# Patient Record
Sex: MALE | Race: BLACK OR AFRICAN AMERICAN | Employment: OTHER | RURAL
[De-identification: names, ages, dates, MRNs, and addresses within clinical notes are randomized per-mention and may not be internally consistent; named-entity substitution may affect disease eponyms.]

---

## 2017-02-19 DIAGNOSIS — E11.9 CONTROLLED TYPE 2 DIABETES MELLITUS WITHOUT COMPLICATION, WITHOUT LONG-TERM CURRENT USE OF INSULIN (HCC): ICD-10-CM

## 2017-02-20 RX ORDER — SITAGLIPTIN AND METFORMIN HYDROCHLORIDE 500; 50 MG/1; MG/1
TABLET, FILM COATED ORAL
Qty: 60 TAB | Refills: 2 | Status: SHIPPED | OUTPATIENT
Start: 2017-02-20 | End: 2017-04-20 | Stop reason: SDUPTHER

## 2017-03-16 DIAGNOSIS — E11.9 CONTROLLED TYPE 2 DIABETES MELLITUS WITHOUT COMPLICATION, WITHOUT LONG-TERM CURRENT USE OF INSULIN (HCC): ICD-10-CM

## 2017-03-16 RX ORDER — GLIMEPIRIDE 4 MG/1
TABLET ORAL
Qty: 90 TAB | Refills: 1 | Status: SHIPPED | OUTPATIENT
Start: 2017-03-16 | End: 2017-03-24 | Stop reason: SDUPTHER

## 2017-03-24 DIAGNOSIS — E11.9 CONTROLLED TYPE 2 DIABETES MELLITUS WITHOUT COMPLICATION, WITHOUT LONG-TERM CURRENT USE OF INSULIN (HCC): ICD-10-CM

## 2017-03-27 RX ORDER — GLIMEPIRIDE 4 MG/1
TABLET ORAL
Qty: 30 TAB | Refills: 1 | Status: SHIPPED | OUTPATIENT
Start: 2017-03-27 | End: 2017-04-20 | Stop reason: SDUPTHER

## 2017-04-20 ENCOUNTER — OFFICE VISIT (OUTPATIENT)
Dept: FAMILY MEDICINE CLINIC | Age: 67
End: 2017-04-20

## 2017-04-20 VITALS
RESPIRATION RATE: 18 BRPM | WEIGHT: 223 LBS | DIASTOLIC BLOOD PRESSURE: 72 MMHG | SYSTOLIC BLOOD PRESSURE: 124 MMHG | BODY MASS INDEX: 29.55 KG/M2 | HEIGHT: 73 IN | HEART RATE: 93 BPM | TEMPERATURE: 98 F | OXYGEN SATURATION: 97 %

## 2017-04-20 DIAGNOSIS — I10 ESSENTIAL HYPERTENSION: ICD-10-CM

## 2017-04-20 DIAGNOSIS — Z12.11 SCREENING FOR COLON CANCER: ICD-10-CM

## 2017-04-20 DIAGNOSIS — E78.2 MIXED HYPERLIPIDEMIA: ICD-10-CM

## 2017-04-20 DIAGNOSIS — E11.9 TYPE 2 DIABETES MELLITUS WITHOUT COMPLICATION, WITHOUT LONG-TERM CURRENT USE OF INSULIN (HCC): Primary | ICD-10-CM

## 2017-04-20 DIAGNOSIS — E55.9 VITAMIN D DEFICIENCY: ICD-10-CM

## 2017-04-20 DIAGNOSIS — I10 ESSENTIAL HYPERTENSION WITH GOAL BLOOD PRESSURE LESS THAN 140/90: ICD-10-CM

## 2017-04-20 DIAGNOSIS — E11.9 CONTROLLED TYPE 2 DIABETES MELLITUS WITHOUT COMPLICATION, WITHOUT LONG-TERM CURRENT USE OF INSULIN (HCC): ICD-10-CM

## 2017-04-20 DIAGNOSIS — E29.1 HYPOGONADISM IN MALE: ICD-10-CM

## 2017-04-20 LAB — HBA1C MFR BLD HPLC: 6 %

## 2017-04-20 RX ORDER — EZETIMIBE AND SIMVASTATIN 10; 40 MG/1; MG/1
1 TABLET ORAL
Qty: 30 TAB | Refills: 3 | Status: SHIPPED | OUTPATIENT
Start: 2017-04-20 | End: 2017-12-06 | Stop reason: SDUPTHER

## 2017-04-20 RX ORDER — GLIMEPIRIDE 4 MG/1
4 TABLET ORAL
Qty: 30 TAB | Refills: 5 | Status: SHIPPED | OUTPATIENT
Start: 2017-04-20 | End: 2017-12-06 | Stop reason: SDUPTHER

## 2017-04-20 RX ORDER — TADALAFIL 5 MG/1
5 TABLET ORAL DAILY
Qty: 8 TAB | Refills: 5 | Status: SHIPPED | OUTPATIENT
Start: 2017-04-20 | End: 2017-05-20

## 2017-04-20 RX ORDER — TADALAFIL 5 MG/1
5 TABLET ORAL
COMMUNITY
End: 2017-04-20 | Stop reason: SDUPTHER

## 2017-04-20 RX ORDER — LISINOPRIL 20 MG/1
20 TABLET ORAL DAILY
Qty: 30 TAB | Refills: 5 | Status: SHIPPED | OUTPATIENT
Start: 2017-04-20 | End: 2017-12-06 | Stop reason: SDUPTHER

## 2017-04-20 NOTE — PATIENT INSTRUCTIONS
Diabetes Foot Health: Care Instructions  Your Care Instructions    When you have diabetes, your feet need extra care and attention. Diabetes can damage the nerve endings and blood vessels in your feet, making you less likely to notice when your feet are injured. Diabetes also limits your body's ability to fight infection and get blood to areas that need it. If you get a minor foot injury, it could become an ulcer or a serious infection. With good foot care, you can prevent most of these problems. Caring for your feet can be quick and easy. Most of the care can be done when you are bathing or getting ready for bed. Follow-up care is a key part of your treatment and safety. Be sure to make and go to all appointments, and call your doctor if you are having problems. Its also a good idea to know your test results and keep a list of the medicines you take. How can you care for yourself at home? · Keep your blood sugar close to normal by watching what and how much you eat, monitoring blood sugar, taking medicines if prescribed, and getting regular exercise. · Do not smoke. Smoking affects blood flow and can make foot problems worse. If you need help quitting, talk to your doctor about stop-smoking programs and medicines. These can increase your chances of quitting for good. · Eat a diet that is low in fats. High fat intake can cause fat to build up in your blood vessels and decrease blood flow. · Inspect your feet daily for blisters, cuts, cracks, or sores. If you cannot see well, use a mirror or have someone help you. · Take care of your feet:  [facility] your feet every day. Use warm (not hot) water. Check the water temperature with your wrists or other part of your body, not your feet. ¨ Dry your feet well. Pat them dry. Do not rub the skin on your feet too hard. Dry well between your toes. If the skin on your feet stays moist, bacteria or a fungus can grow, which can lead to infection.   ¨ Keep your skin soft. Use moisturizing skin cream to keep the skin on your feet soft and prevent calluses and cracks. But do not put the cream between your toes, and stop using any cream that causes a rash. ¨ Clean underneath your toenails carefully. Do not use a sharp object to clean underneath your toenails. Use the blunt end of a nail file or other rounded tool. ¨ Trim and file your toenails straight across to prevent ingrown toenails. Use a nail clipper, not scissors. Use an emery board to smooth the edges. · Change socks daily. Socks without seams are best, because seams often rub the feet. You can find socks for people with diabetes from specialty catalogs. · Look inside your shoes every day for things like gravel or torn linings, which could cause blisters or sores. · Buy shoes that fit well:  ¨ Look for shoes that have plenty of space around the toes. This helps prevent bunions and blisters. ¨ Try on shoes while wearing the kind of socks you will usually wear with the shoes. ¨ Avoid plastic shoes. They may rub your feet and cause blisters. Good shoes should be made of materials that are flexible and breathable, such as leather or cloth. ¨ Break in new shoes slowly by wearing them for no more than an hour a day for several days. Take extra time to check your feet for red areas, blisters, or other problems after you wear new shoes. · Do not go barefoot. Do not wear sandals, and do not wear shoes with very thin soles. Thin soles are easy to puncture. They also do not protect your feet from hot pavement or cold weather. · Have your doctor check your feet during each visit. If you have a foot problem, see your doctor. Do not try to treat an early foot problem at home. Home remedies or treatments that you can buy without a prescription (such as corn removers) can be harmful. · Always get early treatment for foot problems. A minor irritation can lead to a major problem if not properly cared for early.   When should you call for help? Call your doctor now or seek immediate medical care if:  · You have a foot sore, an ulcer or break in the skin that is not healing after 4 days, bleeding corns or calluses, or an ingrown toenail. · You have blue or black areas, which can mean bruising or blood flow problems. · You have peeling skin or tiny blisters between your toes or cracking or oozing of the skin. · You have a fever for more than 24 hours and a foot sore. · You have new numbness or tingling in your feet that does not go away after you move your feet or change positions. · You have unexplained or unusual swelling of the foot or ankle. Watch closely for changes in your health, and be sure to contact your doctor if:  · You cannot do proper foot care. Where can you learn more? Go to http://darrick-dyllan.info/. Enter A739 in the search box to learn more about \"Diabetes Foot Health: Care Instructions. \"  Current as of: May 23, 2016  Content Version: 11.2  © 6501-8189 [employer]. Care instructions adapted under license by [employer] (which disclaims liability or warranty for this information). If you have questions about a medical condition or this instruction, always ask your healthcare professional. [address] any warranty or liability for your use of this information. Learning About Colonoscopy  What is a colonoscopy? A colonoscopy is a test (also called a procedure) that lets a doctor look inside your large intestine. The doctor uses a thin, lighted tube called a colonoscope. The doctor uses it to look for small growths called polyps, colon or rectal cancer (colorectal cancer), or other problems like bleeding. During the procedure, the doctor can take samples of tissue. The samples can then be checked for cancer or other conditions. The doctor can also take out polyps. How is colonoscopy done?   This procedure is done in a doctor's office or a clinic or hospital. Barbara Ruby will get medicine to help you relax and not feel pain. Some people find that they do not remember having the test because of the medicine. The doctor gently moves the colonoscope, or scope, through the colon. The scope is also a small video camera. It lets the doctor see the colon and take pictures. A colonoscopy usually takes 30 to 45 minutes. It may take longer if the doctor has to remove polyps. How do you prepare for the procedure? You need to clean out your colon before the procedure so the doctor can see all of your colon. You may start the cleaning process a day or two before the test. This depends on which \"colon prep\" your doctor recommends. To clean your colon, you stop eating solid foods and drink only clear liquids. You can have water, tea, coffee, clear juices, clear broths, flavored ice pops, and gelatin (such as Jell-O). Do not drink anything red or purple, such as grape juice or fruit punch. And do not eat red or purple foods, such as grape ice pops or cherry gelatin. The day or night before the procedure, you drink a large amount of a special liquid. This causes loose, frequent stools. You will go to the bathroom a lot. It is very important to drink all of the colon prep liquid. If you have problems drinking the liquid, call your doctor. For many people, the prep is worse than the test. It may be uncomfortable, and you may feel hungry on the clear liquid diet. Some people do not go to work or do their usual activities on the day of the prep. Arrange to have someone take you home after the test.  What can you expect after a colonoscopy? The nurses will watch you for 1 to 2 hours until the medicines wear off. Then you can go home. You will need a ride. Your doctor will tell you when you can eat and do your usual activities. Your doctor will talk to you about when you will need your next colonoscopy.  The results of your test and your risk for colorectal cancer will help your doctor decide how often you need to be checked. Follow-up care is a key part of your treatment and safety. Be sure to make and go to all appointments, and call your doctor if you are having problems. It's also a good idea to know your test results and keep a list of the medicines you take. Where can you learn more? Go to http://darrick-dyllan.info/. Enter F796 in the search box to learn more about \"Learning About Colonoscopy. \"  Current as of: July 26, 2016  Content Version: 11.2  © 8412-2209 [employer]. Care instructions adapted under license by [employer] (which disclaims liability or warranty for this information). If you have questions about a medical condition or this instruction, always ask your healthcare professional. [address] any warranty or liability for your use of this information.

## 2017-04-20 NOTE — PROGRESS NOTES
CC:  Chief Complaint   Patient presents with   [facility] Medication Refill       HISTORY OF PRESENT ILLNESS  Basil Calixto is a 61 y.o. male. HPI Comments: Who presents today for follow up of type II DM, HTN, HLD and also medication refills with labs. Mr. Jenna Espinosa continues to do very well. He is adherent to his medical plan and eating a low fat, low carbohydrate diet. Since his last visit, he has not been hospitalized or seen in the ED. LUIS E is being brought up-to-date today. He is followed by urology annually and has PSA and FLY done with them. He does have a h/o prostate cancer. Labs     Medication Refill         PMH:  Past Medical History:   Diagnosis Date    Colon polyps     Contact dermatitis and other eczema, due to unspecified cause     DM type 2 (diabetes mellitus, type 2) ([facility] [address] 6/10/2009    Essential hypertension, benign     Hypertension     [facility] 9[facility]25[facility]2012    Mixed hyperlipidemia 6/10/2009    Mixed hyperlipidemia 6/10/2009    Prostate cancer ([facility] 11/2009    resection prostate       MEDICATIONS:  Current Outpatient Prescriptions   Medication Sig Dispense Refill    diph,pertuss,acel,,tetanus vac,PF, (ADACEL) 2 Lf-(2.5-5-3-5 mcg)-5Lf/0.5 mL syrg vaccine 0.5 mL by IntraMUSCular route once for 1 dose. 0.5 mL 0    tadalafil (CIALIS) 5 mg tablet Take 1 Tab by mouth daily for 30 days. 8 Tab 5    glimepiride (AMARYL) 4 mg tablet Take 1 Tab by mouth every morning for 30 days. 30 Tab 5    SITagliptin-metFORMIN (JANUMET)  mg per tablet Take 1 Tab by mouth two (2) times daily (with meals). 60 Tab 5    lisinopril (PRINIVIL, ZESTRIL) 20 mg tablet Take 1 Tab by mouth daily. 30 Tab 5    ezetimibe-simvastatin (VYTORIN 10/40) 10-40 mg per tablet Take 1 Tab by mouth nightly. 30 Tab 3    glucose blood VI test strips (ONETOUCH ULTRA TEST) strip Check BS daily 100 Strip 3    aspirin delayed-release 81 mg tablet Take 81 mg by mouth daily.       omega-3 fatty acids cap Take 1,000 mg by mouth daily.  hydrocortisone (HYTONE) 2.5 % topical cream Apply  to affected area two (2) times a day. use thin layer 1 Tube 5         ROS:  Review of Systems   All other systems reviewed and are negative. OBJECTIVE:  Visit Vitals    /72 (BP 1 Location: Right arm, BP Patient Position: Sitting)  Comment: auto cuff    Pulse 93    Temp 98 °F (36.7 °C) (Temporal)    Resp 18    Ht 6' 1\" (1.854 m)    Wt 223 lb (101.2 kg)    SpO2 97%    BMI 29.42 kg/m2   Physical Exam   Constitutional: He appears well-developed. HENT:   Head: Normocephalic and atraumatic. Eyes: Conjunctivae and EOM are normal. Pupils are equal, round, and reactive to light. Neck: Normal range of motion. Cardiovascular: Normal rate and regular rhythm. Pulmonary/Chest: Effort normal and breath sounds normal.   Neurological: He is alert. Skin: Skin is warm. Psychiatric: He has a normal mood and affect. His behavior is normal.   Nursing note and vitals reviewed. LABS:  Results for orders placed or performed in visit on 04/20/17   AMB POC HEMOGLOBIN A1C   Result Value Ref Range    Hemoglobin A1c (POC) 6.0 %       ASSESSMENT and PLAN    ICD-10-CM ICD-9-CM    1. Type 2 diabetes mellitus without complication, without long-term current use of insulin (HCC) E11.9 250.00 AMB POC HEMOGLOBIN B9Y      METABOLIC PANEL, COMPREHENSIVE      CBC WITH AUTOMATED DIFF      HEMOGLOBIN A1C WITH EAG      glucose blood VI test strips (ONETOUCH ULTRA TEST) strip      REFERRAL TO OPHTHALMOLOGY      MICROALBUMIN, UR, RAND W/ MICROALBUMIN/CREA RATIO   2. Mixed hyperlipidemia E78.2 272.2 LIPID PANEL      CRP, HIGH SENSITIVITY      METABOLIC PANEL, COMPREHENSIVE      ezetimibe-simvastatin (VYTORIN 10/40) 10-40 mg per tablet   3. Essential hypertension I10 401.9 VITAMIN D, 25 HYDROXY   4. Vitamin D deficiency E55.9 268.9    5.  Controlled type 2 diabetes mellitus without complication, without long-term current use of insulin (HCC) E11.9 250.00 glimepiride (AMARYL) 4 mg tablet      SITagliptin-metFORMIN (JANUMET)  mg per tablet   6. Essential hypertension with goal blood pressure less than 140/90 I10 401.9 lisinopril (PRINIVIL, ZESTRIL) 20 mg tablet   7. Hypogonadism in male E29.1 257.2 tadalafil (CIALIS) 5 mg tablet   8. Screening for colon cancer Z12.11 V76.51 REFERRAL TO GASTROENTEROLOGY     66M who presents for follow up of Type II DM, HTN, HLD. VS are stable and he is doing well. We reviewed his HM and brought up to date. Appropriate referrals given and medications refilled. Will advise when the labs return. Pt verbalizes understanding of plan of care and denies further questions or concerns at this time. Follow-up Disposition:  Return if symptoms worsen or fail to improve. Return in 6-months and prn.

## 2017-04-20 NOTE — LETTER
5/2/2017 1:29 PM 
 
Mr. Claude Benito [id number] [id number] Dear Claude Benito: 
 
Please find your most recent results below. Resulted Orders AMB POC HEMOGLOBIN A1C Result Value Ref Range Hemoglobin A1c (POC) 6.0 % LIPID PANEL Result Value Ref Range Cholesterol, total 189 100 - 199 mg/dL Triglyceride 65 0 - 149 mg/dL HDL Cholesterol 61 >39 mg/dL VLDL, calculated 13 5 - 40 mg/dL LDL, calculated 115 (H) 0 - 99 mg/dL Narrative Performed at:  [address]  [id number] : Sherwin Meckel MD, Phone:  [phone] CRP, HIGH SENSITIVITY Result Value Ref Range C-Reactive Protein, Cardiac 0.72 0.00 - 3.00 mg/L Comment:  
            Relative Risk for Future Cardiovascular Event Low                 <1.00 Average       1.00 - 3.00 High                >3.00 Narrative Performed at:  [address]  [id number] : Sherwin Meckel MD, Phone:  [phone] VITAMIN D, 25 HYDROXY Result Value Ref Range VITAMIN D, 25-HYDROXY 40.6 30.0 - 100.0 ng/mL Comment:  
   Vitamin D deficiency has been defined by the [address] [place] [address] practice guideline as a 
level of serum 25-OH vitamin D less than 20 ng/mL (1,2). The Endocrine Society went on to further define vitamin D 
insufficiency as a level between 21 and 29 ng/mL (2). 1. IOM ([place] of Medicine). 2010. Dietary reference 
   intakes for calcium and D. [address] [place] [address]: The 
   [employer]. 2. Valarie MF, Nirali NC, Richardson SWENSON, et al. 
   Evaluation, treatment, and prevention of vitamin D 
   deficiency: an Endocrine Society clinical practice 
   guideline. JCEM. 2011 Jul; 96(7):1911-30. Narrative Performed at:  [place] [address] [address]  [id number] : Millie Hughes MD, Phone:  [phone] METABOLIC PANEL, COMPREHENSIVE Result Value Ref Range Glucose 109 (H) 65 - 99 mg/dL BUN 11 8 - 27 mg/dL Creatinine 0.90 0.76 - 1.27 mg/dL GFR est non-AA 90 >59 mL/min/1.73 GFR est  >59 mL/min/1.73  
 BUN/Creatinine ratio 12 10 - 24 Sodium 141 134 - 144 mmol/L Potassium 4.8 3.5 - 5.2 mmol/L Chloride 102 96 - 106 mmol/L  
 CO2 25 18 - 29 mmol/L Calcium 9.4 8.6 - 10.2 mg/dL Protein, total 7.1 6.0 - 8.5 g/dL Albumin 4.5 3.6 - 4.8 g/dL GLOBULIN, TOTAL 2.6 1.5 - 4.5 g/dL A-G Ratio 1.7 1.2 - 2.2 Bilirubin, total 0.4 0.0 - 1.2 mg/dL Alk. phosphatase 43 39 - 117 IU/L  
 AST (SGOT) 13 0 - 40 IU/L  
 ALT (SGPT) 13 0 - 44 IU/L Narrative Performed at:  [address]  [id number] : Millie Hughes MD, Phone:  [phone] CBC WITH AUTOMATED DIFF Result Value Ref Range WBC 6.4 3.4 - 10.8 x10E3/uL  
 RBC 3.95 (L) 4.14 - 5.80 x10E6/uL HGB 12.5 (L) 12.6 - 17.7 g/dL HCT 37.8 37.5 - 51.0 % MCV 96 79 - 97 fL  
 MCH 31.6 26.6 - 33.0 pg  
 MCHC 33.1 31.5 - 35.7 g/dL  
 RDW 13.8 12.3 - 15.4 % PLATELET 631 459 - 705 x10E3/uL NEUTROPHILS 60 % Lymphocytes 32 % MONOCYTES 7 % EOSINOPHILS 1 % BASOPHILS 0 %  
 ABS. NEUTROPHILS 3.8 1.4 - 7.0 x10E3/uL Abs Lymphocytes 2.0 0.7 - 3.1 x10E3/uL  
 ABS. MONOCYTES 0.5 0.1 - 0.9 x10E3/uL  
 ABS. EOSINOPHILS 0.1 0.0 - 0.4 x10E3/uL  
 ABS. BASOPHILS 0.0 0.0 - 0.2 x10E3/uL IMMATURE GRANULOCYTES 0 %  
 ABS. IMM. GRANS. 0.0 0.0 - 0.1 x10E3/uL Narrative Performed at:  [address]  [id number] : Millie Hughes MD, Phone:  [phone] HEMOGLOBIN A1C WITH EAG Result Value Ref Range Hemoglobin A1c 6.3 (H) 4.8 - 5.6 % Comment:  
            Pre-diabetes: 5.7 - 6.4 Diabetes: >6.4 Glycemic control for adults with diabetes: <7.0 Estimated average glucose 134 mg/dL Narrative Performed at:  [address]  [id number] : Idris Cleaning MD, Phone:  [phone] MICROALBUMIN, UR, RAND W/ MICROALBUMIN/CREA RATIO Result Value Ref Range Creatinine, urine 83.2 Not Estab. mg/dL Microalbumin, urine 7.4 Not Estab. ug/mL Microalb/Creat ratio (ug/mg creat.) 8.9 0.0 - 30.0 mg/g creat Narrative Performed at:  [address]  [id number] : Idris Cleaning MD, Phone:  [phone] CVD REPORT Result Value Ref Range INTERPRETATION Note Comment:  
   Supplement report is available. Narrative Performed at:  [facility] [address] [place] [address]  [id number] : Yolande Martinez PhD, Phone:  [phone] DIABETES PATIENT EDUCATION Result Value Ref Range PDF Image Not applicable Narrative Performed at:  [facility] [address] [place] [address]  [id number] : Yolande Martinez PhD, Phone:  [phone] RECOMMENDATIONS: 
Labs stable at this time. Continue to take medications as prescribed. Avoid high cholesterol high fat meals. Continue to eat a diet that does not include high carbohydrates. HBA1C went from 6.0 to 6.3, which is still good. But just want to make sure that keep this number <7.0 if possible. Keep up the good work! Please call me if you have any questions: [phone] Sincerely, Mimi Cyr MD

## 2017-04-20 NOTE — PROGRESS NOTES
Identified pt with two pt identifiers(name and ). Chief Complaint   Patient presents with    Labs    Medication Refill        Health Maintenance Due   Topic    DTaP/Tdap/Td series (1 - Tdap)    EYE EXAM RETINAL OR DILATED Q1     COLONOSCOPY     HEMOGLOBIN A1C Q6M        Wt Readings from Last 3 Encounters:   17 223 lb (101.2 kg)   10/04/16 224 lb (101.6 kg)   16 229 lb (103.9 kg)     Temp Readings from Last 3 Encounters:   17 98 °F (36.7 °C) (Temporal)   10/04/16 97.6 °F (36.4 °C) (Oral)   16 98.3 °F (36.8 °C) (Oral)     BP Readings from Last 3 Encounters:   17 124/72   10/04/16 120/72   16 157/83     Pulse Readings from Last 3 Encounters:   17 93   10/04/16 60   16 60         Learning Assessment:  :     Learning Assessment 2016   PRIMARY LEARNER Patient Patient   HIGHEST LEVEL OF EDUCATION - PRIMARY LEARNER  GRADUATED HIGH SCHOOL OR GED -   BARRIERS PRIMARY LEARNER NONE -   CO-LEARNER CAREGIVER No -   PRIMARY LANGUAGE ENGLISH ENGLISH   LEARNER PREFERENCE PRIMARY OTHER (COMMENT) DEMONSTRATION   ANSWERED BY self patient   RELATIONSHIP SELF SELF       Depression Screening:  :     PHQ 2 / 9, over the last two weeks 2017   Little interest or pleasure in doing things Not at all   Feeling down, depressed or hopeless Not at all   Total Score PHQ 2 0       Fall Risk Assessment:  :     Fall Risk Assessment, last 12 mths 2017   Able to walk? Yes   Fall in past 12 months? No       Abuse Screening:  :     Abuse Screening Questionnaire 2017   Do you ever feel afraid of your partner? N N N   Are you in a relationship with someone who physically or mentally threatens you? N N N   Is it safe for you to go home?  Y Y Y       Coordination of Care Questionnaire:  :     1) Have you been to an emergency room, urgent care clinic since your last visit? no   Hospitalized since your last visit? no             2) Have you seen or consulted any other health care providers outside of [address] [place] [address] since your last visit? no  (Include any pap smears or colon screenings in this section.)    3) Do you have an Advance Directive on file? no  Are you interested in receiving information about Advance Directives? no    Reviewed record in preparation for visit and have obtained necessary documentation. Medication reconciliation up to date and corrected with patient at this time.

## 2017-04-20 NOTE — MR AVS SNAPSHOT
Visit Information Date & Time Provider Department Dept. Phone Encounter #  
 4/20/2017  8:30 AM Rachid Salazar Mihai [id number] Upcoming Health Maintenance Date Due DTaP/Tdap/Td series (1 - Tdap) 8/31/1973 EYE EXAM RETINAL OR DILATED Q1 6/12/2014 COLONOSCOPY 9/12/2014 Pneumococcal 19-64 Highest Risk (2 of 3 - PCV13) 5/24/2017 FOOT EXAM Q1 5/24/2017 MICROALBUMIN Q1 5/24/2017 LIPID PANEL Q1 10/4/2017 HEMOGLOBIN A1C Q6M 10/20/2017 Allergies as of 4/20/2017  Review Complete On: 4/20/2017 By: Mame Joseph LPN No Known Allergies Current Immunizations  Reviewed on 4/20/2017 No immunizations on file. Reviewed by Aminata Villagomez MD on 4/20/2017 at  9:03 AM  
You Were Diagnosed With   
  
 Codes Comments Type 2 diabetes mellitus without complication, without long-term current use of insulin ([facility])    -  Primary ICD-10-CM: E11.9 ICD-9-CM: 250.00 Mixed hyperlipidemia     ICD-10-CM: E78.2 ICD-9-CM: 272.2 Essential hypertension     ICD-10-CM: I10 
ICD-9-CM: 401.9 Vitamin D deficiency     ICD-10-CM: E55.9 ICD-9-CM: 268.9 Controlled type 2 diabetes mellitus without complication, without long-term current use of insulin ([facility] [address]     ICD-10-CM: E11.9 ICD-9-CM: 250.00 Essential hypertension with goal blood pressure less than 140/90     ICD-10-CM: I10 
ICD-9-CM: 401.9 Hypogonadism in male     ICD-10-CM: E29.1 ICD-9-CM: 257.2 Screening for colon cancer     ICD-10-CM: Z12.11 ICD-9-CM: V76.51 Vitals BP Pulse Temp Resp Height(growth percentile) Weight(growth percentile) 124/72 (BP 1 Location: Right arm, BP Patient Position: Sitting) 93 98 °F (36.7 °C) (Temporal) 18 6' 1\" (1.854 m) 223 lb (101.2 kg) SpO2 BMI Smoking Status 97% 29.42 kg/m2 Never Smoker Vitals History BMI and BSA Data  Body Mass Index Body Surface Area  
 29.42 kg/m 2 2.28 m 2  
  
  
 Preferred Pharmacy Pharmacy Name Phone [facility] [address] [address], [address] [phone] Your Updated Medication List  
  
   
This list is accurate as of: 17  9:14 AM.  Always use your most recent med list.  
  
  
  
  
 aspirin delayed-release 81 mg tablet Take 81 mg by mouth daily. diph,pertuss(acel),tetanus vac(PF) 2 Lf-(2.5-5-3-5 mcg)-5Lf/0.5 mL Syrg vaccine Commonly known as:  ADACEL  
0.5 mL by IntraMUSCular route once for 1 dose. ezetimibe-simvastatin 10-40 mg per tablet Commonly known as:  Vytorin 10/40 Take 1 Tab by mouth nightly. glimepiride 4 mg tablet Commonly known as:  AMARYL Take 1 Tab by mouth every morning for 30 days. glucose blood VI test strips strip Commonly known as:  ONETOUCH ULTRA TEST Check BS daily  
  
 hydrocortisone 2.5 % topical cream  
Commonly known as:  HYTONE Apply  to affected area two (2) times a day. use thin layer  
  
 lisinopril 20 mg tablet Commonly known as:  Rexanne  Take 1 Tab by mouth daily. omega-3 fatty acids Cap Take 1,000 mg by mouth daily. SITagliptin-metFORMIN  mg per tablet Commonly known as:  [place] Spray Take 1 Tab by mouth two (2) times daily (with meals). tadalafil 5 mg tablet Commonly known as:  CIALIS Take 1 Tab by mouth daily for 30 days. Prescriptions Printed Refills diph,pertuss,acel,,tetanus vac,PF, (ADACEL) 2 Lf-(2.5-5-3-5 mcg)-5Lf/0.5 mL syrg vaccine 0 Si.5 mL by IntraMUSCular route once for 1 dose. Class: Print Route: IntraMUSCular Prescriptions Sent to Pharmacy Refills  
 tadalafil (CIALIS) 5 mg tablet 5 Sig: Take 1 Tab by mouth daily for 30 days. Class: Normal  
 Pharmacy: [facility] [address] [address], [address] #: [phone]  Route: Oral  
 glimepiride (AMARYL) 4 mg tablet 5  
 Sig: Take 1 Tab by mouth every morning for 30 days. Class: Normal  
 Pharmacy: [place] [address] Ph #: [phone] Route: Oral  
 SITagliptin-metFORMIN (JANUMET)  mg per tablet 5 Sig: Take 1 Tab by mouth two (2) times daily (with meals). Class: Normal  
 Pharmacy: [place] [address] Ph #: [phone] Route: Oral  
 lisinopril (PRINIVIL, ZESTRIL) 20 mg tablet 5 Sig: Take 1 Tab by mouth daily. Class: Normal  
 Pharmacy: [place] [address] Ph #: [phone] Route: Oral  
 ezetimibe-simvastatin (VYTORIN 10/40) 10-40 mg per tablet 3 Sig: Take 1 Tab by mouth nightly. Class: Normal  
 Pharmacy: [place] [address] Ph #: [phone] Route: Oral  
 glucose blood VI test strips (ONETOUCH ULTRA TEST) strip 3 Sig: Check BS daily Class: Normal  
 Pharmacy: [place] [address] Ph #: [phone] We Performed the Following AMB POC HEMOGLOBIN A1C [03923 CPT(R)] CBC WITH AUTOMATED DIFF [88787 CPT(R)] CRP, HIGH SENSITIVITY [09755 CPT(R)] HEMOGLOBIN A1C WITH EAG [52685 CPT(R)] LIPID PANEL [93272 CPT(R)] METABOLIC PANEL, COMPREHENSIVE [64471 CPT(R)] MICROALBUMIN, UR, RAND W/ MICROALBUMIN/CREA RATIO [id number] CPT(R)] REFERRAL TO GASTROENTEROLOGY [ATL27 Custom] Comments:  
 Please evaluate patient for screening for colon cancer. REFERRAL TO OPHTHALMOLOGY [REF57 Custom] Comments:  
 Please evaluate patient for annual diabetic eye exam.  
 VITAMIN D, 25 HYDROXY [74397 CPT(R)] Referral Information Referral ID Referred By Referred To  
  
 [id number] Zo JUNIOR Not Available Visits Status Start Date End Date 1 New Request 4/20/17 4/20/18 If your referral has a status of pending review or denied, additional information will be sent to support the outcome of this decision. Referral ID Referred By Referred To  
 [id number] Luisa Rogers [employer] [address] [place], [address] Visits Status Start Date End Date 1 New Request 4/20/17 4/20/18 If your referral has a status of pending review or denied, additional information will be sent to support the outcome of this decision. Patient Instructions Diabetes Foot Health: Care Instructions Your Care Instructions When you have diabetes, your feet need extra care and attention. Diabetes can damage the nerve endings and blood vessels in your feet, making you less likely to notice when your feet are injured. Diabetes also limits your body's ability to fight infection and get blood to areas that need it. If you get a minor foot injury, it could become an ulcer or a serious infection. With good foot care, you can prevent most of these problems. Caring for your feet can be quick and easy. Most of the care can be done when you are bathing or getting ready for bed. Follow-up care is a key part of your treatment and safety. Be sure to make and go to all appointments, and call your doctor if you are having problems. Its also a good idea to know your test results and keep a list of the medicines you take. How can you care for yourself at home? · Keep your blood sugar close to normal by watching what and how much you eat, monitoring blood sugar, taking medicines if prescribed, and getting regular exercise. · Do not smoke. Smoking affects blood flow and can make foot problems worse. If you need help quitting, talk to your doctor about stop-smoking programs and medicines. These can increase your chances of quitting for good. · Eat a diet that is low in fats. High fat intake can cause fat to build up in your blood vessels and decrease blood flow. · Inspect your feet daily for blisters, cuts, cracks, or sores. If you cannot see well, use a mirror or have someone help you. · Take care of your feet: 
[facility] AUTHORITY your feet every day. Use warm (not hot) water. Check the water temperature with your wrists or other part of your body, not your feet. ¨ Dry your feet well. Pat them dry. Do not rub the skin on your feet too hard. Dry well between your toes. If the skin on your feet stays moist, bacteria or a fungus can grow, which can lead to infection. ¨ Keep your skin soft. Use moisturizing skin cream to keep the skin on your feet soft and prevent calluses and cracks. But do not put the cream between your toes, and stop using any cream that causes a rash. ¨ Clean underneath your toenails carefully. Do not use a sharp object to clean underneath your toenails. Use the blunt end of a nail file or other rounded tool. ¨ Trim and file your toenails straight across to prevent ingrown toenails. Use a nail clipper, not scissors. Use an emery board to smooth the edges. · Change socks daily. Socks without seams are best, because seams often rub the feet. You can find socks for people with diabetes from specialty catalogs. · Look inside your shoes every day for things like gravel or torn linings, which could cause blisters or sores. · Buy shoes that fit well: 
¨ Look for shoes that have plenty of space around the toes. This helps prevent bunions and blisters. ¨ Try on shoes while wearing the kind of socks you will usually wear with the shoes. ¨ Avoid plastic shoes. They may rub your feet and cause blisters. Good shoes should be made of materials that are flexible and breathable, such as leather or cloth. ¨ Break in new shoes slowly by wearing them for no more than an hour a day for several days.  Take extra time to check your feet for red areas, blisters, or other problems after you wear new shoes. · Do not go barefoot. Do not wear sandals, and do not wear shoes with very thin soles. Thin soles are easy to puncture. They also do not protect your feet from hot pavement or cold weather. · Have your doctor check your feet during each visit. If you have a foot problem, see your doctor. Do not try to treat an early foot problem at home. Home remedies or treatments that you can buy without a prescription (such as corn removers) can be harmful. · Always get early treatment for foot problems. A minor irritation can lead to a major problem if not properly cared for early. When should you call for help? Call your doctor now or seek immediate medical care if: 
· You have a foot sore, an ulcer or break in the skin that is not healing after 4 days, bleeding corns or calluses, or an ingrown toenail. · You have blue or black areas, which can mean bruising or blood flow problems. · You have peeling skin or tiny blisters between your toes or cracking or oozing of the skin. · You have a fever for more than 24 hours and a foot sore. · You have new numbness or tingling in your feet that does not go away after you move your feet or change positions. · You have unexplained or unusual swelling of the foot or ankle. Watch closely for changes in your health, and be sure to contact your doctor if: 
· You cannot do proper foot care. Where can you learn more? Go to http://darrick-dyllan.info/. Enter A739 in the search box to learn more about \"Diabetes Foot Health: Care Instructions. \" Current as of: May 23, 2016 Content Version: 11.2 © 1094-3810 [employer]. Care instructions adapted under license by [employer] (which disclaims liability or warranty for this information).  If you have questions about a medical condition or this instruction, always ask your healthcare professional. Beatriz Keating, [facility] disclaims any warranty or liability for your use of this information. Learning About Colonoscopy What is a colonoscopy? A colonoscopy is a test (also called a procedure) that lets a doctor look inside your large intestine. The doctor uses a thin, lighted tube called a colonoscope. The doctor uses it to look for small growths called polyps, colon or rectal cancer (colorectal cancer), or other problems like bleeding. During the procedure, the doctor can take samples of tissue. The samples can then be checked for cancer or other conditions. The doctor can also take out polyps. How is colonoscopy done? This procedure is done in a doctor's office or a clinic or hospital. You will get medicine to help you relax and not feel pain. Some people find that they do not remember having the test because of the medicine. The doctor gently moves the colonoscope, or scope, through the colon. The scope is also a small video camera. It lets the doctor see the colon and take pictures. A colonoscopy usually takes 30 to 45 minutes. It may take longer if the doctor has to remove polyps. How do you prepare for the procedure? You need to clean out your colon before the procedure so the doctor can see all of your colon. You may start the cleaning process a day or two before the test. This depends on which \"colon prep\" your doctor recommends. To clean your colon, you stop eating solid foods and drink only clear liquids. You can have water, tea, coffee, clear juices, clear broths, flavored ice pops, and gelatin (such as Jell-O). Do not drink anything red or purple, such as grape juice or fruit punch. And do not eat red or purple foods, such as grape ice pops or cherry gelatin. The day or night before the procedure, you drink a large amount of a special liquid. This causes loose, frequent stools. You will go to the bathroom a lot.  It is very important to drink all of the colon prep liquid. If you have problems drinking the liquid, call your doctor. For many people, the prep is worse than the test. It may be uncomfortable, and you may feel hungry on the clear liquid diet. Some people do not go to work or do their usual activities on the day of the prep. Arrange to have someone take you home after the test. 
What can you expect after a colonoscopy? The nurses will watch you for 1 to 2 hours until the medicines wear off. Then you can go home. You will need a ride. Your doctor will tell you when you can eat and do your usual activities. Your doctor will talk to you about when you will need your next colonoscopy. The results of your test and your risk for colorectal cancer will help your doctor decide how often you need to be checked. Follow-up care is a key part of your treatment and safety. Be sure to make and go to all appointments, and call your doctor if you are having problems. It's also a good idea to know your test results and keep a list of the medicines you take. Where can you learn more? Go to http://darrick-dyllan.info/. Enter C903 in the search box to learn more about \"Learning About Colonoscopy. \" Current as of: July 26, 2016 Content Version: 11.2 © 0261-7949 [employer]. Care instructions adapted under license by [employer] (which disclaims liability or warranty for this information). If you have questions about a medical condition or this instruction, always ask your healthcare professional. [place] [address] any warranty or liability for your use of this information. Introducing \[facility] [place] [facility]\"" & HEALTH SERVICES! [place] [employer] introduces [employer] patient portal. Now you can access parts of your medical record, email your doctor's office, and request medication refills online. 1. In your internet browser, go to [email or web address] DocVue/Transonic Combustiont 2. Click on the First Time User? Click Here link in the Sign In box.  You will see the New Member Sign Up page. 3. Enter your Fantasy Shopper Access Code exactly as it appears below. You will not need to use this code after youve completed the sign-up process. If you do not sign up before the expiration date, you must request a new code. · Fantasy [employer]pper Access Code: [email or web address] Expires: 7/19/2017  9:14 AM 
 
4. Enter the last four digits of your Social Security Number (xxxx) and Date of Birth (mm/dd/yyyy) as indicated and click Submit. You will be taken to the next sign-up page. 5. Create a Fantasy Shopper ID. This will be your Fantasy Shopper login ID and cannot be changed, so think of one that is secure and easy to remember. 6. Create a [employer] Shopper password. You can change your password at any time. 7. Enter your Password Reset Question and Answer. This can be used at a later time if you forget your password. 8. Enter your e-mail address. You will receive e-mail notification when new information is available in [address]. 9. Click Sign Up. You can now view and download portions of your medical record. 10. Click the Download Summary menu link to download a portable copy of your medical information. If you have questions, please visit the Frequently Asked Questions section of the Fantasy Shopper website. Remember, [employer]pper is NOT to be used for urgent needs. For medical emergencies, dial 911. Now available from your iPhone and Android! Please provide this summary of care documentation to your next provider. Your primary care clinician is listed as [address]. If you have any questions after today's visit, please call [phone].

## 2017-04-21 LAB
25(OH)D3+25(OH)D2 SERPL-MCNC: 40.6 NG/ML (ref 30–100)
ALBUMIN SERPL-MCNC: 4.5 G/DL (ref 3.6–4.8)
ALBUMIN/CREAT UR: 8.9 MG/G CREAT (ref 0–30)
ALBUMIN/GLOB SERPL: 1.7 {RATIO} (ref 1.2–2.2)
ALP SERPL-CCNC: 43 IU/L (ref 39–117)
ALT SERPL-CCNC: 13 IU/L (ref 0–44)
AST SERPL-CCNC: 13 IU/L (ref 0–40)
BASOPHILS # BLD AUTO: 0 X10E3/UL (ref 0–0.2)
BASOPHILS NFR BLD AUTO: 0 %
BILIRUB SERPL-MCNC: 0.4 MG/DL (ref 0–1.2)
BUN SERPL-MCNC: 11 MG/DL (ref 8–27)
BUN/CREAT SERPL: 12 (ref 10–24)
CALCIUM SERPL-MCNC: 9.4 MG/DL (ref 8.6–10.2)
CHLORIDE SERPL-SCNC: 102 MMOL/L (ref 96–106)
CHOLEST SERPL-MCNC: 189 MG/DL (ref 100–199)
CO2 SERPL-SCNC: 25 MMOL/L (ref 18–29)
CREAT SERPL-MCNC: 0.9 MG/DL (ref 0.76–1.27)
CREAT UR-MCNC: 83.2 MG/DL
CRP SERPL HS-MCNC: 0.72 MG/L (ref 0–3)
EOSINOPHIL # BLD AUTO: 0.1 X10E3/UL (ref 0–0.4)
EOSINOPHIL NFR BLD AUTO: 1 %
ERYTHROCYTE [DISTWIDTH] IN BLOOD BY AUTOMATED COUNT: 13.8 % (ref 12.3–15.4)
EST. AVERAGE GLUCOSE BLD GHB EST-MCNC: 134 MG/DL
GLOBULIN SER CALC-MCNC: 2.6 G/DL (ref 1.5–4.5)
GLUCOSE SERPL-MCNC: 109 MG/DL (ref 65–99)
HBA1C MFR BLD: 6.3 % (ref 4.8–5.6)
HCT VFR BLD AUTO: 37.8 % (ref 37.5–51)
HDLC SERPL-MCNC: 61 MG/DL
HGB BLD-MCNC: 12.5 G/DL (ref 12.6–17.7)
IMM GRANULOCYTES # BLD: 0 X10E3/UL (ref 0–0.1)
IMM GRANULOCYTES NFR BLD: 0 %
INTERPRETATION, 910389: NORMAL
LDLC SERPL CALC-MCNC: 115 MG/DL (ref 0–99)
LYMPHOCYTES # BLD AUTO: 2 X10E3/UL (ref 0.7–3.1)
LYMPHOCYTES NFR BLD AUTO: 32 %
Lab: NORMAL
MCH RBC QN AUTO: 31.6 PG (ref 26.6–33)
MCHC RBC AUTO-ENTMCNC: 33.1 G/DL (ref 31.5–35.7)
MCV RBC AUTO: 96 FL (ref 79–97)
MICROALBUMIN UR-MCNC: 7.4 UG/ML
MONOCYTES # BLD AUTO: 0.5 X10E3/UL (ref 0.1–0.9)
MONOCYTES NFR BLD AUTO: 7 %
NEUTROPHILS # BLD AUTO: 3.8 X10E3/UL (ref 1.4–7)
NEUTROPHILS NFR BLD AUTO: 60 %
PLATELET # BLD AUTO: 312 X10E3/UL (ref 150–379)
POTASSIUM SERPL-SCNC: 4.8 MMOL/L (ref 3.5–5.2)
PROT SERPL-MCNC: 7.1 G/DL (ref 6–8.5)
RBC # BLD AUTO: 3.95 X10E6/UL (ref 4.14–5.8)
SODIUM SERPL-SCNC: 141 MMOL/L (ref 134–144)
TRIGL SERPL-MCNC: 65 MG/DL (ref 0–149)
VLDLC SERPL CALC-MCNC: 13 MG/DL (ref 5–40)
WBC # BLD AUTO: 6.4 X10E3/UL (ref 3.4–10.8)

## 2017-04-27 ENCOUNTER — TELEPHONE (OUTPATIENT)
Dept: FAMILY MEDICINE CLINIC | Age: 67
End: 2017-04-27

## 2017-04-27 NOTE — PROGRESS NOTES
Labs stable at this time. Continue to take medications as prescribed. Avoid high cholesterol high fat meals. Continue to eat a diet that does not include high carbohydrates. HBA1C went from 6.0 to 6.3, which is still good. But just want to make sure that keep this number <7.0 if possible. Keep up the good work!

## 2017-05-24 RX ORDER — HYDROCORTISONE 25 MG/G
CREAM TOPICAL
Qty: 1 TUBE | Refills: 4 | Status: SHIPPED | OUTPATIENT
Start: 2017-05-24 | End: 2018-03-14 | Stop reason: SDUPTHER

## 2017-12-06 ENCOUNTER — OFFICE VISIT (OUTPATIENT)
Dept: FAMILY MEDICINE CLINIC | Age: 67
End: 2017-12-06

## 2017-12-06 VITALS
TEMPERATURE: 98.2 F | HEIGHT: 73 IN | RESPIRATION RATE: 16 BRPM | DIASTOLIC BLOOD PRESSURE: 88 MMHG | HEART RATE: 52 BPM | BODY MASS INDEX: 28.49 KG/M2 | OXYGEN SATURATION: 96 % | WEIGHT: 215 LBS | SYSTOLIC BLOOD PRESSURE: 165 MMHG

## 2017-12-06 DIAGNOSIS — I10 ESSENTIAL HYPERTENSION WITH GOAL BLOOD PRESSURE LESS THAN 140/90: ICD-10-CM

## 2017-12-06 DIAGNOSIS — C61 MALIGNANT NEOPLASM OF PROSTATE (HCC): ICD-10-CM

## 2017-12-06 DIAGNOSIS — E78.2 MIXED HYPERLIPIDEMIA: ICD-10-CM

## 2017-12-06 DIAGNOSIS — E11.9 CONTROLLED TYPE 2 DIABETES MELLITUS WITHOUT COMPLICATION, WITHOUT LONG-TERM CURRENT USE OF INSULIN (HCC): Primary | ICD-10-CM

## 2017-12-06 DIAGNOSIS — Z12.11 SCREENING FOR COLON CANCER: ICD-10-CM

## 2017-12-06 RX ORDER — EZETIMIBE AND SIMVASTATIN 10; 40 MG/1; MG/1
1 TABLET ORAL
Qty: 90 TAB | Refills: 1 | Status: SHIPPED | OUTPATIENT
Start: 2017-12-06 | End: 2021-10-09

## 2017-12-06 RX ORDER — TADALAFIL 5 MG/1
5 TABLET, FILM COATED ORAL
Refills: 5 | COMMUNITY
Start: 2017-10-30 | End: 2017-12-06 | Stop reason: SDUPTHER

## 2017-12-06 RX ORDER — LISINOPRIL 20 MG/1
20 TABLET ORAL DAILY
Qty: 90 TAB | Refills: 1 | Status: SHIPPED | OUTPATIENT
Start: 2017-12-06 | End: 2018-06-01 | Stop reason: SDUPTHER

## 2017-12-06 RX ORDER — GLIMEPIRIDE 4 MG/1
4 TABLET ORAL
Qty: 90 TAB | Refills: 1 | Status: SHIPPED | OUTPATIENT
Start: 2017-12-06 | End: 2018-03-14 | Stop reason: SDUPTHER

## 2017-12-06 RX ORDER — TADALAFIL 5 MG/1
5 TABLET, FILM COATED ORAL
Qty: 24 TAB | Refills: 1 | Status: SHIPPED | OUTPATIENT
Start: 2017-12-06

## 2017-12-06 RX ORDER — GLIMEPIRIDE 4 MG/1
4 TABLET ORAL
Refills: 1 | COMMUNITY
Start: 2017-10-30 | End: 2017-12-06 | Stop reason: SDUPTHER

## 2017-12-06 NOTE — ASSESSMENT & PLAN NOTE
Stable, based on history, physical exam and review of pertinent labs, studies and medications; meds reconciled; continue current treatment plan. This is managed by Urology as well. Patient has an upcoming appointment later this month or early next year. He will let me know for sure. Key Oncology Meds     Patient is on no Oncologic meds. Key Pain Meds     The patient is on no pain meds. Lab Results   Component Value Date/Time    WBC 6.4 04/20/2017 09:26 AM    ABS.  NEUTROPHILS 3.8 04/20/2017 09:26 AM    HGB 12.5 04/20/2017 09:26 AM    HCT 37.8 04/20/2017 09:26 AM    PLATELET 929 54/74/8808 09:26 AM    Creatinine 0.90 04/20/2017 09:26 AM    BUN 11 04/20/2017 09:26 AM    ALT (SGPT) 13 04/20/2017 09:26 AM    AST (SGOT) 13 04/20/2017 09:26 AM    Albumin 4.5 04/20/2017 09:26 AM

## 2017-12-06 NOTE — MR AVS SNAPSHOT
Visit Information Date & Time Provider Department Dept. Phone Encounter #  
 12/6/2017 10:30 AM Galina WilliamsonRachid [phone] [id number] Follow-up Instructions Return in about 6 months (around 6/6/2018), or if symptoms worsen or fail to improve. Upcoming Health Maintenance Date Due DTaP/Tdap/Td series (1 - Tdap) 8/31/1973 EYE EXAM RETINAL OR DILATED Q1 6/12/2014 COLONOSCOPY 9/12/2014 GLAUCOMA SCREENING Q2Y 8/31/2017 Pneumococcal 65+ High/Highest Risk (1 of 2 - PCV13) 8/31/2017 MICROALBUMIN Q1 4/20/2018 LIPID PANEL Q1 4/20/2018 HEMOGLOBIN A1C Q6M 6/6/2018 FOOT EXAM Q1 12/6/2018 MEDICARE YEARLY EXAM 12/7/2018 Allergies as of 12/6/2017  Review Complete On: 12/6/2017 By: Galina Williamson MD  
 No Known Allergies Current Immunizations  Reviewed on 4/20/2017 No immunizations on file. Not reviewed this visit You Were Diagnosed With   
  
 Codes Comments Controlled type 2 diabetes mellitus without complication, without long-term current use of insulin ([facility]    -  Primary ICD-10-CM: E11.9 ICD-9-CM: 250.00 Essential hypertension with goal blood pressure less than 140/90     ICD-10-CM: I10 
ICD-9-CM: 401.9 Mixed hyperlipidemia     ICD-10-CM: E78.2 ICD-9-CM: 272.2 Screening for colon cancer     ICD-10-CM: Z12.11 ICD-9-CM: V76.51 Malignant neoplasm of prostate [facility])     ICD-10-CM: J83 ICD-9-CM: 378 Vitals BP Pulse Temp Resp Height(growth percentile) Weight(growth percentile) 165/88 (BP 1 Location: Right arm, BP Patient Position: Sitting) (!) 52 98.2 °F (36.8 °C) (Oral) 16 6' 1\" (1.854 m) 215 lb (97.5 kg) SpO2 BMI Smoking Status 96% 28.37 kg/m2 Never Smoker Vitals History BMI and BSA Data Body Mass Index Body Surface Area  
 28.37 kg/m 2 2.24 m 2 Preferred Pharmacy Pharmacy Name Phone [facility]/PHARMACY #[id number]- [place], [address]) [phone] Your Updated Medication List  
  
   
This list is accurate as of: 17 11:45 AM.  Always use your most recent med list.  
  
  
  
  
 aspirin delayed-release 81 mg tablet Take 81 mg by mouth daily. CIALIS 5 mg tablet Generic drug:  tadalafil Take 1 Tab by mouth daily as needed. diph,pertuss(acel),tetanus vac(PF) 2 Lf-(2.5-5-3-5 mcg)-5Lf/0.5 mL Syrg vaccine Commonly known as:  ADACEL  
0.5 mL by IntraMUSCular route once for 1 dose. ezetimibe-simvastatin 10-40 mg per tablet Commonly known as:  Vytorin 10/40 Take 1 Tab by mouth nightly. glimepiride 4 mg tablet Commonly known as:  AMARYL Take 1 Tab by mouth once over twenty-four (24) hours. glucose blood VI test strips strip Commonly known as:  ONETOUCH ULTRA TEST Check BS daily  
  
 hydrocortisone 2.5 % topical cream  
Commonly known as:  HYTONE  
APPLY A THIN LAYER TO AFFECTED AREA TWO TIMES A DAY  
  
 lisinopril 20 mg tablet Commonly known as:  [employer] [facility] Take 1 Tab by mouth daily. omega-3 fatty acids Cap Take 1,000 mg by mouth daily. pneumococcal 23-valent 25 mcg/0.5 mL injection Commonly known as:  PNEUMOVAX 23  
0.5 mL by IntraMUSCular route PRIOR TO DISCHARGE for 1 dose. SITagliptin-metFORMIN  mg per tablet Commonly known as:  Hulan Freddy Take 1 Tab by mouth two (2) times daily (with meals). varicella zoster vaccine live 19,400 unit/0.65 mL Susr injection Commonly known as:  ZOSTAVAX  
1 Vial by SubCUTAneous route once for 1 dose. Prescriptions Printed Refills diph,pertuss,acel,,tetanus vac,PF, (ADACEL) 2 Lf-(2.5-5-3-5 mcg)-5Lf/0.5 mL syrg vaccine 0 Si.5 mL by IntraMUSCular route once for 1 dose. Class: Print  Route: IntraMUSCular  
 varicella zoster vaccine live (ZOSTAVAX) 19,400 unit/0.65 mL susr injection 0 Si Vial by SubCUTAneous route once for 1 dose. Class: Print Route: SubCUTAneous  
 pneumococcal 23-valent (PNEUMOVAX 23) 25 mcg/0.5 mL injection 0 Si.5 mL by IntraMUSCular route PRIOR TO DISCHARGE for 1 dose. Class: Print Route: IntraMUSCular Prescriptions Sent to Pharmacy Refills  
 glimepiride (AMARYL) 4 mg tablet 1 Sig: Take 1 Tab by mouth once over twenty-four (24) hours. Class: Normal  
 Pharmacy: [facility]/pharmacy #[id number] [address], [address] ([place] [address]) Ph #: [phone] Route: Oral  
 CIALIS 5 mg tablet 1 Sig: Take 1 Tab by mouth daily as needed. Class: Normal  
 Pharmacy: [facility]/pharmacy #[id number]- [address], [address] ([place] [address]) Ph #: [phone] Route: Oral  
 SITagliptin-metFORMIN (JANUMET)  mg per tablet 1 Sig: Take 1 Tab by mouth two (2) times daily (with meals). Class: Normal  
 Pharmacy: [facility]/pharmacy #[id number] [address], [address] ([place] [address]) Ph #: [phone] Route: Oral  
 lisinopril (PRINIVIL, ZESTRIL) 20 mg tablet 1 Sig: Take 1 Tab by mouth daily. Class: Normal  
 Pharmacy: [facility]/pharmacy #[id number]- [address], [address] ([place] [address]) Ph #: [phone] Route: Oral  
 ezetimibe-simvastatin (VYTORIN 10/40) 10-40 mg per tablet 1 Sig: Take 1 Tab by mouth nightly. Class: Normal  
 Pharmacy: [facility]/pharmacy #[id number]- [address], [address] ([place] [address]) Ph #: [phone] Route: Oral  
  
We Performed the Following CBC WITH AUTOMATED DIFF [78161 CPT(R)] CRP, HIGH SENSITIVITY [82221 CPT(R)] HEMOGLOBIN A1C WITH EAG [37832 CPT(R)] LIPID PANEL [46307 CPT(R)] METABOLIC PANEL, COMPREHENSIVE [47245 CPT(R)] OCCULT BLOOD, IMMUNOASSAY (FIT) [id number] CPT(R)] REFERRAL TO GASTROENTEROLOGY [JZT52 Custom] REFERRAL TO GASTROENTEROLOGY [XAN95 Custom] Comments:  
 Needs screening colonoscopy. He has never had one before. Follow-up Instructions Return in about 6 months (around 6/6/2018), or if symptoms worsen or fail to improve. Referral Information Referral ID Referred By Referred To  
  
 [id number] Trav JUNIOR Not Available Visits Status Start Date End Date 1 New Request 12/6/17 12/6/18 If your referral has a status of pending review or denied, additional information will be sent to support the outcome of this decision. Referral ID Referred By Referred To  
 [id number] Nina   
   [address] [facility] [address]  [place], [address] [facility] Visits Status Start Date End Date 1 New Request 12/6/17 12/6/18 If your referral has a status of pending review or denied, additional information will be sent to support the outcome of this decision. Introducing \[facility] [place] [facility]\"" & HEALTH SERVICES! [facility] introduces [employer] patient portal. Now you can access parts of your medical record, email your doctor's office, and request medication refills online. 1. In your internet browser, go to [email or web address] 2. Click on the First Time User? Click Here link in the Sign In box. You will see the New Member Sign Up page. 3. Enter your [employer] Access Code exactly as it appears below. You will not need to use this code after youve completed the sign-up process. If you do not sign up before the expiration date, you must request a new code. · [employer] Access Code: [email or web address] Expires: 3/6/2018 11:45 AM 
 
4. Enter the last four digits of your Social Security Number (xxxx) and Date of Birth (mm/dd/yyyy) as indicated and click Submit. You will be taken to the next sign-up page. 5. Create a [employer] ID.  This will be your [employer] login ID and cannot be changed, so think of one that is secure and easy to remember. 6. Create a [employer] password. You can change your password at any time. 7. Enter your Password Reset Question and Answer. This can be used at a later time if you forget your password. 8. Enter your e-mail address. You will receive e-mail notification when new information is available in [address] 9. Click Sign Up. You can now view and download portions of your medical record. 10. Click the Download Summary menu link to download a portable copy of your medical information. If you have questions, please visit the Frequently Asked Questions section of the [employer] website. Remember, [employer] is NOT to be used for urgent needs. For medical emergencies, dial 911. Now available from your iPhone and Android! Please provide this summary of care documentation to your next provider. Your primary care clinician is listed as [address]. If you have any questions after today's visit, please call [phone].

## 2017-12-06 NOTE — PROGRESS NOTES
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 male. HPI Comments: 72M who presents for 3-4 month follow up of T2DM, HTN, HLD and HM. He has been doing well and no major concerns. Needs refills of his medications. Also, will need fasting labs. ROS:  Review of Systems   All other systems reviewed and are negative. OBJECTIVE:  /88 (BP 1 Location: Right arm, BP Patient Position: Sitting)  Pulse (!) 52  Temp 98.2 °F (36.8 °C) (Oral)   Resp 16  Ht 6' 1\" (1.854 m)  Wt 215 lb (97.5 kg)  SpO2 96%  BMI 28.37 kg/m2  Physical Exam   Constitutional: He appears well-developed. HENT:   Head: Normocephalic and atraumatic. Eyes: Conjunctivae and EOM are normal. Pupils are equal, round, and reactive to light. Neck: Normal range of motion. Cardiovascular: Normal rate and regular rhythm. Pulmonary/Chest: Effort normal and breath sounds normal.   Neurological: He is alert. Skin: Skin is warm. Psychiatric: He has a normal mood and affect. His behavior is normal.   Nursing note and vitals reviewed. ASSESSMENT and PLAN    ICD-10-CM ICD-9-CM    1. Controlled type 2 diabetes mellitus without complication, without long-term current use of insulin (HCC) E11.9 250.00 SITagliptin-metFORMIN (JANUMET)  mg per tablet      HEMOGLOBIN A1C WITH EAG      METABOLIC PANEL, COMPREHENSIVE   2.  Essential hypertension with goal blood pressure less than 140/90 I10 401.9 lisinopril (PRINIVIL, ZESTRIL) 20 mg tablet      METABOLIC PANEL, COMPREHENSIVE   3. Mixed hyperlipidemia E78.2 272.2 ezetimibe-simvastatin (VYTORIN 10/40) 10-40 mg per tablet      LIPID PANEL      CRP, HIGH SENSITIVITY      CBC WITH AUTOMATED DIFF   4. Screening for colon cancer Z12.11 V76.51 REFERRAL TO GASTROENTEROLOGY      OCCULT BLOOD, IMMUNOASSAY (FIT)      REFERRAL TO GASTROENTEROLOGY       I have discussed the diagnosis with the patient and the intended treatment plan as seen in the above orders. The patient has received an after-visit summary and questions were answered concerning future plans. Asked to return should symptoms worsen or not improve with treatment. Any pending labs and studies will be relayed to patient when they become available. Pt verbalizes understanding of plan of care and denies further questions or concerns at this time. Follow-up Disposition:  Return in about 6 months (around 6/6/2018), or if symptoms worsen or fail to improve.

## 2017-12-06 NOTE — PROGRESS NOTES
Identified pt with two pt identifiers(name and ). Chief Complaint   Patient presents with    Complete Physical     per HM pt is due for eye exam, foot exam    Labs     per HM pt is due for HgbA1c - NPO since midnight    Immunization/Injection     per  review pt is due for TDAP, Pneumonia vaccine and influenza vaccine    Medication Refill     needs 90 day refills per insurance - reports some readings may be elevated today as he has been out of his medications x 3 days        Health Maintenance Due   Topic    DTaP/Tdap/Td series (1 - Tdap)    EYE EXAM RETINAL OR DILATED Q1     COLONOSCOPY     FOOT EXAM Q1     Influenza Age 5 to Adult     GLAUCOMA SCREENING Q2Y     Pneumococcal 65+ High/Highest Risk (1 of 2 - PCV13)    MEDICARE YEARLY EXAM     HEMOGLOBIN A1C Q6M        Wt Readings from Last 3 Encounters:   17 215 lb (97.5 kg)   17 223 lb (101.2 kg)   10/04/16 224 lb (101.6 kg)     Temp Readings from Last 3 Encounters:   17 98.2 °F (36.8 °C) (Oral)   17 98 °F (36.7 °C) (Temporal)   10/04/16 97.6 °F (36.4 °C) (Oral)     BP Readings from Last 3 Encounters:   17 165/88   17 124/72   10/04/16 120/72     Pulse Readings from Last 3 Encounters:   17 (!) 52   17 93   10/04/16 60         Learning Assessment:  :     Learning Assessment 2016   PRIMARY LEARNER Patient Patient   HIGHEST LEVEL OF EDUCATION - PRIMARY LEARNER  GRADUATED HIGH SCHOOL OR GED -   BARRIERS PRIMARY LEARNER NONE -   CO-LEARNER CAREGIVER No -   PRIMARY LANGUAGE ENGLISH ENGLISH   LEARNER PREFERENCE PRIMARY OTHER (COMMENT) DEMONSTRATION   ANSWERED BY self patient   RELATIONSHIP SELF SELF       Depression Screening:  :     PHQ over the last two weeks 2017   Little interest or pleasure in doing things Not at all   Feeling down, depressed or hopeless Not at all   Total Score PHQ 2 0       Fall Risk Assessment:  :     Fall Risk Assessment, last 12 mths 2017   Able to walk? Yes   Fall in past 12 months? No       Abuse Screening:  :     Abuse Screening Questionnaire 4/20/2017 5/24/2016 4/21/2014   Do you ever feel afraid of your partner? N N N   Are you in a relationship with someone who physically or mentally threatens you? N N N   Is it safe for you to go home? Pam Powers       Coordination of Care Questionnaire:  :     1) Have you been to an emergency room, urgent care clinic since your last visit? no   Hospitalized since your last visit? no             2) Have you seen or consulted any other health care providers outside of [address] [place] [address] since your last visit? 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 no  Are you interested in receiving information about Advance Directives? no    Patient is accompanied by self I have received verbal consent from Cindy Morgan to discuss any/all medical information while they are present in the room. Reviewed record in preparation for visit and have obtained necessary documentation. Medication reconciliation up to date and corrected with patient at this time.

## 2017-12-07 LAB
ALBUMIN SERPL-MCNC: 5 G/DL (ref 3.6–4.8)
ALBUMIN/GLOB SERPL: 1.7 {RATIO} (ref 1.2–2.2)
ALP SERPL-CCNC: 51 IU/L (ref 39–117)
ALT SERPL-CCNC: 24 IU/L (ref 0–44)
AST SERPL-CCNC: 21 IU/L (ref 0–40)
BASOPHILS # BLD AUTO: 0 X10E3/UL (ref 0–0.2)
BASOPHILS NFR BLD AUTO: 0 %
BILIRUB SERPL-MCNC: 0.4 MG/DL (ref 0–1.2)
BUN SERPL-MCNC: 14 MG/DL (ref 8–27)
BUN/CREAT SERPL: 13 (ref 10–24)
CALCIUM SERPL-MCNC: 10.1 MG/DL (ref 8.6–10.2)
CHLORIDE SERPL-SCNC: 101 MMOL/L (ref 96–106)
CHOLEST SERPL-MCNC: 210 MG/DL (ref 100–199)
CO2 SERPL-SCNC: 28 MMOL/L (ref 18–29)
CREAT SERPL-MCNC: 1.07 MG/DL (ref 0.76–1.27)
CRP SERPL HS-MCNC: 0.59 MG/L (ref 0–3)
EOSINOPHIL # BLD AUTO: 0.1 X10E3/UL (ref 0–0.4)
EOSINOPHIL NFR BLD AUTO: 1 %
ERYTHROCYTE [DISTWIDTH] IN BLOOD BY AUTOMATED COUNT: 13.9 % (ref 12.3–15.4)
EST. AVERAGE GLUCOSE BLD GHB EST-MCNC: 128 MG/DL
GFR SERPLBLD CREATININE-BSD FMLA CKD-EPI: 72 ML/MIN/1.73
GFR SERPLBLD CREATININE-BSD FMLA CKD-EPI: 84 ML/MIN/1.73
GLOBULIN SER CALC-MCNC: 3 G/DL (ref 1.5–4.5)
GLUCOSE SERPL-MCNC: 83 MG/DL (ref 65–99)
HBA1C MFR BLD: 6.1 % (ref 4.8–5.6)
HCT VFR BLD AUTO: 41.9 % (ref 37.5–51)
HDLC SERPL-MCNC: 66 MG/DL
HGB BLD-MCNC: 13.6 G/DL (ref 13–17.7)
IMM GRANULOCYTES # BLD: 0 X10E3/UL (ref 0–0.1)
IMM GRANULOCYTES NFR BLD: 0 %
INTERPRETATION, 910389: NORMAL
LDLC SERPL CALC-MCNC: 126 MG/DL (ref 0–99)
LYMPHOCYTES # BLD AUTO: 2.3 X10E3/UL (ref 0.7–3.1)
LYMPHOCYTES NFR BLD AUTO: 34 %
Lab: NORMAL
MCH RBC QN AUTO: 31.8 PG (ref 26.6–33)
MCHC RBC AUTO-ENTMCNC: 32.5 G/DL (ref 31.5–35.7)
MCV RBC AUTO: 98 FL (ref 79–97)
MONOCYTES # BLD AUTO: 0.6 X10E3/UL (ref 0.1–0.9)
MONOCYTES NFR BLD AUTO: 9 %
NEUTROPHILS # BLD AUTO: 3.8 X10E3/UL (ref 1.4–7)
NEUTROPHILS NFR BLD AUTO: 56 %
PLATELET # BLD AUTO: 321 X10E3/UL (ref 150–379)
POTASSIUM SERPL-SCNC: 5 MMOL/L (ref 3.5–5.2)
PROT SERPL-MCNC: 8 G/DL (ref 6–8.5)
RBC # BLD AUTO: 4.28 X10E6/UL (ref 4.14–5.8)
SODIUM SERPL-SCNC: 142 MMOL/L (ref 134–144)
TRIGL SERPL-MCNC: 90 MG/DL (ref 0–149)
VLDLC SERPL CALC-MCNC: 18 MG/DL (ref 5–40)
WBC # BLD AUTO: 6.8 X10E3/UL (ref 3.4–10.8)

## 2017-12-11 NOTE — PROGRESS NOTES
Labs are stable and looked pretty good. Diabetes well controlled with HBA1C = 6.1  Lipids just slightly higher this time. Continue with diet control and medications. Return in 6-months for follow up and labs.

## 2018-03-15 RX ORDER — GLIMEPIRIDE 4 MG/1
TABLET ORAL
Qty: 90 TAB | Refills: 1 | Status: SHIPPED | OUTPATIENT
Start: 2018-03-15 | End: 2020-06-17 | Stop reason: SDUPTHER

## 2018-03-15 RX ORDER — HYDROCORTISONE 25 MG/G
CREAM TOPICAL
Qty: 28.35 G | Refills: 2 | Status: SHIPPED | OUTPATIENT
Start: 2018-03-15 | End: 2018-10-18 | Stop reason: SDUPTHER

## 2018-06-01 DIAGNOSIS — I10 ESSENTIAL HYPERTENSION WITH GOAL BLOOD PRESSURE LESS THAN 140/90: ICD-10-CM

## 2018-06-01 RX ORDER — LISINOPRIL 20 MG/1
TABLET ORAL
Qty: 90 TAB | Refills: 1 | Status: SHIPPED | OUTPATIENT
Start: 2018-06-01 | End: 2018-12-14 | Stop reason: SDUPTHER

## 2018-06-27 ENCOUNTER — HOSPITAL ENCOUNTER (OUTPATIENT)
Dept: LAB | Age: 68
Discharge: HOME | End: 2018-06-27
Payer: MEDICARE

## 2018-06-27 ENCOUNTER — OFFICE VISIT (OUTPATIENT)
Dept: FAMILY MEDICINE CLINIC | Age: 68
End: 2018-06-27

## 2018-06-27 VITALS
SYSTOLIC BLOOD PRESSURE: 120 MMHG | BODY MASS INDEX: 29 KG/M2 | HEIGHT: 73 IN | TEMPERATURE: 98.3 F | HEART RATE: 49 BPM | DIASTOLIC BLOOD PRESSURE: 68 MMHG | WEIGHT: 218.8 LBS | RESPIRATION RATE: 20 BRPM | OXYGEN SATURATION: 97 %

## 2018-06-27 DIAGNOSIS — E55.9 VITAMIN D DEFICIENCY: ICD-10-CM

## 2018-06-27 DIAGNOSIS — E78.2 MIXED HYPERLIPIDEMIA: ICD-10-CM

## 2018-06-27 DIAGNOSIS — C61 MALIGNANT NEOPLASM OF PROSTATE (HCC): ICD-10-CM

## 2018-06-27 DIAGNOSIS — E11.9 TYPE 2 DIABETES MELLITUS WITHOUT COMPLICATION, WITHOUT LONG-TERM CURRENT USE OF INSULIN (HCC): Primary | ICD-10-CM

## 2018-06-27 DIAGNOSIS — Z12.11 SCREENING FOR COLON CANCER: ICD-10-CM

## 2018-06-27 DIAGNOSIS — Z13.5 SCREENING FOR GLAUCOMA: ICD-10-CM

## 2018-06-27 PROCEDURE — 84153 ASSAY OF PSA TOTAL: CPT

## 2018-06-27 PROCEDURE — 86141 C-REACTIVE PROTEIN HS: CPT

## 2018-06-27 PROCEDURE — 80061 LIPID PANEL: CPT

## 2018-06-27 PROCEDURE — 36415 COLL VENOUS BLD VENIPUNCTURE: CPT

## 2018-06-27 PROCEDURE — 85025 COMPLETE CBC W/AUTO DIFF WBC: CPT

## 2018-06-27 PROCEDURE — 82043 UR ALBUMIN QUANTITATIVE: CPT

## 2018-06-27 PROCEDURE — 80053 COMPREHEN METABOLIC PANEL: CPT

## 2018-06-27 PROCEDURE — 83036 HEMOGLOBIN GLYCOSYLATED A1C: CPT

## 2018-06-27 NOTE — PROGRESS NOTES
CC:  Chief Complaint   Patient presents with    Follow Up Chronic Condition     T2DM, Htn, HLD and labs      Subjective:     Ary Ward is a 74 y.o. male seen for 6-month follow up. He was last seen in December 2017. Today, he is fasting and also needs to have HM updated. He has no specific complaints or issues. He has a h/o neoplasm of the prostate. Has not seen his urologist in follow up in several years. Last PSA was done in 2016 and needs repeat today. He also has hypertension and hyperlipidemia. Diabetic Review of Systems - medication compliance: compliant all of the time, diabetic diet compliance: compliant all of the time, home glucose monitoring: is not performed. Other symptoms and concerns: None. Patient Active Problem List    Diagnosis Date Noted    Aortic stenosis 05/04/2015    Bradycardia 04/23/2014    Malignant neoplasm of prostate ([facility] 09/25/2012    Other atopic dermatitis and related conditions 01/26/2011    Hypogonadism male 09/13/2010    DM type 2 (diabetes mellitus, type 2) ([facility] 06/10/2009    Mixed hyperlipidemia 06/10/2009    HTN (hypertension) 06/10/2009    Vitamin D deficiency 06/10/2009    Cardiac murmur 06/10/2009     Current Outpatient Prescriptions   Medication Sig Dispense Refill    diph,pertuss,acel,,tetanus vac,PF, (ADACEL) 2 Lf-(2.5-5-3-5 mcg)-5Lf/0.5 mL syrg vaccine 0.5 mL by IntraMUSCular route once for 1 dose. 0.5 mL 0    pneumococcal 13 jimmie conj dip (PREVNAR-13) 0.5 mL syrg injection 0.5 mL by IntraMUSCular route PRIOR TO DISCHARGE for 1 dose. 0.5 mL 0    lisinopril (PRINIVIL, ZESTRIL) 20 mg tablet TAKE 1 TABLET BY MOUTH DAILY 90 Tab 1    glimepiride (AMARYL) 4 mg tablet TAKE 1 TABLET BY MOUTH EVERY MORNING 90 Tab 1    hydrocortisone (HYTONE) 2.5 % topical cream APPLY A THIN LAYER TO THE AFFECTED AREA TWICE A DAY 28.35 g 2    CIALIS 5 mg tablet Take 1 Tab by mouth daily as needed.  24 Tab 1    SITagliptin-metFORMIN (JANUMET)  mg per tablet Take 1 Tab by mouth two (2) times daily (with meals). 180 Tab 1    ezetimibe-simvastatin (VYTORIN 10/40) 10-40 mg per tablet Take 1 Tab by mouth nightly. 90 Tab 1    glucose blood VI test strips (ONETOUCH ULTRA TEST) strip Check BS daily 100 Strip 3    aspirin delayed-release 81 mg tablet Take 81 mg by mouth daily.  omega-3 fatty acids cap Take 1,000 mg by mouth daily.  pneumococcal 23-valent (PNEUMOVAX 23) 25 mcg/0.5 mL injection 0.5 mL by IntraMUSCular route PRIOR TO DISCHARGE for 1 dose. 0.5 mL 0     No Known Allergies  Past Medical History:   Diagnosis Date    Colon polyps     Contact dermatitis and other eczema, due to unspecified cause     DM type 2 (diabetes mellitus, type 2) ([facility] 6/10/2009    Essential hypertension, benign     Hypertension     Malignant neoplasm of prostate ([facility] 9/25/2012    Mixed hyperlipidemia 6/10/2009    Mixed hyperlipidemia 6/10/2009    Prostate cancer ([facility] 11/2009    resection prostate     Past Surgical History:   Procedure Laterality Date    HX PROSTATECTOMY       Family History   Problem Relation Age of Onset    Diabetes Mother     Hypertension Mother     Diabetes Father      Social History   Substance Use Topics    Smoking status: Never Smoker    Smokeless tobacco: Never Used    Alcohol use No         Review of Systems  Pertinent items are noted in HPI. Objective:     Visit Vitals    /68 (BP 1 Location: Right arm, BP Patient Position: Sitting)  Comment: Manual    Pulse (!) 49    Temp 98.3 °F (36.8 °C) (Oral)    Resp 20    Ht 6' 1\" (1.854 m)    Wt 218 lb 12.8 oz (99.2 kg)    SpO2 97%    BMI 28.87 kg/m2     Appearance: alert, well appearing, and in no distress and overweight.   Exam: heart sounds normal rate, regular rhythm, normal S1, S2, no murmurs, rubs, clicks or gallops, S1 and S2 normal, chest clear, no hepatosplenomegaly, no carotid bruits  Lab review: orders written for new lab studies as appropriate; see orders. Assessment/Plan:       ICD-10-CM ICD-9-CM    1. Type 2 diabetes mellitus without complication, without long-term current use of insulin (HCC) E11.9 250.00 HEMOGLOBIN A1C WITH EAG      MICROALBUMIN, UR, RAND W/ MICROALB/CREAT RATIO      REFERRAL TO OPHTHALMOLOGY   2. Mixed hyperlipidemia E78.2 272.2 LIPID PANEL      CRP, HIGH SENSITIVITY      METABOLIC PANEL, COMPREHENSIVE      CBC WITH AUTOMATED DIFF   3. Vitamin D deficiency E55.9 268.9    4. [facility] C61 185 PSA, DIAGNOSTIC (PROSTATE SPECIFIC AG)   5. Screening for colon cancer Z12.11 V76.51 REFERRAL TO GASTROENTEROLOGY      REFERRAL TO OPHTHALMOLOGY   6. Screening for glaucoma Z13.5 V80.1      67M who is here for routine 6-month follow up with fasting labs and HM update. He needs referral to GI and ophthalmology as above. He has no specific concerns or complaints today. I have discussed the diagnosis with the patient and the intended treatment plan as seen in the above orders. The patient has received an after-visit summary and questions were answered concerning future plans. Asked to return should symptoms worsen or not improve with treatment. Any pending labs and studies will be relayed to patient when they become available. Pt verbalizes understanding of plan of care and denies further questions or concerns at this time. Follow-up Disposition:  Return in about 6 months (around 12/27/2018), or if symptoms worsen or fail to improve.

## 2018-06-27 NOTE — PATIENT INSTRUCTIONS
Diabetes Foot Health: Care Instructions  Your Care Instructions    When you have diabetes, your feet need extra care and attention. Diabetes can damage the nerve endings and blood vessels in your feet, making you less likely to notice when your feet are injured. Diabetes also limits your body's ability to fight infection and get blood to areas that need it. If you get a minor foot injury, it could become an ulcer or a serious infection. With good foot care, you can prevent most of these problems. Caring for your feet can be quick and easy. Most of the care can be done when you are bathing or getting ready for bed. Follow-up care is a key part of your treatment and safety. Be sure to make and go to all appointments, and call your doctor if you are having problems. It's also a good idea to know your test results and keep a list of the medicines you take. How can you care for yourself at home? · Keep your blood sugar close to normal by watching what and how much you eat, monitoring blood sugar, taking medicines if prescribed, and getting regular exercise. · Do not smoke. Smoking affects blood flow and can make foot problems worse. If you need help quitting, talk to your doctor about stop-smoking programs and medicines. These can increase your chances of quitting for good. · Eat a diet that is low in fats. High fat intake can cause fat to build up in your blood vessels and decrease blood flow. · Inspect your feet daily for blisters, cuts, cracks, or sores. If you cannot see well, use a mirror or have someone help you. · Take care of your feet:  [facility] AUTHORITY your feet every day. Use warm (not hot) water. Check the water temperature with your wrists or other part of your body, not your feet. ¨ Dry your feet well. Pat them dry. Do not rub the skin on your feet too hard. Dry well between your toes. If the skin on your feet stays moist, bacteria or a fungus can grow, which can lead to infection.   ¨ Keep your skin soft. Use moisturizing skin cream to keep the skin on your feet soft and prevent calluses and cracks. But do not put the cream between your toes, and stop using any cream that causes a rash. ¨ Clean underneath your toenails carefully. Do not use a sharp object to clean underneath your toenails. Use the blunt end of a nail file or other rounded tool. ¨ Trim and file your toenails straight across to prevent ingrown toenails. Use a nail clipper, not scissors. Use an emery board to smooth the edges. · Change socks daily. Socks without seams are best, because seams often rub the feet. You can find socks for people with diabetes from specialty catalogs. · Look inside your shoes every day for things like gravel or torn linings, which could cause blisters or sores. · Buy shoes that fit well:  ¨ Look for shoes that have plenty of space around the toes. This helps prevent bunions and blisters. ¨ Try on shoes while wearing the kind of socks you will usually wear with the shoes. ¨ Avoid plastic shoes. They may rub your feet and cause blisters. Good shoes should be made of materials that are flexible and breathable, such as leather or cloth. ¨ Break in new shoes slowly by wearing them for no more than an hour a day for several days. Take extra time to check your feet for red areas, blisters, or other problems after you wear new shoes. · Do not go barefoot. Do not wear sandals, and do not wear shoes with very thin soles. Thin soles are easy to puncture. They also do not protect your feet from hot pavement or cold weather. · Have your doctor check your feet during each visit. If you have a foot problem, see your doctor. Do not try to treat an early foot problem at home. Home remedies or treatments that you can buy without a prescription (such as corn removers) can be harmful. · Always get early treatment for foot problems. A minor irritation can lead to a major problem if not properly cared for early.   When should you call for help? Call your doctor now or seek immediate medical care if:  ? · You have a foot sore, an ulcer or break in the skin that is not healing after 4 days, bleeding corns or calluses, or an ingrown toenail. ? · You have blue or black areas, which can mean bruising or blood flow problems. ? · You have peeling skin or tiny blisters between your toes or cracking or oozing of the skin. ? · You have a fever for more than 24 hours and a foot sore. ? · You have new numbness or tingling in your feet that does not go away after you move your feet or change positions. ? · You have unexplained or unusual swelling of the foot or ankle. ? Watch closely for changes in your health, and be sure to contact your doctor if:  ? · You cannot do proper foot care. Where can you learn more? Go to http://drarick-dyllan.info/. Enter A739 in the search box to learn more about \"Diabetes Foot Health: Care Instructions. \"  Current as of: March 13, 2017  Content Version: 11.4  © 3226-4963 [employer]. Care instructions adapted under license by [employer] (which disclaims liability or warranty for this information). If you have questions about a medical condition or this instruction, always ask your healthcare professional. [address] any warranty or liability for your use of this information.

## 2018-06-27 NOTE — MR AVS SNAPSHOT
Heidi Skelton 
 
 
 [address] [facility] 
[phone] Patient: Sophie Coelho MRN:  Vibha Ablebean Visit Information Date & Time Provider Department Dept. Phone Encounter #  
 6/27/2018 10:00 AM Rachid Parson [id number] Follow-up Instructions Return in about 6 months (around 12/27/2018), or if symptoms worsen or fail to improve. Your Appointments 7/12/2018 10:00 AM  
Medicare Physical with Shant Hobbs MD  
[address] [place] [address] [facility]: Medicare wellness visit [address] [facility]  
  
   
 [address] Upcoming Health Maintenance Date Due DTaP/Tdap/Td series (1 - Tdap) 8/31/1973 EYE EXAM RETINAL OR DILATED Q1 6/12/2014 COLONOSCOPY 9/12/2014 GLAUCOMA SCREENING Q2Y 8/31/2017 Pneumococcal 65+ High/Highest Risk (1 of 2 - PCV13) 8/31/2017 Influenza Age 5 to Adult 8/1/2018 FOOT EXAM Q1 12/6/2018 LIPID PANEL Q1 12/6/2018 HEMOGLOBIN A1C Q6M 12/27/2018 MICROALBUMIN Q1 6/27/2019 Allergies as of 6/27/2018  Review Complete On: 6/27/2018 By: Shant Hobbs MD  
 No Known Allergies Current Immunizations  Reviewed on 6/27/2018 No immunizations on file. Reviewed by Sahnt Hobbs MD on 6/27/2018 at 10:12 AM  
 Reviewed by Shant Hobbs MD on 6/27/2018 at 10:12 AM  
You Were Diagnosed With   
  
 Codes Comments Type 2 diabetes mellitus without complication, without long-term current use of insulin (HCC)    -  Primary ICD-10-CM: E11.9 ICD-9-CM: 250.00 Mixed hyperlipidemia     ICD-10-CM: E78.2 ICD-9-CM: 272.2 Vitamin D deficiency     ICD-10-CM: E55.9 ICD-9-CM: 268.9 Malignant neoplasm of prostate [facility])     ICD-10-CM: X81 ICD-9-CM: 209  Screening for colon cancer     ICD-10-CM: Z12.11 
 ICD-9-CM: V76.51 Screening for glaucoma     ICD-10-CM: Z13.5 ICD-9-CM: V80.1 Vitals BP Pulse Temp Resp Height(growth percentile) Weight(growth percentile) 120/68 (BP 1 Location: Right arm, BP Patient Position: Sitting) (!) 49 98.3 °F (36.8 °C) (Oral) 20 6' 1\" (1.854 m) 218 lb 12.8 oz (99.2 kg) SpO2 BMI Smoking Status 97% 28.87 kg/m2 Never Smoker BMI and BSA Data Body Mass Index Body Surface Area  
 28.87 kg/m 2 2.26 m 2 Preferred Pharmacy Pharmacy Name Phone [id number] [place], [address] ([address]) [phone] Your Updated Medication List  
  
   
This list is accurate as of 6/27/18 10:23 AM.  Always use your most recent med list.  
  
  
  
  
 aspirin delayed-release 81 mg tablet Take 81 mg by mouth daily. CIALIS 5 mg tablet Generic drug:  tadalafil Take 1 Tab by mouth daily as needed. diph,pertuss(acel),tetanus vac(PF) 2 Lf-(2.5-5-3-5 mcg)-5Lf/0.5 mL Syrg vaccine Commonly known as:  ADACEL  
0.5 mL by IntraMUSCular route once for 1 dose. ezetimibe-simvastatin 10-40 mg per tablet Commonly known as:  Vytorin 10/40 Take 1 Tab by mouth nightly. glimepiride 4 mg tablet Commonly known as:  AMARYL  
TAKE 1 TABLET BY MOUTH EVERY MORNING  
  
 glucose blood VI test strips strip Commonly known as:  ONETOUCH ULTRA TEST Check BS daily  
  
 hydrocortisone 2.5 % topical cream  
Commonly known as:  HYTONE  
APPLY A THIN LAYER TO THE AFFECTED AREA TWICE A DAY  
  
 lisinopril 20 mg tablet Commonly known as:  PRINIVIL, ZESTRIL  
TAKE 1 TABLET BY MOUTH DAILY  
  
 omega-3 fatty acids Cap Take 1,000 mg by mouth daily. pneumococcal 13 jimmie conj dip 0.5 mL Syrg injection Commonly known as:  PREVNAR-13  
0.5 mL by IntraMUSCular route PRIOR TO DISCHARGE for 1 dose. pneumococcal 23-valent 25 mcg/0.5 mL injection Commonly known as:  PNEUMOVAX 23 0.5 mL by IntraMUSCular route PRIOR TO DISCHARGE for 1 dose. SITagliptin-metFORMIN  mg per tablet Commonly known as:  Casselberry Cleaning Take 1 Tab by mouth two (2) times daily (with meals). Prescriptions Printed Refills diph,pertuss,acel,,tetanus vac,PF, (ADACEL) 2 Lf-(2.5-5-3-5 mcg)-5Lf/0.5 mL syrg vaccine 0 Si.5 mL by IntraMUSCular route once for 1 dose. Class: Print Route: IntraMUSCular  
 pneumococcal 13 jimmie conj dip (PREVNAR-13) 0.5 mL syrg injection 0 Si.5 mL by IntraMUSCular route PRIOR TO DISCHARGE for 1 dose. Class: Print Route: IntraMUSCular We Performed the Following CBC WITH AUTOMATED DIFF [07296 CPT(R)] CRP, HIGH SENSITIVITY [99826 CPT(R)] HEMOGLOBIN A1C WITH EAG [78930 CPT(R)] LIPID PANEL [75439 CPT(R)] METABOLIC PANEL, COMPREHENSIVE [29351 CPT(R)] MICROALBUMIN, UR, RAND W/ MICROALB/CREAT RATIO [id number] CPT(R)] PSA, DIAGNOSTIC (PROSTATE SPECIFIC AG) [id number] CPT(R)] REFERRAL TO GASTROENTEROLOGY [LOD02 Custom] Comments:  
 Screening for colon cancer. H/o prostate cancer and s/p proctectomy. REFERRAL TO OPHTHALMOLOGY [REF57 Custom] Comments:  
 Please evaluate patient for diabetic eye evaluation. Follow-up Instructions Return in about 6 months (around 2018), or if symptoms worsen or fail to improve. Referral Information Referral ID Referred By Referred To  
  
 [id number] Nina LEEmarbella [address] [place] [address] 21  [place], [address] [facility] Visits Status Start Date End Date 1 New Request 18 If your referral has a status of pending review or denied, additional information will be sent to support the outcome of this decision. Referral ID Referred By Referred To  
 [id number] Skinny Sampson [facility] [address] [place], [address] Visits Status Start Date End Date 1 New Request 6/27/18 6/27/19 If your referral has a status of pending review or denied, additional information will be sent to support the outcome of this decision. Patient Instructions Diabetes Foot Health: Care Instructions Your Care Instructions When you have diabetes, your feet need extra care and attention. Diabetes can damage the nerve endings and blood vessels in your feet, making you less likely to notice when your feet are injured. Diabetes also limits your body's ability to fight infection and get blood to areas that need it. If you get a minor foot injury, it could become an ulcer or a serious infection. With good foot care, you can prevent most of these problems. Caring for your feet can be quick and easy. Most of the care can be done when you are bathing or getting ready for bed. Follow-up care is a key part of your treatment and safety. Be sure to make and go to all appointments, and call your doctor if you are having problems. It's also a good idea to know your test results and keep a list of the medicines you take. How can you care for yourself at home? · Keep your blood sugar close to normal by watching what and how much you eat, monitoring blood sugar, taking medicines if prescribed, and getting regular exercise. · Do not smoke. Smoking affects blood flow and can make foot problems worse. If you need help quitting, talk to your doctor about stop-smoking programs and medicines. These can increase your chances of quitting for good. · Eat a diet that is low in fats. High fat intake can cause fat to build up in your blood vessels and decrease blood flow. · Inspect your feet daily for blisters, cuts, cracks, or sores. If you cannot see well, use a mirror or have someone help you. · Take care of your feet: 
[facility] AUTHORITY your feet every day. Use warm (not hot) water. Check the water temperature with your wrists or other part of your body, not your feet. ¨ Dry your feet well. Pat them dry. Do not rub the skin on your feet too hard. Dry well between your toes. If the skin on your feet stays moist, bacteria or a fungus can grow, which can lead to infection. ¨ Keep your skin soft. Use moisturizing skin cream to keep the skin on your feet soft and prevent calluses and cracks. But do not put the cream between your toes, and stop using any cream that causes a rash. ¨ Clean underneath your toenails carefully. Do not use a sharp object to clean underneath your toenails. Use the blunt end of a nail file or other rounded tool. ¨ Trim and file your toenails straight across to prevent ingrown toenails. Use a nail clipper, not scissors. Use an emery board to smooth the edges. · Change socks daily. Socks without seams are best, because seams often rub the feet. You can find socks for people with diabetes from specialty catalogs. · Look inside your shoes every day for things like gravel or torn linings, which could cause blisters or sores. · Buy shoes that fit well: 
¨ Look for shoes that have plenty of space around the toes. This helps prevent bunions and blisters. ¨ Try on shoes while wearing the kind of socks you will usually wear with the shoes. ¨ Avoid plastic shoes. They may rub your feet and cause blisters. Good shoes should be made of materials that are flexible and breathable, such as leather or cloth. ¨ Break in new shoes slowly by wearing them for no more than an hour a day for several days. Take extra time to check your feet for red areas, blisters, or other problems after you wear new shoes. · Do not go barefoot. Do not wear sandals, and do not wear shoes with very thin soles. Thin soles are easy to puncture. They also do not protect your feet from hot pavement or cold weather. · Have your doctor check your feet during each visit. If you have a foot problem, see your doctor.  Do not try to treat an early foot problem at home. Home remedies or treatments that you can buy without a prescription (such as corn removers) can be harmful. · Always get early treatment for foot problems. A minor irritation can lead to a major problem if not properly cared for early. When should you call for help? Call your doctor now or seek immediate medical care if: 
? · You have a foot sore, an ulcer or break in the skin that is not healing after 4 days, bleeding corns or calluses, or an ingrown toenail. ? · You have blue or black areas, which can mean bruising or blood flow problems. ? · You have peeling skin or tiny blisters between your toes or cracking or oozing of the skin. ? · You have a fever for more than 24 hours and a foot sore. ? · You have new numbness or tingling in your feet that does not go away after you move your feet or change positions. ? · You have unexplained or unusual swelling of the foot or ankle. ? Watch closely for changes in your health, and be sure to contact your doctor if: 
? · You cannot do proper foot care. Where can you learn more? Go to http://darrick-dyllan.info/. Enter A739 in the search box to learn more about \"Diabetes Foot Health: Care Instructions. \" Current as of: March 13, 2017 Content Version: 11.4 © 7342-9008 [employer]. Care instructions adapted under license by [employer] (which disclaims liability or warranty for this information). If you have questions about a medical condition or this instruction, always ask your healthcare professional. [place] [address] any warranty or liability for your use of this information. Introducing [facility]! Dear Zion Goss: Thank you for requesting a Supersonic account. Our records indicate that you already have an active Supersonic account. You can access your account anytime at [email or web address] Did you know that you can access your hospital and ER discharge instructions at any time in Sunnova? You can also review all of your test results from your hospital stay or ER visit. Additional Information If you have questions, please visit the Frequently Asked Questions section of the Sunnova website at [email or web address] Remember, Sunnova is NOT to be used for urgent needs. For medical emergencies, dial 911. Now available from your iPhone and Android! Please provide this summary of care documentation to your next provider. Your primary care clinician is listed as [address]. If you have any questions after today's visit, please call [phone].

## 2018-06-27 NOTE — PROGRESS NOTES
Identified pt with two pt identifiers(name and ). Chief Complaint   Patient presents with    Follow Up Chronic Condition     T2DM, Htn, HLD and labs         Health Maintenance Due   Topic    DTaP/Tdap/Td series (1 - Tdap)    EYE EXAM RETINAL OR DILATED Q1     COLONOSCOPY     GLAUCOMA SCREENING Q2Y     Pneumococcal 65+ High/Highest Risk (1 of 2 - PCV13)    MICROALBUMIN Q1     HEMOGLOBIN A1C Q6M        Wt Readings from Last 3 Encounters:   17 215 lb (97.5 kg)   17 223 lb (101.2 kg)   10/04/16 224 lb (101.6 kg)     Temp Readings from Last 3 Encounters:   17 98.2 °F (36.8 °C) (Oral)   17 98 °F (36.7 °C) (Temporal)   10/04/16 97.6 °F (36.4 °C) (Oral)     BP Readings from Last 3 Encounters:   17 165/88   17 124/72   10/04/16 120/72     Pulse Readings from Last 3 Encounters:   17 (!) 52   17 93   10/04/16 60         Learning Assessment:  :     Learning Assessment 2016   PRIMARY LEARNER Patient Patient   HIGHEST LEVEL OF EDUCATION - PRIMARY LEARNER  GRADUATED HIGH SCHOOL OR GED -   BARRIERS PRIMARY LEARNER NONE -   CO-LEARNER CAREGIVER No -   PRIMARY LANGUAGE ENGLISH ENGLISH   LEARNER PREFERENCE PRIMARY OTHER (COMMENT) DEMONSTRATION   ANSWERED BY self patient   RELATIONSHIP SELF SELF       Depression Screening:  :     PHQ over the last two weeks 2018   Little interest or pleasure in doing things Not at all   Feeling down, depressed or hopeless Not at all   Total Score PHQ 2 0       Fall Risk Assessment:  :     Fall Risk Assessment, last 12 mths 2018   Able to walk? Yes   Fall in past 12 months? No       Abuse Screening:  :     Abuse Screening Questionnaire 2017   Do you ever feel afraid of your partner? N N N   Are you in a relationship with someone who physically or mentally threatens you? N N N   Is it safe for you to go home?  Y Y Y       Coordination of Care Questionnaire:  :     1) Have you been to an emergency room, urgent care clinic since your last visit? no   Hospitalized since your last visit? no             2) Have you seen or consulted any other health care providers outside of [address] [place] [address] since your last visit? no  (Include any pap smears or colon screenings in this section.)    3) Do you have an Advance Directive on file? no  Are you interested in receiving information about Advance Directives? no    Reviewed record in preparation for visit and have obtained necessary documentation. Medication reconciliation up to date and corrected with patient at this time.

## 2018-06-28 LAB
ALBUMIN SERPL-MCNC: 4.7 G/DL (ref 3.6–4.8)
ALBUMIN/CREAT UR: 23.4 MG/G CREAT (ref 0–30)
ALBUMIN/GLOB SERPL: 1.9 {RATIO} (ref 1.2–2.2)
ALP SERPL-CCNC: 59 IU/L (ref 39–117)
ALT SERPL-CCNC: 19 IU/L (ref 0–44)
AST SERPL-CCNC: 17 IU/L (ref 0–40)
BASOPHILS # BLD AUTO: 0 X10E3/UL (ref 0–0.2)
BASOPHILS NFR BLD AUTO: 0 %
BILIRUB SERPL-MCNC: 0.5 MG/DL (ref 0–1.2)
BUN SERPL-MCNC: 13 MG/DL (ref 8–27)
BUN/CREAT SERPL: 15 (ref 10–24)
CALCIUM SERPL-MCNC: 9.9 MG/DL (ref 8.6–10.2)
CHLORIDE SERPL-SCNC: 103 MMOL/L (ref 96–106)
CHOLEST SERPL-MCNC: 163 MG/DL (ref 100–199)
CO2 SERPL-SCNC: 25 MMOL/L (ref 20–29)
CREAT SERPL-MCNC: 0.89 MG/DL (ref 0.76–1.27)
CREAT UR-MCNC: 202.8 MG/DL
CRP SERPL HS-MCNC: 0.57 MG/L (ref 0–3)
EOSINOPHIL # BLD AUTO: 0.1 X10E3/UL (ref 0–0.4)
EOSINOPHIL NFR BLD AUTO: 1 %
ERYTHROCYTE [DISTWIDTH] IN BLOOD BY AUTOMATED COUNT: 13.4 % (ref 12.3–15.4)
EST. AVERAGE GLUCOSE BLD GHB EST-MCNC: 260 MG/DL
GLOBULIN SER CALC-MCNC: 2.5 G/DL (ref 1.5–4.5)
GLUCOSE SERPL-MCNC: 214 MG/DL (ref 65–99)
HBA1C MFR BLD: 10.7 % (ref 4.8–5.6)
HCT VFR BLD AUTO: 40.5 % (ref 37.5–51)
HDLC SERPL-MCNC: 53 MG/DL
HGB BLD-MCNC: 12.9 G/DL (ref 13–17.7)
IMM GRANULOCYTES # BLD: 0 X10E3/UL (ref 0–0.1)
IMM GRANULOCYTES NFR BLD: 0 %
INTERPRETATION, 910389: NORMAL
LDLC SERPL CALC-MCNC: 98 MG/DL (ref 0–99)
LYMPHOCYTES # BLD AUTO: 2.4 X10E3/UL (ref 0.7–3.1)
LYMPHOCYTES NFR BLD AUTO: 37 %
Lab: NORMAL
MCH RBC QN AUTO: 31.5 PG (ref 26.6–33)
MCHC RBC AUTO-ENTMCNC: 31.9 G/DL (ref 31.5–35.7)
MCV RBC AUTO: 99 FL (ref 79–97)
MICROALBUMIN UR-MCNC: 47.4 UG/ML
MONOCYTES # BLD AUTO: 0.4 X10E3/UL (ref 0.1–0.9)
MONOCYTES NFR BLD AUTO: 7 %
NEUTROPHILS # BLD AUTO: 3.5 X10E3/UL (ref 1.4–7)
NEUTROPHILS NFR BLD AUTO: 55 %
PLATELET # BLD AUTO: 293 X10E3/UL (ref 150–379)
POTASSIUM SERPL-SCNC: 5.3 MMOL/L (ref 3.5–5.2)
PROT SERPL-MCNC: 7.2 G/DL (ref 6–8.5)
PSA SERPL-MCNC: <0.1 NG/ML (ref 0–4)
RBC # BLD AUTO: 4.1 X10E6/UL (ref 4.14–5.8)
SODIUM SERPL-SCNC: 141 MMOL/L (ref 134–144)
TRIGL SERPL-MCNC: 61 MG/DL (ref 0–149)
VLDLC SERPL CALC-MCNC: 12 MG/DL (ref 5–40)
WBC # BLD AUTO: 6.4 X10E3/UL (ref 3.4–10.8)

## 2018-07-12 ENCOUNTER — OFFICE VISIT (OUTPATIENT)
Dept: FAMILY MEDICINE CLINIC | Age: 68
End: 2018-07-12

## 2018-07-12 VITALS
WEIGHT: 222.6 LBS | OXYGEN SATURATION: 98 % | HEART RATE: 57 BPM | HEIGHT: 73 IN | RESPIRATION RATE: 20 BRPM | SYSTOLIC BLOOD PRESSURE: 138 MMHG | BODY MASS INDEX: 29.5 KG/M2 | TEMPERATURE: 98.3 F | DIASTOLIC BLOOD PRESSURE: 77 MMHG

## 2018-07-12 DIAGNOSIS — Z00.00 MEDICARE ANNUAL WELLNESS VISIT, SUBSEQUENT: Primary | ICD-10-CM

## 2018-07-12 DIAGNOSIS — E78.2 MIXED HYPERLIPIDEMIA: ICD-10-CM

## 2018-07-12 DIAGNOSIS — E11.9 TYPE 2 DIABETES MELLITUS WITHOUT COMPLICATION, WITHOUT LONG-TERM CURRENT USE OF INSULIN (HCC): ICD-10-CM

## 2018-07-12 DIAGNOSIS — E55.9 VITAMIN D DEFICIENCY: ICD-10-CM

## 2018-07-12 NOTE — PATIENT INSTRUCTIONS

## 2018-07-12 NOTE — PROGRESS NOTES
Identified pt with two pt identifiers(name and ). No chief complaint on file. Health Maintenance Due   Topic    DTaP/Tdap/Td series (1 - Tdap)    EYE EXAM RETINAL OR DILATED Q1     COLONOSCOPY     GLAUCOMA SCREENING Q2Y     Pneumococcal 65+ High/Highest Risk (1 of 2 - PCV13)       Wt Readings from Last 3 Encounters:   18 218 lb 12.8 oz (99.2 kg)   17 215 lb (97.5 kg)   17 223 lb (101.2 kg)     Temp Readings from Last 3 Encounters:   18 98.3 °F (36.8 °C) (Oral)   17 98.2 °F (36.8 °C) (Oral)   17 98 °F (36.7 °C) (Temporal)     BP Readings from Last 3 Encounters:   18 120/68   17 165/88   17 124/72     Pulse Readings from Last 3 Encounters:   18 (!) 49   17 (!) 52   17 93         Learning Assessment:  :     Learning Assessment 2016   PRIMARY LEARNER Patient Patient   HIGHEST LEVEL OF EDUCATION - PRIMARY LEARNER  GRADUATED HIGH SCHOOL OR GED -   BARRIERS PRIMARY LEARNER NONE -   Bianca Arnold CAREGIVER No -   PRIMARY LANGUAGE ENGLISH ENGLISH   LEARNER PREFERENCE PRIMARY OTHER (COMMENT) DEMONSTRATION   ANSWERED BY self patient   RELATIONSHIP SELF SELF       Depression Screening:  :     PHQ over the last two weeks 2018   Little interest or pleasure in doing things Not at all   Feeling down, depressed or hopeless Not at all   Total Score PHQ 2 0       Fall Risk Assessment:  :     Fall Risk Assessment, last 12 mths 2018   Able to walk? Yes   Fall in past 12 months? No       Abuse Screening:  :     Abuse Screening Questionnaire 2017   Do you ever feel afraid of your partner? N N N   Are you in a relationship with someone who physically or mentally threatens you? N N N   Is it safe for you to go home?  Y Y Y       Coordination of Care Questionnaire:  :     1) Have you been to an emergency room, urgent care clinic since your last visit? no   Hospitalized since your last visit? no             2) Have you seen or consulted any other health care providers outside of [address] [place] [address] since your last visit? no  (Include any pap smears or colon screenings in this section.)    3) Do you have an Advance Directive on file? no  Are you interested in receiving information about Advance Directives? no.    Reviewed record in preparation for visit and have obtained necessary documentation. Medication reconciliation up to date and corrected with patient at this time.

## 2018-07-12 NOTE — MR AVS SNAPSHOT
[address] [facility] 
 
 
 [address] [facility] 
[phone] Patient: Lars Cowden MRN:  Rosemary Samuel Visit Information Date & Time Provider Department Dept. Phone Encounter #  
 7/12/2018 10:00 AM Rachid Hugo Mihai [phone] [id number] Follow-up Instructions Return if symptoms worsen or fail to improve. Upcoming Health Maintenance Date Due DTaP/Tdap/Td series (1 - Tdap) 8/31/1973 EYE EXAM RETINAL OR DILATED Q1 6/12/2014 COLONOSCOPY 9/12/2014 GLAUCOMA SCREENING Q2Y 8/31/2017 Pneumococcal 65+ High/Highest Risk (1 of 2 - PCV13) 8/31/2017 Influenza Age 5 to Adult 8/1/2018 FOOT EXAM Q1 12/6/2018 HEMOGLOBIN A1C Q6M 12/27/2018 MICROALBUMIN Q1 6/27/2019 LIPID PANEL Q1 6/27/2019 Allergies as of 7/12/2018  Review Complete On: 7/12/2018 By: Ron Rasmussen LPN No Known Allergies Current Immunizations  Reviewed on 7/12/2018 No immunizations on file. Reviewed by Carolina Farmer MD on 7/12/2018 at 10:48 AM  
You Were Diagnosed With   
  
 Codes Comments Medicare annual wellness visit, subsequent    -  Primary ICD-10-CM: Z00.00 ICD-9-CM: V70.0 Type 2 diabetes mellitus without complication, without long-term current use of insulin (HCC)     ICD-10-CM: E11.9 ICD-9-CM: 250.00 Mixed hyperlipidemia     ICD-10-CM: E78.2 ICD-9-CM: 272.2 Vitamin D deficiency     ICD-10-CM: E55.9 ICD-9-CM: 268.9 Vitals BP Pulse Temp Resp Height(growth percentile) Weight(growth percentile) 138/77 (BP 1 Location: Left arm, BP Patient Position: Sitting) (!) 57 98.3 °F (36.8 °C) (Oral) 20 6' 1\" (1.854 m) 222 lb 9.6 oz (101 kg) SpO2 BMI Smoking Status 98% 29.37 kg/m2 Never Smoker BMI and BSA Data Body Mass Index Body Surface Area  
 29.37 kg/m 2 2.28 m 2 Preferred Pharmacy Pharmacy Name Phone [id number] [address] ([address]) [phone] Your Updated Medication List  
  
   
This list is accurate as of 7/12/18 11:07 AM.  Always use your most recent med list.  
  
  
  
  
 aspirin delayed-release 81 mg tablet Take 81 mg by mouth daily. CIALIS 5 mg tablet Generic drug:  tadalafil Take 1 Tab by mouth daily as needed. diph,pertuss(acel),tetanus vac(PF) 2 Lf-(2.5-5-3-5 mcg)-5Lf/0.5 mL Syrg vaccine Commonly known as:  ADACEL  
0.5 mL by IntraMUSCular route once for 1 dose. ezetimibe-simvastatin 10-40 mg per tablet Commonly known as:  Vytorin 10/40 Take 1 Tab by mouth nightly. glimepiride 4 mg tablet Commonly known as:  AMARYL  
TAKE 1 TABLET BY MOUTH EVERY MORNING  
  
 glucose blood VI test strips strip Commonly known as:  ONETOUCH ULTRA TEST Check BS daily  
  
 hydrocortisone 2.5 % topical cream  
Commonly known as:  HYTONE  
APPLY A THIN LAYER TO THE AFFECTED AREA TWICE A DAY  
  
 lisinopril 20 mg tablet Commonly known as:  PRINIVIL, ZESTRIL  
TAKE 1 TABLET BY MOUTH DAILY  
  
 omega-3 fatty acids Cap Take 1,000 mg by mouth daily. * pneumococcal 13 jimmie conj dip 0.5 mL Syrg injection Commonly known as:  PREVNAR-13  
0.5 mL by IntraMUSCular route PRIOR TO DISCHARGE for 1 dose. * pneumococcal 13 jimmie conj dip 0.5 mL Syrg injection Commonly known as:  PREVNAR-13  
0.5 mL by IntraMUSCular route once for 1 dose. pneumococcal 23-valent 25 mcg/0.5 mL injection Commonly known as:  PNEUMOVAX 23  
0.5 mL by IntraMUSCular route PRIOR TO DISCHARGE for 1 dose. SITagliptin-metFORMIN  mg per tablet Commonly known as:  Hulan Freddy Take 1 Tab by mouth two (2) times daily (with meals). * Notice: This list has 2 medication(s) that are the same as other medications prescribed for you.  Read the directions carefully, and ask your doctor or other care provider to review them with you. Prescriptions Printed Refills diph,pertuss,acel,,tetanus vac,PF, (ADACEL) 2 Lf-(2.5-5-3-5 mcg)-5Lf/0.5 mL syrg vaccine 0 Si.5 mL by IntraMUSCular route once for 1 dose. Class: Print Route: IntraMUSCular  
 pneumococcal 13 jimmie conj dip (PREVNAR-13) 0.5 mL syrg injection 0 Si.5 mL by IntraMUSCular route once for 1 dose. Class: Print Route: IntraMUSCular We Performed the Following AMB POC EKG ROUTINE W LEADS, INTER & REP [79477 CPT(R)] Follow-up Instructions Return if symptoms worsen or fail to improve. Patient Instructions Well Visit, Over 72: Care Instructions Your Care Instructions Physical exams can help you stay healthy. Your doctor has checked your overall health and may have suggested ways to take good care of yourself. He or she also may have recommended tests. At home, you can help prevent illness with healthy eating, regular exercise, and other steps. Follow-up care is a key part of your treatment and safety. Be sure to make and go to all appointments, and call your doctor if you are having problems. It's also a good idea to know your test results and keep a list of the medicines you take. How can you care for yourself at home? · Reach and stay at a healthy weight. This will lower your risk for many problems, such as obesity, diabetes, heart disease, and high blood pressure. · Get at least 30 minutes of exercise on most days of the week. Walking is a good choice. You also may want to do other activities, such as running, swimming, cycling, or playing tennis or team sports. · Do not smoke. Smoking can make health problems worse. If you need help quitting, talk to your doctor about stop-smoking programs and medicines. These can increase your chances of quitting for good. · Protect your skin from too much sun. When you're outdoors from 10 a.m. to 4 p.m., stay in the shade or cover up with clothing and a hat with a wide brim. Wear sunglasses that block UV rays. Even when it's cloudy, put broad-spectrum sunscreen (SPF 30 or higher) on any exposed skin. · See a dentist one or two times a year for checkups and to have your teeth cleaned. · Wear a seat belt in the car. · Limit alcohol to 2 drinks a day for men and 1 drink a day for women. Too much alcohol can cause health problems. Follow your doctor's advice about when to have certain tests. These tests can spot problems early. For men and women · Cholesterol. Your doctor will tell you how often to have this done based on your overall health and other things that can increase your risk for heart attack and stroke. · Blood pressure. Have your blood pressure checked during a routine doctor visit. Your doctor will tell you how often to check your blood pressure based on your age, your blood pressure results, and other factors. · Diabetes. Ask your doctor whether you should have tests for diabetes. · Vision. Experts recommend that you have yearly exams for glaucoma and other age-related eye problems. · Hearing. Tell your doctor if you notice any change in your hearing. You can have tests to find out how well you hear. · Colon cancer tests. Keep having colon cancer tests as your doctor recommends. You can have one of several types of tests. · Heart attack and stroke risk. At least every 4 to 6 years, you should have your risk for heart attack and stroke assessed. Your doctor uses factors such as your age, blood pressure, cholesterol, and whether you smoke or have diabetes to show what your risk for a heart attack or stroke is over the next 10 years. · Osteoporosis. Talk to your doctor about whether you should have a bone density test to find out whether you have thinning bones. Also ask your doctor about whether you should take calcium and vitamin D supplements. For women · Pap test and pelvic exam. You may no longer need a Pap test. Talk with your doctor about whether to stop or continue to have Pap tests. · Breast exam and mammogram. Ask how often you should have a mammogram, which is an X-ray of your breasts. A mammogram can spot breast cancer before it can be felt and when it is easiest to treat. · Thyroid disease. Talk to your doctor about whether to have your thyroid checked as part of a regular physical exam. Women have an increased chance of a thyroid problem. For men · Prostate exam. Talk to your doctor about whether you should have a blood test (called a PSA test) for prostate cancer. Experts disagree on whether men should have this test. Some experts recommend that you discuss the benefits and risks of the test with your doctor. · Abdominal aortic aneurysm. Ask your doctor whether you should have a test to check for an aneurysm. You may need a test if you ever smoked or if your parent, brother, sister, or child has had an aneurysm. When should you call for help? Watch closely for changes in your health, and be sure to contact your doctor if you have any problems or symptoms that concern you. Where can you learn more? Go to http://darrick-dyllan.info/. Enter D408 in the search box to learn more about \"Well Visit, Over 65: Care Instructions. \" Current as of: May 16, 2017 Content Version: 11.7 © 5822-3679 [employer]. Care instructions adapted under license by [employer] (which disclaims liability or warranty for this information). If you have questions about a medical condition or this instruction, always ask your healthcare professional. [place] [address] any warranty or liability for your use of this information. Introducing [facility]! Dear Mateo Pinon: Thank you for requesting a [employer] account. Our records indicate that you already have an active [employer] account.   You can access your account anytime at [email or web address]. [email or web address] Did you know that you can access your hospital and ER discharge instructions at any time in [employer]? You can also review all of your test results from your hospital stay or ER visit. Additional Information If you have questions, please visit the Frequently Asked Questions section of the [employer] website at [email or web address] ([email or web address] Remember, [employer] is NOT to be used for urgent needs. For medical emergencies, dial 911. Now available from your iPhone and Android! Please provide this summary of care documentation to your next provider. Your primary care clinician is listed as [address]. If you have any questions after today's visit, please call [phone].

## 2018-07-12 NOTE — PROGRESS NOTES
This is a \"Welcome to [employer]"  Initial Preventive Physical Examination (IPPE) providing Personalized Prevention Plan Services (Performed in the first 12 months of enrollment)    I have reviewed the patient's medical history in detail and updated the computerized patient record. History   67M with Type II DM, HTN and h/o prostate cancer with HLD who presents for his welcome to Medicare wellness visit. He was here for routine follow up just last week. Fasting labs were obtained. His PSA <0.1. However, his HGBA1C was noted to be 10.7. This is the highest it has been. The patient tells me that he was without his medications for several weeks due to being in the donut hole. He is now back on his medications and would like to see if these improve the numbers. I have also asked him to recheck his BS daily. He has a glucometer and may need strips. Past Medical History:   Diagnosis Date    Colon polyps     Contact dermatitis and other eczema, due to unspecified cause     DM type 2 (diabetes mellitus, type 2) ([facility] [address] 6/10/2009    Essential hypertension, benign     Hypertension     Malignant neoplasm of prostate ([facility] [address] 9/25/2012    Mixed hyperlipidemia 6/10/2009    Mixed hyperlipidemia 6/10/2009    Prostate cancer ([facility] [address] 11/2009    resection prostate      Past Surgical History:   Procedure Laterality Date    HX PROSTATECTOMY       Current Outpatient Prescriptions   Medication Sig Dispense Refill    diph,pertuss,acel,,tetanus vac,PF, (ADACEL) 2 Lf-(2.5-5-3-5 mcg)-5Lf/0.5 mL syrg vaccine 0.5 mL by IntraMUSCular route once for 1 dose. 0.5 mL 0    pneumococcal 13 jimmie conj dip (PREVNAR-13) 0.5 mL syrg injection 0.5 mL by IntraMUSCular route once for 1 dose.  0.5 mL 0    lisinopril (PRINIVIL, ZESTRIL) 20 mg tablet TAKE 1 TABLET BY MOUTH DAILY 90 Tab 1    glimepiride (AMARYL) 4 mg tablet TAKE 1 TABLET BY MOUTH EVERY MORNING 90 Tab 1    hydrocortisone (HYTONE) 2.5 % topical cream APPLY A THIN LAYER TO THE AFFECTED AREA TWICE A DAY 28.35 g 2    CIALIS 5 mg tablet Take 1 Tab by mouth daily as needed. 24 Tab 1    SITagliptin-metFORMIN (JANUMET)  mg per tablet Take 1 Tab by mouth two (2) times daily (with meals). 180 Tab 1    ezetimibe-simvastatin (VYTORIN 10/40) 10-40 mg per tablet Take 1 Tab by mouth nightly. 90 Tab 1    glucose blood VI test strips (ONETOUCH ULTRA TEST) strip Check BS daily 100 Strip 3    aspirin delayed-release 81 mg tablet Take 81 mg by mouth daily.  omega-3 fatty acids cap Take 1,000 mg by mouth daily.  pneumococcal 13 jimmie conj dip (PREVNAR-13) 0.5 mL syrg injection 0.5 mL by IntraMUSCular route PRIOR TO DISCHARGE for 1 dose. 0.5 mL 0    pneumococcal 23-valent (PNEUMOVAX 23) 25 mcg/0.5 mL injection 0.5 mL by IntraMUSCular route PRIOR TO DISCHARGE for 1 dose. 0.5 mL 0     No Known Allergies  Family History   Problem Relation Age of Onset    Diabetes Mother     Hypertension Mother     Diabetes Father      Social History   Substance Use Topics    Smoking status: Never Smoker    Smokeless tobacco: Never Used    Alcohol use No     Diet, Lifestyle: The patient is prescribed and follows a special diet. Exercise level: moderately active    Depression Risk Screen     PHQ over the last two weeks 7/12/2018   Little interest or pleasure in doing things Not at all   Feeling down, depressed or hopeless Not at all   Total Score PHQ 2 0     Alcohol Risk Screen   You do not drink alcohol or very rarely. Functional Ability and Level of Safety   Hearing Loss  Hearing is good. Vision Screening  Vision is good. No exam data present      Activities of Daily Living  The home contains: no safety equipment. Patient does total self care    Fall Risk Screen  Fall Risk Assessment, last 12 mths 7/12/2018   Able to walk? Yes   Fall in past 12 months?  No       Abuse Screen  Patient is not abused    Screening EKG   EKG order placed: Yes    Patient Care Team   Patient Care Team:  Susi White Slick Macario MD as PCP - General (Pediatrics)     End of Life Planning   Advanced care planning directives were discussed with the patient and /or family/caregiver. Assessment/Plan   Education and counseling provided:  Are appropriate based on today's review and evaluation  Pneumococcal Vaccine  Prostate cancer screening tests (PSA, covered annually)  Colorectal cancer screening tests  Screening for glaucoma  Diabetes screening test    Diagnoses and all orders for this visit:    1. Medicare annual wellness visit, subsequent    2. Type 2 diabetes mellitus without complication, without long-term current use of insulin ([address]    3. Mixed hyperlipidemia    4. Vitamin D deficiency    Other orders  -     diph,pertuss,acel,,tetanus vac,PF, (ADACEL) 2 Lf-(2.5-5-3-5 mcg)-5Lf/0.5 mL syrg vaccine; 0.5 mL by IntraMUSCular route once for 1 dose. -     pneumococcal 13 jimmie conj dip (PREVNAR-13) 0.5 mL syrg injection; 0.5 mL by IntraMUSCular route once for 1 dose. Health Maintenance Due   Topic Date Due    DTaP/Tdap/Td series (1 - Tdap) Discussed    EYE EXAM RETINAL OR DILATED Q1  Discussed    COLONOSCOPY  Discussed referral given    GLAUCOMA SCREENING Q2Y  Discussed    Pneumococcal 65+ High/Highest Risk (1 of 2 - PCV13) Discussed       ICD-10-CM ICD-9-CM    1. Medicare annual wellness visit, subsequent Z00.00 V70.0 Anticipatory guidance discussed. Immunizations reviewed  HM updated. .      AMB POC EKG ROUTINE W/ 12 LEADS, INTER & REP   2. Type 2 diabetes mellitus without complication, without long-term current use of insulin (HCC) E11.9 250.00 AMB POC EKG ROUTINE W/ 12 LEADS, INTER & REP   3. Mixed hyperlipidemia E78.2 272.2 AMB POC EKG ROUTINE W/ 12 LEADS, INTER & REP   4. Vitamin D deficiency E55.9 268.9      I have discussed the diagnosis with the patient and the intended treatment plan as seen in the above orders. The patient has received an after-visit summary and questions were answered concerning future plans. Asked to return should symptoms worsen or not improve with treatment. Any pending labs and studies will be relayed to patient when they become available. Pt verbalizes understanding of plan of care and denies further questions or concerns at this time. Follow-up Disposition:  Return if symptoms worsen or fail to improve. Return in 1-year for AWV. Every 4-6 months for chronic medical problems. Ludin Fountain

## 2018-08-16 RX ORDER — GLIMEPIRIDE 4 MG/1
TABLET ORAL
Qty: 90 TAB | Refills: 1 | Status: SHIPPED | OUTPATIENT
Start: 2018-08-16 | End: 2018-12-20 | Stop reason: SDUPTHER

## 2018-10-18 RX ORDER — HYDROCORTISONE 25 MG/G
CREAM TOPICAL
Qty: 28.35 G | Refills: 2 | Status: SHIPPED | OUTPATIENT
Start: 2018-10-18 | End: 2018-12-20 | Stop reason: SDUPTHER

## 2018-11-29 RX ORDER — SITAGLIPTIN AND METFORMIN HYDROCHLORIDE 500; 50 MG/1; MG/1
TABLET, FILM COATED ORAL
Qty: 180 TAB | Refills: 0 | Status: SHIPPED | OUTPATIENT
Start: 2018-11-29 | End: 2019-02-25 | Stop reason: SDUPTHER

## 2018-12-14 DIAGNOSIS — I10 ESSENTIAL HYPERTENSION WITH GOAL BLOOD PRESSURE LESS THAN 140/90: ICD-10-CM

## 2018-12-14 RX ORDER — LISINOPRIL 20 MG/1
TABLET ORAL
Qty: 90 TAB | Refills: 1 | Status: SHIPPED | OUTPATIENT
Start: 2018-12-14 | End: 2019-11-13 | Stop reason: SDUPTHER

## 2018-12-14 RX ORDER — LISINOPRIL 20 MG/1
TABLET ORAL
Qty: 90 TAB | Refills: 1 | Status: SHIPPED | OUTPATIENT
Start: 2018-12-14 | End: 2018-12-14 | Stop reason: SDUPTHER

## 2018-12-14 NOTE — TELEPHONE ENCOUNTER
----- Message from Ren Sherman sent at 12/14/2018 10:57 AM EST -----  Regarding: Dr. Daija Frye  Pt is requesting a refill on medication Lisinopril 20 mg (Pharmacy has notified Pt that medication needs authorization)  Pharmacy [facility] (on file) Best Contact [phone]

## 2018-12-20 ENCOUNTER — HOSPITAL ENCOUNTER (OUTPATIENT)
Dept: LAB | Age: 68
Discharge: HOME | End: 2018-12-20
Payer: MEDICARE

## 2018-12-20 ENCOUNTER — OFFICE VISIT (OUTPATIENT)
Dept: FAMILY MEDICINE CLINIC | Age: 68
End: 2018-12-20

## 2018-12-20 VITALS
TEMPERATURE: 98.3 F | BODY MASS INDEX: 28.97 KG/M2 | HEIGHT: 73 IN | WEIGHT: 218.6 LBS | RESPIRATION RATE: 20 BRPM | OXYGEN SATURATION: 95 % | HEART RATE: 83 BPM | SYSTOLIC BLOOD PRESSURE: 150 MMHG | DIASTOLIC BLOOD PRESSURE: 80 MMHG

## 2018-12-20 DIAGNOSIS — E11.9 ENCOUNTER FOR DIABETIC FOOT EXAM (HCC): ICD-10-CM

## 2018-12-20 DIAGNOSIS — E55.9 VITAMIN D DEFICIENCY: ICD-10-CM

## 2018-12-20 DIAGNOSIS — C61 MALIGNANT NEOPLASM OF PROSTATE (HCC): ICD-10-CM

## 2018-12-20 DIAGNOSIS — E78.2 MIXED HYPERLIPIDEMIA: ICD-10-CM

## 2018-12-20 DIAGNOSIS — E11.9 TYPE 2 DIABETES MELLITUS WITHOUT COMPLICATION, WITHOUT LONG-TERM CURRENT USE OF INSULIN (HCC): Primary | ICD-10-CM

## 2018-12-20 DIAGNOSIS — Z23 ENCOUNTER FOR IMMUNIZATION: ICD-10-CM

## 2018-12-20 PROCEDURE — 36415 COLL VENOUS BLD VENIPUNCTURE: CPT

## 2018-12-20 PROCEDURE — 80053 COMPREHEN METABOLIC PANEL: CPT

## 2018-12-20 PROCEDURE — 86141 C-REACTIVE PROTEIN HS: CPT

## 2018-12-20 PROCEDURE — 85025 COMPLETE CBC W/AUTO DIFF WBC: CPT

## 2018-12-20 PROCEDURE — 80061 LIPID PANEL: CPT

## 2018-12-20 PROCEDURE — 83036 HEMOGLOBIN GLYCOSYLATED A1C: CPT

## 2018-12-20 PROCEDURE — 82043 UR ALBUMIN QUANTITATIVE: CPT

## 2018-12-20 PROCEDURE — 82306 VITAMIN D 25 HYDROXY: CPT

## 2018-12-20 RX ORDER — HYDROCORTISONE 25 MG/G
CREAM TOPICAL 2 TIMES DAILY
Qty: 28.35 G | Refills: 2 | Status: SHIPPED | OUTPATIENT
Start: 2018-12-20 | End: 2019-09-11 | Stop reason: SDUPTHER

## 2018-12-20 RX ORDER — GLIMEPIRIDE 4 MG/1
TABLET ORAL
Qty: 90 TAB | Refills: 1 | Status: SHIPPED | OUTPATIENT
Start: 2018-12-20 | End: 2019-05-09 | Stop reason: SDUPTHER

## 2018-12-20 NOTE — PROGRESS NOTES
Identified pt with two pt identifiers(name and ). Chief Complaint   Patient presents with    Follow Up Chronic Condition      6 mo check up and fasting labs        Health Maintenance Due   Topic    DTaP/Tdap/Td series (1 - Tdap)    Shingrix Vaccine Age 50> (1 of 2)    COLONOSCOPY     EYE EXAM RETINAL OR DILATED     GLAUCOMA SCREENING Q2Y     Pneumococcal 65+ High/Highest Risk (1 of 2 - PCV13)    Influenza Age 5 to Adult     FOOT EXAM Q1     HEMOGLOBIN A1C Q6M        Wt Readings from Last 3 Encounters:   18 222 lb 9.6 oz (101 kg)   18 218 lb 12.8 oz (99.2 kg)   17 215 lb (97.5 kg)     Temp Readings from Last 3 Encounters:   18 98.3 °F (36.8 °C) (Oral)   18 98.3 °F (36.8 °C) (Oral)   17 98.2 °F (36.8 °C) (Oral)     BP Readings from Last 3 Encounters:   18 138/77   18 120/68   17 165/88     Pulse Readings from Last 3 Encounters:   18 (!) 57   18 (!) 49   17 (!) 52         Learning Assessment:  :     Learning Assessment 2016   PRIMARY LEARNER Patient Patient   HIGHEST LEVEL OF EDUCATION - PRIMARY LEARNER  GRADUATED HIGH SCHOOL OR GED -   BARRIERS PRIMARY LEARNER NONE -   CO-LEARNER CAREGIVER No -   PRIMARY LANGUAGE ENGLISH ENGLISH   LEARNER PREFERENCE PRIMARY OTHER (COMMENT) DEMONSTRATION   ANSWERED BY self patient   RELATIONSHIP SELF SELF       Depression Screening:  :     PHQ over the last two weeks 2018   Little interest or pleasure in doing things Not at all   Feeling down, depressed, irritable, or hopeless Not at all   Total Score PHQ 2 0       Fall Risk Assessment:  :     Fall Risk Assessment, last 12 mths 2018   Able to walk? Yes   Fall in past 12 months? No       Abuse Screening:  :     Abuse Screening Questionnaire 2017   Do you ever feel afraid of your partner? N N N   Are you in a relationship with someone who physically or mentally threatens you?  N N N   Is it safe for you to go home? Y Y Y       Coordination of Care Questionnaire:  :     1) Have you been to an emergency room, urgent care clinic since your last visit? no   Hospitalized since your last visit? no             2) Have you seen or consulted any other health care providers outside of [address] [place] [address] since your last visit? no  (Include any pap smears or colon screenings in this section.)    3) Do you have an Advance Directive on file? no  Are you interested in receiving information about Advance Directives? no    Reviewed record in preparation for visit and have obtained necessary documentation. Medication reconciliation up to date and corrected with patient at this time.

## 2018-12-20 NOTE — PATIENT INSTRUCTIONS
Diabetes Foot Health: Care Instructions  Your Care Instructions    When you have diabetes, your feet need extra care and attention. Diabetes can damage the nerve endings and blood vessels in your feet, making you less likely to notice when your feet are injured. Diabetes also limits your body's ability to fight infection and get blood to areas that need it. If you get a minor foot injury, it could become an ulcer or a serious infection. With good foot care, you can prevent most of these problems. Caring for your feet can be quick and easy. Most of the care can be done when you are bathing or getting ready for bed. Follow-up care is a key part of your treatment and safety. Be sure to make and go to all appointments, and call your doctor if you are having problems. It's also a good idea to know your test results and keep a list of the medicines you take. How can you care for yourself at home? · Keep your blood sugar close to normal by watching what and how much you eat, monitoring blood sugar, taking medicines if prescribed, and getting regular exercise. · Do not smoke. Smoking affects blood flow and can make foot problems worse. If you need help quitting, talk to your doctor about stop-smoking programs and medicines. These can increase your chances of quitting for good. · Eat a diet that is low in fats. High fat intake can cause fat to build up in your blood vessels and decrease blood flow. · Inspect your feet daily for blisters, cuts, cracks, or sores. If you cannot see well, use a mirror or have someone help you. · Take care of your feet:  ? Wash your feet every day. Use warm (not hot) water. Check the water temperature with your wrists or other part of your body, not your feet. ? Dry your feet well. Pat them dry. Do not rub the skin on your feet too hard. Dry well between your toes. If the skin on your feet stays moist, bacteria or a fungus can grow, which can lead to infection. ?  Keep your skin soft. Use moisturizing skin cream to keep the skin on your feet soft and prevent calluses and cracks. But do not put the cream between your toes, and stop using any cream that causes a rash. ? Clean underneath your toenails carefully. Do not use a sharp object to clean underneath your toenails. Use the blunt end of a nail file or other rounded tool. ? Trim and file your toenails straight across to prevent ingrown toenails. Use a nail clipper, not scissors. Use an emery board to smooth the edges. · Change socks daily. Socks without seams are best, because seams often rub the feet. You can find socks for people with diabetes from specialty catalogs. · Look inside your shoes every day for things like gravel or torn linings, which could cause blisters or sores. · Buy shoes that fit well:  ? Look for shoes that have plenty of space around the toes. This helps prevent bunions and blisters. ? Try on shoes while wearing the kind of socks you will usually wear with the shoes. ? Avoid plastic shoes. They may rub your feet and cause blisters. Good shoes should be made of materials that are flexible and breathable, such as leather or cloth. ? Break in new shoes slowly by wearing them for no more than an hour a day for several days. Take extra time to check your feet for red areas, blisters, or other problems after you wear new shoes. · Do not go barefoot. Do not wear sandals, and do not wear shoes with very thin soles. Thin soles are easy to puncture. They also do not protect your feet from hot pavement or cold weather. · Have your doctor check your feet during each visit. If you have a foot problem, see your doctor. Do not try to treat an early foot problem at home. Home remedies or treatments that you can buy without a prescription (such as corn removers) can be harmful. · Always get early treatment for foot problems. A minor irritation can lead to a major problem if not properly cared for early.   When should you call for help? Call your doctor now or seek immediate medical care if:    · You have a foot sore, an ulcer or break in the skin that is not healing after 4 days, bleeding corns or calluses, or an ingrown toenail.     · You have blue or black areas, which can mean bruising or blood flow problems.     · You have peeling skin or tiny blisters between your toes or cracking or oozing of the skin.     · You have a fever for more than 24 hours and a foot sore.     · You have new numbness or tingling in your feet that does not go away after you move your feet or change positions.     · You have unexplained or unusual swelling of the foot or ankle.    Watch closely for changes in your health, and be sure to contact your doctor if:    · You cannot do proper foot care. Where can you learn more? Go to http://darrick-dyllan.info/. Enter A739 in the search box to learn more about \"Diabetes Foot Health: Care Instructions. \"  Current as of: December 7, 2017  Content Version: 11.8  © 6097-1476 [employer]. Care instructions adapted under license by [employer] (which disclaims liability or warranty for this information). If you have questions about a medical condition or this instruction, always ask your healthcare professional. [address] any warranty or liability for your use of this information.

## 2018-12-21 LAB
25(OH)D3+25(OH)D2 SERPL-MCNC: 32.5 NG/ML (ref 30–100)
ALBUMIN SERPL-MCNC: 4.9 G/DL (ref 3.6–4.8)
ALBUMIN/CREAT UR: 16.7 MG/G CREAT (ref 0–30)
ALBUMIN/GLOB SERPL: 2 {RATIO} (ref 1.2–2.2)
ALP SERPL-CCNC: 45 IU/L (ref 39–117)
ALT SERPL-CCNC: 19 IU/L (ref 0–44)
AST SERPL-CCNC: 20 IU/L (ref 0–40)
BASOPHILS # BLD AUTO: 0 X10E3/UL (ref 0–0.2)
BASOPHILS NFR BLD AUTO: 0 %
BILIRUB SERPL-MCNC: 0.4 MG/DL (ref 0–1.2)
BUN SERPL-MCNC: 10 MG/DL (ref 8–27)
BUN/CREAT SERPL: 11 (ref 10–24)
CALCIUM SERPL-MCNC: 9.9 MG/DL (ref 8.6–10.2)
CHLORIDE SERPL-SCNC: 101 MMOL/L (ref 96–106)
CHOLEST SERPL-MCNC: 179 MG/DL (ref 100–199)
CO2 SERPL-SCNC: 25 MMOL/L (ref 20–29)
CREAT SERPL-MCNC: 0.95 MG/DL (ref 0.76–1.27)
CREAT UR-MCNC: 89.2 MG/DL
CRP SERPL HS-MCNC: 0.65 MG/L (ref 0–3)
EOSINOPHIL # BLD AUTO: 0.1 X10E3/UL (ref 0–0.4)
EOSINOPHIL NFR BLD AUTO: 2 %
ERYTHROCYTE [DISTWIDTH] IN BLOOD BY AUTOMATED COUNT: 13.8 % (ref 12.3–15.4)
EST. AVERAGE GLUCOSE BLD GHB EST-MCNC: 123 MG/DL
GLOBULIN SER CALC-MCNC: 2.5 G/DL (ref 1.5–4.5)
GLUCOSE SERPL-MCNC: 100 MG/DL (ref 65–99)
HBA1C MFR BLD: 5.9 % (ref 4.8–5.6)
HCT VFR BLD AUTO: 41.1 % (ref 37.5–51)
HDLC SERPL-MCNC: 60 MG/DL
HGB BLD-MCNC: 13.5 G/DL (ref 13–17.7)
IMM GRANULOCYTES # BLD: 0 X10E3/UL (ref 0–0.1)
IMM GRANULOCYTES NFR BLD: 0 %
INTERPRETATION, 910389: NORMAL
LDLC SERPL CALC-MCNC: 108 MG/DL (ref 0–99)
LYMPHOCYTES # BLD AUTO: 2.2 X10E3/UL (ref 0.7–3.1)
LYMPHOCYTES NFR BLD AUTO: 34 %
Lab: NORMAL
MCH RBC QN AUTO: 32.1 PG (ref 26.6–33)
MCHC RBC AUTO-ENTMCNC: 32.8 G/DL (ref 31.5–35.7)
MCV RBC AUTO: 98 FL (ref 79–97)
MICROALBUMIN UR-MCNC: 14.9 UG/ML
MONOCYTES # BLD AUTO: 0.6 X10E3/UL (ref 0.1–0.9)
MONOCYTES NFR BLD AUTO: 9 %
NEUTROPHILS # BLD AUTO: 3.5 X10E3/UL (ref 1.4–7)
NEUTROPHILS NFR BLD AUTO: 55 %
PLATELET # BLD AUTO: 300 X10E3/UL (ref 150–379)
POTASSIUM SERPL-SCNC: 4.5 MMOL/L (ref 3.5–5.2)
PROT SERPL-MCNC: 7.4 G/DL (ref 6–8.5)
RBC # BLD AUTO: 4.21 X10E6/UL (ref 4.14–5.8)
SODIUM SERPL-SCNC: 141 MMOL/L (ref 134–144)
TRIGL SERPL-MCNC: 57 MG/DL (ref 0–149)
VLDLC SERPL CALC-MCNC: 11 MG/DL (ref 5–40)
WBC # BLD AUTO: 6.5 X10E3/UL (ref 3.4–10.8)

## 2019-01-14 NOTE — ASSESSMENT & PLAN NOTE
Stable, based on history, physical exam and review of pertinent labs, studies and medications; meds reconciled; continue current treatment plan. Key Antihyperglycemic Medications   
    
  
 glimepiride (AMARYL) 4 mg tablet  (Taking) TAKE 1 TABLET BY MOUTH ONCE OVER 24 HOURS  
 JANUMET  mg per tablet TAKE 1 TABLET BY MOUTH TWICE A DAY WITH MEALS  
 glimepiride (AMARYL) 4 mg tablet TAKE 1 TABLET BY MOUTH EVERY MORNING Other Key Diabetic Medications   
    
  
 lisinopril (PRINIVIL, ZESTRIL) 20 mg tablet TAKE 1 TABLET BY MOUTH DAILY  
 omega-3 fatty acids cap Take 1,000 mg by mouth daily. Lab Results Component Value Date/Time Hemoglobin A1c 5.9 12/20/2018 11:03 AM  
 Glucose 100 12/20/2018 11:03 AM  
 Creatinine 0.95 12/20/2018 11:03 AM  
 Microalb/Creat ratio (ug/mg creat.) 16.7 12/20/2018 11:03 AM  
 Cholesterol, total 179 12/20/2018 11:03 AM  
 HDL Cholesterol 60 12/20/2018 11:03 AM  
 LDL, calculated 108 12/20/2018 11:03 AM  
 Triglyceride 57 12/20/2018 11:03 AM  
 
Diabetic Foot and Eye Exam HM Status Topic Date Due [facility] Eye Exam  06/12/2015 [facility] Diabetic Foot Care  12/06/2018

## 2019-01-14 NOTE — ASSESSMENT & PLAN NOTE
Stable, based on history, physical exam and review of pertinent labs, studies and medications; meds reconciled; continue current treatment plan., This condition is managed by Specialist. 
[address] Patient is on no Oncologic meds. Key Pain Meds The patient is on no pain meds. Lab Results Component Value Date/Time WBC 6.5 12/20/2018 11:03 AM  
 ABS.  NEUTROPHILS 3.5 12/20/2018 11:03 AM  
 HGB 13.5 12/20/2018 11:03 AM  
 HCT 41.1 12/20/2018 11:03 AM  
 PLATELET 086 95/34/5979 11:03 AM  
 Creatinine 0.95 12/20/2018 11:03 AM  
 BUN 10 12/20/2018 11:03 AM  
 ALT (SGPT) 19 12/20/2018 11:03 AM  
 AST (SGOT) 20 12/20/2018 11:03 AM  
 Albumin 4.9 12/20/2018 11:03 AM  
 Prostate Specific Ag <0.1 06/27/2018 10:45 AM

## 2019-01-14 NOTE — PROGRESS NOTES
Lab Results Component Value Date/Time Microalbumin/Creat ratio (mg/g creat) 18 09/13/2010 09:31 AM  
 Microalb/Creat ratio (ug/mg creat.) 16.7 12/20/2018 11:03 AM  
 Microalbumin,urine random 4.47 09/13/2010 09:31 AM  
  
Chief Complaint Patient presents with  Follow Up Chronic Condition 6 mo check up and fasting labs  
 
he is a 79y.o. year old male who presents for evaluation. See Diabetic Report Card listed above. Patient Active Problem List  
 Diagnosis  Aortic stenosis 5/14 echo normal lvef, mild as/ai mean gradient 10mm, mild tr pa pressure 47mm  Bradycardia  Malignant neoplasm of prostate ([facility]  Other atopic dermatitis and related conditions  Hypogonadism male  DM type 2 (diabetes mellitus, type 2) ([facility]  Mixed hyperlipidemia  
 HTN (hypertension)  Vitamin D deficiency  Cardiac murmur Reviewed PmHx, RxHx, FmHx, SocHx, AllgHx--dated and updated in the chart. Review of Systems - negative except as listed above in the HPI Objective:  
 
Vitals:  
 12/20/18 1003 BP: 150/80 Pulse: 83 Resp: 20 Temp: 98.3 °F (36.8 °C) TempSrc: Oral  
SpO2: 95% Weight: 218 lb 9.6 oz (99.2 kg) Height: 6' 1\" (1.854 m) Physical Examination: General appearance - alert, well appearing, and in no distress and normal appearing weight Mental status - alert, oriented to person, place, and time Mouth - mucous membranes moist, pharynx normal without lesions Neck - supple, no significant adenopathy Chest - clear to auscultation, no wheezes, rales or rhonchi, symmetric air entry Heart - normal rate, regular rhythm, normal S1, S2, no murmurs, rubs, clicks or gallops Back exam - full range of motion, no tenderness, palpable spasm or pain on motion Neurological - alert, oriented, normal speech, no focal findings or movement disorder noted Musculoskeletal - no joint tenderness, deformity or swelling Extremities - peripheral pulses normal, no pedal edema, no clubbing or cyanosis Skin - normal coloration and turgor, no rashes, no suspicious skin lesions noted Examination of the feet reveals warm, good capillary refill, nail exam normal nails without lesions, normal DP and PT pulses, normal monofilament exam and normal sensory exam. 
 
 
Assessment/ Plan:  
Diagnoses and all orders for this visit: 
 
1. Type 2 diabetes mellitus without complication, without long-term current use of insulin ([facility] [address] Assessment & Plan: 
Stable, based on history, physical exam and review of pertinent labs, studies and medications; meds reconciled; continue current treatment plan. Key Antihyperglycemic Medications   
    
  
 glimepiride (AMARYL) 4 mg tablet  (Taking) TAKE 1 TABLET BY MOUTH ONCE OVER 24 HOURS  
 JANUMET  mg per tablet TAKE 1 TABLET BY MOUTH TWICE A DAY WITH MEALS  
 glimepiride (AMARYL) 4 mg tablet TAKE 1 TABLET BY MOUTH EVERY MORNING Other Key Diabetic Medications   
    
  
 lisinopril (PRINIVIL, ZESTRIL) 20 mg tablet TAKE 1 TABLET BY MOUTH DAILY  
 omega-3 fatty acids cap Take 1,000 mg by mouth daily. Lab Results Component Value Date/Time Hemoglobin A1c 5.9 12/20/2018 11:03 AM  
 Glucose 100 12/20/2018 11:03 AM  
 Creatinine 0.95 12/20/2018 11:03 AM  
 Microalb/Creat ratio (ug/mg creat.) 16.7 12/20/2018 11:03 AM  
 Cholesterol, total 179 12/20/2018 11:03 AM  
 HDL Cholesterol 60 12/20/2018 11:03 AM  
 LDL, calculated 108 12/20/2018 11:03 AM  
 Triglyceride 57 12/20/2018 11:03 AM  
 
Diabetic Foot and Eye Exam  Status Topic Date Due 24 [facility] Eye Exam  06/12/2015 24 [facility] Diabetic Foot Care  12/06/2018 Orders: 
-     glimepiride (AMARYL) 4 mg tablet; TAKE 1 TABLET BY MOUTH ONCE OVER 24 HOURS 
-     MICROALBUMIN, UR, RAND W/ MICROALB/CREAT RATIO 
-     METABOLIC PANEL, COMPREHENSIVE 
-     CBC WITH AUTOMATED DIFF 
-     HEMOGLOBIN A1C WITH EAG 
-      DIABETES FOOT EXAM 
 
 2. Mixed hyperlipidemia -     LIPID PANEL 
-     CRP, HIGH SENSITIVITY 3. Vitamin D deficiency 
-     VITAMIN D, 25 HYDROXY 4. Encounter for immunization 
-     INFLUENZA VACCINE INACTIVATED (IIV), SUBUNIT, ADJUVANTED, IM 
 
5. Malignant neoplasm of prostate ([facility] Assessment & Plan: 
Stable, based on history, physical exam and review of pertinent labs, studies and medications; meds reconciled; continue current treatment plan., This condition is managed by Specialist. 
[address] Patient is on no Oncologic meds. Key Pain Meds The patient is on no pain meds. Lab Results Component Value Date/Time WBC 6.5 12/20/2018 11:03 AM  
 ABS. NEUTROPHILS 3.5 12/20/2018 11:03 AM  
 HGB 13.5 12/20/2018 11:03 AM  
 HCT 41.1 12/20/2018 11:03 AM  
 PLATELET 518 84/07/9095 11:03 AM  
 Creatinine 0.95 12/20/2018 11:03 AM  
 BUN 10 12/20/2018 11:03 AM  
 ALT (SGPT) 19 12/20/2018 11:03 AM  
 AST (SGOT) 20 12/20/2018 11:03 AM  
 Albumin 4.9 12/20/2018 11:03 AM  
 Prostate Specific Ag <0.1 06/27/2018 10:45 AM  
 
6. Encounter for diabetic foot exam ([facility] 
-      DIABETES FOOT EXAM 
 
Other orders 
-     hydrocortisone (HYTONE) 2.5 % topical cream; Apply  to affected area two (2) times a day. use thin layer -     pneumococcal 13 jimmie conj dip (PREVNAR-13) 0.5 mL syrg injection; 0.5 mL by IntraMUSCular route PRIOR TO DISCHARGE for 1 dose. 
-     varicella-zoster recombinant, PF, (SHINGRIX, PF,) 50 mcg/0.5 mL susr injection; 0.5 mL by IntraMUSCular route once for 1 dose. 
-     diph,pertuss,acel,,tetanus vac,PF, (ADACEL) 2 Lf-(2.5-5-3-5 mcg)-5Lf/0.5 mL syrg vaccine; 0.5 mL by IntraMUSCular route once for 1 dose. -     CVD REPORT 
-     DIABETES PATIENT EDUCATION Follow-up Disposition: Not on File Lab Results Component Value Date/Time  Cholesterol, total 179 12/20/2018 11:03 AM  
 HDL Cholesterol 60 12/20/2018 11:03 AM  
 LDL, calculated 108 (H) 12/20/2018 11:03 AM  
 Triglyceride 57 12/20/2018 11:03 AM  
 CHOL/HDL Ratio 2.1 09/13/2010 09:31 AM  
 
Lab Results Component Value Date/Time Hemoglobin A1c 5.9 (H) 12/20/2018 11:03 AM  
 Hemoglobin A1c 10.7 (H) 06/27/2018 10:45 AM  
 Hemoglobin A1c 6.1 (H) 12/06/2017 11:56 AM  
 Microalbumin/Creat ratio (mg/g creat) 18 09/13/2010 09:31 AM  
 Microalb/Creat ratio (ug/mg creat.) 16.7 12/20/2018 11:03 AM  
 Microalbumin,urine random 4.47 09/13/2010 09:31 AM  
 LDL, calculated 108 (H) 12/20/2018 11:03 AM  
 Creatinine 0.95 12/20/2018 11:03 AM  
  
 
 
Discussed with patient goal of Diabetes to include:  HgA1C <7, LDL cholesterol <100, Blood pressure <140/80. Discussed with patient diet and weight management and to get regular exercise. Recommend yearly eye exams and daily foot care. The patient understands and agrees with the plan. I have discussed the diagnosis with the patient and the intended plan as seen in the above orders. The patient has received an after-visit summary and questions were answered concerning future plans. Medication Side Effects and Warnings were discussed with patient Patient Labs were reviewed and or requested Patient Past Records were reviewed and or requested Vielka Abrams MD 
[place], [place] Patient Instructions Diabetes Foot Health: Care Instructions Your Care Instructions When you have diabetes, your feet need extra care and attention. Diabetes can damage the nerve endings and blood vessels in your feet, making you less likely to notice when your feet are injured. Diabetes also limits your body's ability to fight infection and get blood to areas that need it. If you get a minor foot injury, it could become an ulcer or a serious infection. With good foot care, you can prevent most of these problems. Caring for your feet can be quick and easy. Most of the care can be done when you are bathing or getting ready for bed. Follow-up care is a key part of your treatment and safety. Be sure to make and go to all appointments, and call your doctor if you are having problems. It's also a good idea to know your test results and keep a list of the medicines you take. How can you care for yourself at home? · Keep your blood sugar close to normal by watching what and how much you eat, monitoring blood sugar, taking medicines if prescribed, and getting regular exercise. · Do not smoke. Smoking affects blood flow and can make foot problems worse. If you need help quitting, talk to your doctor about stop-smoking programs and medicines. These can increase your chances of quitting for good. · Eat a diet that is low in fats. High fat intake can cause fat to build up in your blood vessels and decrease blood flow. · Inspect your feet daily for blisters, cuts, cracks, or sores. If you cannot see well, use a mirror or have someone help you. · Take care of your feet: 
? Wash your feet every day. Use warm (not hot) water. Check the water temperature with your wrists or other part of your body, not your feet. ? Dry your feet well. Pat them dry. Do not rub the skin on your feet too hard. Dry well between your toes. If the skin on your feet stays moist, bacteria or a fungus can grow, which can lead to infection. ? Keep your skin soft. Use moisturizing skin cream to keep the skin on your feet soft and prevent calluses and cracks. But do not put the cream between your toes, and stop using any cream that causes a rash. ? Clean underneath your toenails carefully. Do not use a sharp object to clean underneath your toenails. Use the blunt end of a nail file or other rounded tool. ? Trim and file your toenails straight across to prevent ingrown toenails. Use a nail clipper, not scissors. Use an emery board to smooth the edges. · Change socks daily.  Socks without seams are best, because seams often rub the feet. You can find socks for people with diabetes from specialty catalogs. · Look inside your shoes every day for things like gravel or torn linings, which could cause blisters or sores. · Buy shoes that fit well: 
? Look for shoes that have plenty of space around the toes. This helps prevent bunions and blisters. ? Try on shoes while wearing the kind of socks you will usually wear with the shoes. ? Avoid plastic shoes. They may rub your feet and cause blisters. Good shoes should be made of materials that are flexible and breathable, such as leather or cloth. ? Break in new shoes slowly by wearing them for no more than an hour a day for several days. Take extra time to check your feet for red areas, blisters, or other problems after you wear new shoes. · Do not go barefoot. Do not wear sandals, and do not wear shoes with very thin soles. Thin soles are easy to puncture. They also do not protect your feet from hot pavement or cold weather. · Have your doctor check your feet during each visit. If you have a foot problem, see your doctor. Do not try to treat an early foot problem at home. Home remedies or treatments that you can buy without a prescription (such as corn removers) can be harmful. · Always get early treatment for foot problems. A minor irritation can lead to a major problem if not properly cared for early. When should you call for help?  
Call your doctor now or seek immediate medical care if: 
  · You have a foot sore, an ulcer or break in the skin that is not healing after 4 days, bleeding corns or calluses, or an ingrown toenail.  
  · You have blue or black areas, which can mean bruising or blood flow problems.  
  · You have peeling skin or tiny blisters between your toes or cracking or oozing of the skin.  
  · You have a fever for more than 24 hours and a foot sore.  
  · You have new numbness or tingling in your feet that does not go away after you move your feet or change positions.  
  · You have unexplained or unusual swelling of the foot or ankle.  
 Watch closely for changes in your health, and be sure to contact your doctor if: 
  · You cannot do proper foot care. Where can you learn more? Go to http://darrick-dyllan.info/. Enter A739 in the search box to learn more about \"Diabetes Foot Health: Care Instructions. \" Current as of: December 7, 2017 Content Version: 11.8 © 9521-1818 [employer]. Care instructions adapted under license by [employer] (which disclaims liability or warranty for this information). If you have questions about a medical condition or this instruction, always ask your healthcare professional. [address] any warranty or liability for your use of this information.

## 2019-02-25 RX ORDER — SITAGLIPTIN AND METFORMIN HYDROCHLORIDE 500; 50 MG/1; MG/1
TABLET, FILM COATED ORAL
Qty: 180 TAB | Refills: 0 | Status: SHIPPED | OUTPATIENT
Start: 2019-02-25 | End: 2019-05-30 | Stop reason: SDUPTHER

## 2019-05-09 DIAGNOSIS — E11.9 TYPE 2 DIABETES MELLITUS WITHOUT COMPLICATION, WITHOUT LONG-TERM CURRENT USE OF INSULIN (HCC): ICD-10-CM

## 2019-05-09 RX ORDER — GLIMEPIRIDE 4 MG/1
TABLET ORAL
Qty: 90 TAB | Refills: 1 | Status: SHIPPED | OUTPATIENT
Start: 2019-05-09 | End: 2019-09-21 | Stop reason: SDUPTHER

## 2019-05-31 RX ORDER — SITAGLIPTIN AND METFORMIN HYDROCHLORIDE 500; 50 MG/1; MG/1
TABLET, FILM COATED ORAL
Qty: 180 TAB | Refills: 0 | Status: SHIPPED | OUTPATIENT
Start: 2019-05-31 | End: 2019-09-05 | Stop reason: SDUPTHER

## 2019-09-05 RX ORDER — SITAGLIPTIN AND METFORMIN HYDROCHLORIDE 500; 50 MG/1; MG/1
TABLET, FILM COATED ORAL
Qty: 60 TAB | Refills: 2 | Status: SHIPPED | OUTPATIENT
Start: 2019-09-05 | End: 2019-12-07 | Stop reason: SDUPTHER

## 2019-09-11 RX ORDER — HYDROCORTISONE 25 MG/G
CREAM TOPICAL 2 TIMES DAILY
Qty: 28.35 G | Refills: 3 | Status: SHIPPED | OUTPATIENT
Start: 2019-09-11 | End: 2020-09-16

## 2019-09-21 DIAGNOSIS — E11.9 TYPE 2 DIABETES MELLITUS WITHOUT COMPLICATION, WITHOUT LONG-TERM CURRENT USE OF INSULIN (HCC): ICD-10-CM

## 2019-09-24 RX ORDER — GLIMEPIRIDE 4 MG/1
TABLET ORAL
Qty: 90 TAB | Refills: 1 | Status: SHIPPED | OUTPATIENT
Start: 2019-09-24 | End: 2020-02-03

## 2019-11-13 DIAGNOSIS — I10 ESSENTIAL HYPERTENSION WITH GOAL BLOOD PRESSURE LESS THAN 140/90: ICD-10-CM

## 2019-11-13 RX ORDER — LISINOPRIL 20 MG/1
TABLET ORAL
Qty: 90 TAB | Refills: 1 | Status: SHIPPED | OUTPATIENT
Start: 2019-11-13 | End: 2020-05-02

## 2019-12-07 RX ORDER — SITAGLIPTIN AND METFORMIN HYDROCHLORIDE 500; 50 MG/1; MG/1
TABLET, FILM COATED ORAL
Qty: 60 TAB | Refills: 2 | Status: SHIPPED | OUTPATIENT
Start: 2019-12-07 | End: 2020-03-06

## 2020-02-03 DIAGNOSIS — E11.9 TYPE 2 DIABETES MELLITUS WITHOUT COMPLICATION, WITHOUT LONG-TERM CURRENT USE OF INSULIN (HCC): ICD-10-CM

## 2020-02-03 RX ORDER — GLIMEPIRIDE 4 MG/1
TABLET ORAL
Qty: 90 TAB | Refills: 1 | Status: SHIPPED | OUTPATIENT
Start: 2020-02-03 | End: 2020-06-17

## 2020-03-06 RX ORDER — SITAGLIPTIN AND METFORMIN HYDROCHLORIDE 500; 50 MG/1; MG/1
TABLET, FILM COATED ORAL
Qty: 60 TAB | Refills: 2 | Status: SHIPPED | OUTPATIENT
Start: 2020-03-06 | End: 2020-06-04

## 2020-05-02 DIAGNOSIS — I10 ESSENTIAL HYPERTENSION WITH GOAL BLOOD PRESSURE LESS THAN 140/90: ICD-10-CM

## 2020-05-02 RX ORDER — LISINOPRIL 20 MG/1
TABLET ORAL
Qty: 90 TAB | Refills: 1 | Status: SHIPPED | OUTPATIENT
Start: 2020-05-02 | End: 2020-10-30

## 2020-06-04 RX ORDER — SITAGLIPTIN AND METFORMIN HYDROCHLORIDE 500; 50 MG/1; MG/1
TABLET, FILM COATED ORAL
Qty: 60 TAB | Refills: 2 | Status: SHIPPED | OUTPATIENT
Start: 2020-06-04 | End: 2020-09-04

## 2020-06-17 ENCOUNTER — HOSPITAL ENCOUNTER (OUTPATIENT)
Dept: LAB | Age: 70
Discharge: HOME | End: 2020-06-17

## 2020-06-17 ENCOUNTER — OFFICE VISIT (OUTPATIENT)
Dept: FAMILY MEDICINE CLINIC | Age: 70
End: 2020-06-17

## 2020-06-17 VITALS
HEIGHT: 73 IN | BODY MASS INDEX: 29.69 KG/M2 | RESPIRATION RATE: 16 BRPM | OXYGEN SATURATION: 98 % | HEART RATE: 60 BPM | DIASTOLIC BLOOD PRESSURE: 78 MMHG | TEMPERATURE: 98.4 F | SYSTOLIC BLOOD PRESSURE: 126 MMHG | WEIGHT: 224 LBS

## 2020-06-17 DIAGNOSIS — Z12.5 SCREENING FOR PROSTATE CANCER: ICD-10-CM

## 2020-06-17 DIAGNOSIS — E78.2 MIXED HYPERLIPIDEMIA: ICD-10-CM

## 2020-06-17 DIAGNOSIS — Z00.00 MEDICARE ANNUAL WELLNESS VISIT, SUBSEQUENT: Primary | ICD-10-CM

## 2020-06-17 DIAGNOSIS — C61 MALIGNANT NEOPLASM OF PROSTATE (HCC): ICD-10-CM

## 2020-06-17 DIAGNOSIS — Z12.11 SCREENING FOR COLON CANCER: ICD-10-CM

## 2020-06-17 DIAGNOSIS — E11.9 TYPE 2 DIABETES MELLITUS WITHOUT COMPLICATION, WITHOUT LONG-TERM CURRENT USE OF INSULIN (HCC): ICD-10-CM

## 2020-06-17 DIAGNOSIS — Z13.5 SCREENING FOR GLAUCOMA: ICD-10-CM

## 2020-06-17 DIAGNOSIS — Z13.0 SCREENING FOR DEFICIENCY ANEMIA: ICD-10-CM

## 2020-06-17 DIAGNOSIS — N40.0 BENIGN PROSTATIC HYPERPLASIA WITHOUT LOWER URINARY TRACT SYMPTOMS: ICD-10-CM

## 2020-06-17 DIAGNOSIS — Z23 ENCOUNTER FOR IMMUNIZATION: ICD-10-CM

## 2020-06-17 DIAGNOSIS — E55.9 VITAMIN D DEFICIENCY: ICD-10-CM

## 2020-06-17 DIAGNOSIS — E11.9 ENCOUNTER FOR DIABETIC FOOT EXAM (HCC): ICD-10-CM

## 2020-06-17 LAB
25(OH)D3 SERPL-MCNC: 20.1 NG/ML (ref 30–100)
ALBUMIN SERPL-MCNC: 4.5 G/DL (ref 3.5–5)
ALBUMIN/GLOB SERPL: 1.3 {RATIO} (ref 1.1–2.2)
ALP SERPL-CCNC: 50 U/L (ref 45–117)
ALT SERPL-CCNC: 27 U/L (ref 12–78)
ANION GAP SERPL CALC-SCNC: 8 MMOL/L (ref 5–15)
AST SERPL-CCNC: 15 U/L (ref 15–37)
BASOPHILS # BLD: 0 K/UL (ref 0–0.1)
BASOPHILS NFR BLD: 0 % (ref 0–1)
BILIRUB SERPL-MCNC: 0.5 MG/DL (ref 0.2–1)
BUN SERPL-MCNC: 12 MG/DL (ref 6–20)
BUN/CREAT SERPL: 13 (ref 12–20)
CALCIUM SERPL-MCNC: 10.1 MG/DL (ref 8.5–10.1)
CHLORIDE SERPL-SCNC: 105 MMOL/L (ref 97–108)
CHOLEST SERPL-MCNC: 205 MG/DL
CO2 SERPL-SCNC: 26 MMOL/L (ref 21–32)
CREAT SERPL-MCNC: 0.95 MG/DL (ref 0.7–1.3)
CREAT UR-MCNC: 229 MG/DL
DIFFERENTIAL METHOD BLD: ABNORMAL
EOSINOPHIL # BLD: 0.1 K/UL (ref 0–0.4)
EOSINOPHIL NFR BLD: 1 % (ref 0–7)
ERYTHROCYTE [DISTWIDTH] IN BLOOD BY AUTOMATED COUNT: 13.5 % (ref 11.5–14.5)
EST. AVERAGE GLUCOSE BLD GHB EST-MCNC: 137 MG/DL
GLOBULIN SER CALC-MCNC: 3.4 G/DL (ref 2–4)
GLUCOSE SERPL-MCNC: 127 MG/DL (ref 65–100)
HBA1C MFR BLD: 6.4 % (ref 4–5.6)
HCT VFR BLD AUTO: 42.2 % (ref 36.6–50.3)
HDLC SERPL-MCNC: 57 MG/DL
HDLC SERPL: 3.6 {RATIO} (ref 0–5)
HGB BLD-MCNC: 13.5 G/DL (ref 12.1–17)
IMM GRANULOCYTES # BLD AUTO: 0 K/UL (ref 0–0.04)
IMM GRANULOCYTES NFR BLD AUTO: 0 % (ref 0–0.5)
LDLC SERPL CALC-MCNC: 130.8 MG/DL (ref 0–100)
LIPID PROFILE,FLP: ABNORMAL
LYMPHOCYTES # BLD: 2.1 K/UL (ref 0.8–3.5)
LYMPHOCYTES NFR BLD: 28 % (ref 12–49)
MCH RBC QN AUTO: 32.4 PG (ref 26–34)
MCHC RBC AUTO-ENTMCNC: 32 G/DL (ref 30–36.5)
MCV RBC AUTO: 101.2 FL (ref 80–99)
MICROALBUMIN UR-MCNC: 3.91 MG/DL
MICROALBUMIN/CREAT UR-RTO: 17 MG/G (ref 0–30)
MONOCYTES # BLD: 0.6 K/UL (ref 0–1)
MONOCYTES NFR BLD: 7 % (ref 5–13)
NEUTS SEG # BLD: 4.9 K/UL (ref 1.8–8)
NEUTS SEG NFR BLD: 64 % (ref 32–75)
NRBC # BLD: 0 K/UL (ref 0–0.01)
NRBC BLD-RTO: 0 PER 100 WBC
PLATELET # BLD AUTO: 279 K/UL (ref 150–400)
PMV BLD AUTO: 10.9 FL (ref 8.9–12.9)
POTASSIUM SERPL-SCNC: 4.4 MMOL/L (ref 3.5–5.1)
PROT SERPL-MCNC: 7.9 G/DL (ref 6.4–8.2)
PSA SERPL-MCNC: 0 NG/ML (ref 0.01–4)
RBC # BLD AUTO: 4.17 M/UL (ref 4.1–5.7)
SODIUM SERPL-SCNC: 139 MMOL/L (ref 136–145)
TRIGL SERPL-MCNC: 86 MG/DL (ref ?–150)
VLDLC SERPL CALC-MCNC: 17.2 MG/DL
WBC # BLD AUTO: 7.7 K/UL (ref 4.1–11.1)

## 2020-06-17 RX ORDER — ZOSTER VACCINE RECOMBINANT, ADJUVANTED 50 MCG/0.5
0.5 KIT INTRAMUSCULAR ONCE
Qty: 0.5 ML | Refills: 0 | Status: SHIPPED | OUTPATIENT
Start: 2020-06-17 | End: 2020-06-17

## 2020-06-17 RX ORDER — GLIMEPIRIDE 4 MG/1
TABLET ORAL
Qty: 90 TAB | Refills: 3 | Status: SHIPPED | OUTPATIENT
Start: 2020-06-17 | End: 2021-03-16

## 2020-06-17 NOTE — PROGRESS NOTES
Identified pt with two pt identifiers(name and ). Chief Complaint   Patient presents with    Diabetes     6m f/u        Health Maintenance Due   Topic    DTaP/Tdap/Td series (1 - Tdap)    Shingrix Vaccine Age 49> (1 of 2)    Colonoscopy     Eye Exam Retinal or Dilated     GLAUCOMA SCREENING Q2Y     Pneumococcal 65+ years (1 of 1 - PPSV23)    Medicare Yearly Exam     A1C test (Diabetic or Prediabetic)     MICROALBUMIN Q1     Lipid Screen     Foot Exam Q1        Wt Readings from Last 3 Encounters:   20 224 lb (101.6 kg)   18 218 lb 9.6 oz (99.2 kg)   18 222 lb 9.6 oz (101 kg)     Temp Readings from Last 3 Encounters:   20 98.4 °F (36.9 °C) (Oral)   18 98.3 °F (36.8 °C) (Oral)   18 98.3 °F (36.8 °C) (Oral)     BP Readings from Last 3 Encounters:   20 126/78   18 150/80   18 138/77     Pulse Readings from Last 3 Encounters:   20 60   18 83   18 (!) 57         Learning Assessment:  :     Learning Assessment 2016   PRIMARY LEARNER Patient Patient   HIGHEST LEVEL OF EDUCATION - PRIMARY LEARNER  GRADUATED HIGH SCHOOL OR GED -   BARRIERS PRIMARY LEARNER NONE -   CO-LEARNER CAREGIVER No -   PRIMARY LANGUAGE ENGLISH ENGLISH   LEARNER PREFERENCE PRIMARY OTHER (COMMENT) DEMONSTRATION   ANSWERED BY self patient   RELATIONSHIP SELF SELF       Depression Screening:  :     3 most recent PHQ Screens 2020   Little interest or pleasure in doing things Not at all   Feeling down, depressed, irritable, or hopeless Not at all   Total Score PHQ 2 0       Fall Risk Assessment:  :     Fall Risk Assessment, last 12 mths 2020   Able to walk? Yes   Fall in past 12 months? No       Abuse Screening:  :     Abuse Screening Questionnaire 2020   Do you ever feel afraid of your partner? N N N N   Are you in a relationship with someone who physically or mentally threatens you?  N N N N   Is it safe for you to go home? Y Y Y Y       Coordination of Care Questionnaire:  :     1) Have you been to an emergency room, urgent care clinic since your last visit? no   Hospitalized since your last visit? no             2) Have you seen or consulted any other health care providers outside of [address] [place] [address] since your last visit? no  (Include any pap smears or colon screenings in this section.)    3) Do you have an Advance Directive on file? no  Are you interested in receiving information about Advance Directives? no    Reviewed record in preparation for visit and have obtained necessary documentation.

## 2020-06-17 NOTE — PATIENT INSTRUCTIONS
Well Visit, Over 72: Care Instructions Your Care Instructions Physical exams can help you stay healthy. Your doctor has checked your overall health and may have suggested ways to take good care of yourself. He or she also may have recommended tests. At home, you can help prevent illness with healthy eating, regular exercise, and other steps. Follow-up care is a key part of your treatment and safety. Be sure to make and go to all appointments, and call your doctor if you are having problems. It's also a good idea to know your test results and keep a list of the medicines you take. How can you care for yourself at home? · Reach and stay at a healthy weight. This will lower your risk for many problems, such as obesity, diabetes, heart disease, and high blood pressure. · Get at least 30 minutes of exercise on most days of the week. Walking is a good choice. You also may want to do other activities, such as running, swimming, cycling, or playing tennis or team sports. · Do not smoke. Smoking can make health problems worse. If you need help quitting, talk to your doctor about stop-smoking programs and medicines. These can increase your chances of quitting for good. · Protect your skin from too much sun. When you're outdoors from 10 a.m. to 4 p.m., stay in the shade or cover up with clothing and a hat with a wide brim. Wear sunglasses that block UV rays. Even when it's cloudy, put broad-spectrum sunscreen (SPF 30 or higher) on any exposed skin. · See a dentist one or two times a year for checkups and to have your teeth cleaned. · Wear a seat belt in the car. Follow your doctor's advice about when to have certain tests. These tests can spot problems early. For men and women · Cholesterol. Your doctor will tell you how often to have this done based on your overall health and other things that can increase your risk for heart attack and stroke. · Blood pressure. Have your blood pressure checked during a routine doctor visit. Your doctor will tell you how often to check your blood pressure based on your age, your blood pressure results, and other factors. · Diabetes. Ask your doctor whether you should have tests for diabetes. · Vision. Experts recommend that you have yearly exams for glaucoma and other age-related eye problems. · Hearing. Tell your doctor if you notice any change in your hearing. You can have tests to find out how well you hear. · Colon cancer tests. Keep having colon cancer tests as your doctor recommends. You can have one of several types of tests. · Heart attack and stroke risk. At least every 4 to 6 years, you should have your risk for heart attack and stroke assessed. Your doctor uses factors such as your age, blood pressure, cholesterol, and whether you smoke or have diabetes to show what your risk for a heart attack or stroke is over the next 10 years. · Osteoporosis. Talk to your doctor about whether you should have a bone density test to find out whether you have thinning bones. Ask your doctor if you need to take a calcium plus vitamin D supplement. You may be able to get enough calcium and vitamin D through your diet. For women · Pap test and pelvic exam. You may no longer need a Pap test. Talk with your doctor about whether to stop or continue to have Pap tests. · Breast exam and mammogram. Ask how often you should have a mammogram, which is an X-ray of your breasts. A mammogram can spot breast cancer before it can be felt and when it is easiest to treat. · Thyroid disease. Talk to your doctor about whether to have your thyroid checked as part of a regular physical exam. Women have an increased chance of a thyroid problem. For men · Prostate exam. Talk to your doctor about whether you should have a blood test (called a PSA test) for prostate cancer.  Experts recommend that you discuss the benefits and risks of the test with your doctor before you decide whether to have this test. Some experts say that men ages 79 and older no longer need testing. · Abdominal aortic aneurysm. Ask your doctor whether you should have a test to check for an aneurysm. You may need a test if you ever smoked or if your parent, brother, sister, or child has had an aneurysm. When should you call for help? Watch closely for changes in your health, and be sure to contact your doctor if you have any problems or symptoms that concern you. Where can you learn more? Go to http://darrick-dyllan.info/ Enter A856 in the search box to learn more about \"Well Visit, Over 65: Care Instructions. \" Current as of: August 22, 2019               Content Version: 12.5 © 8784-7331 [employer]. Care instructions adapted under license by [employer] (which disclaims liability or warranty for this information). If you have questions about a medical condition or this instruction, always ask your healthcare professional. [place] [address] any warranty or liability for your use of this information. Well Visit, Over 72: Care Instructions Your Care Instructions Physical exams can help you stay healthy. Your doctor has checked your overall health and may have suggested ways to take good care of yourself. He or she also may have recommended tests. At home, you can help prevent illness with healthy eating, regular exercise, and other steps. Follow-up care is a key part of your treatment and safety. Be sure to make and go to all appointments, and call your doctor if you are having problems. It's also a good idea to know your test results and keep a list of the medicines you take. How can you care for yourself at home? · Reach and stay at a healthy weight.  This will lower your risk for many problems, such as obesity, diabetes, heart disease, and high blood pressure. · Get at least 30 minutes of exercise on most days of the week. Walking is a good choice. You also may want to do other activities, such as running, swimming, cycling, or playing tennis or team sports. · Do not smoke. Smoking can make health problems worse. If you need help quitting, talk to your doctor about stop-smoking programs and medicines. These can increase your chances of quitting for good. · Protect your skin from too much sun. When you're outdoors from 10 a.m. to 4 p.m., stay in the shade or cover up with clothing and a hat with a wide brim. Wear sunglasses that block UV rays. Even when it's cloudy, put broad-spectrum sunscreen (SPF 30 or higher) on any exposed skin. · See a dentist one or two times a year for checkups and to have your teeth cleaned. · Wear a seat belt in the car. Follow your doctor's advice about when to have certain tests. These tests can spot problems early. For men and women · Cholesterol. Your doctor will tell you how often to have this done based on your overall health and other things that can increase your risk for heart attack and stroke. · Blood pressure. Have your blood pressure checked during a routine doctor visit. Your doctor will tell you how often to check your blood pressure based on your age, your blood pressure results, and other factors. · Diabetes. Ask your doctor whether you should have tests for diabetes. · Vision. Experts recommend that you have yearly exams for glaucoma and other age-related eye problems. · Hearing. Tell your doctor if you notice any change in your hearing. You can have tests to find out how well you hear. · Colon cancer tests. Keep having colon cancer tests as your doctor recommends. You can have one of several types of tests. · Heart attack and stroke risk. At least every 4 to 6 years, you should have your risk for heart attack and stroke assessed.  Your doctor uses factors such as your age, blood pressure, cholesterol, and whether you smoke or have diabetes to show what your risk for a heart attack or stroke is over the next 10 years. · Osteoporosis. Talk to your doctor about whether you should have a bone density test to find out whether you have thinning bones. Ask your doctor if you need to take a calcium plus vitamin D supplement. You may be able to get enough calcium and vitamin D through your diet. For women · Pap test and pelvic exam. You may no longer need a Pap test. Talk with your doctor about whether to stop or continue to have Pap tests. · Breast exam and mammogram. Ask how often you should have a mammogram, which is an X-ray of your breasts. A mammogram can spot breast cancer before it can be felt and when it is easiest to treat. · Thyroid disease. Talk to your doctor about whether to have your thyroid checked as part of a regular physical exam. Women have an increased chance of a thyroid problem. For men · Prostate exam. Talk to your doctor about whether you should have a blood test (called a PSA test) for prostate cancer. Experts recommend that you discuss the benefits and risks of the test with your doctor before you decide whether to have this test. Some experts say that men ages 79 and older no longer need testing. · Abdominal aortic aneurysm. Ask your doctor whether you should have a test to check for an aneurysm. You may need a test if you ever smoked or if your parent, brother, sister, or child has had an aneurysm. When should you call for help? Watch closely for changes in your health, and be sure to contact your doctor if you have any problems or symptoms that concern you. Where can you learn more? Go to http://darrick-dyllan.info/ Enter O050 in the search box to learn more about \"Well Visit, Over 65: Care Instructions. \" Current as of: August 22, 2019               Content Version: 12.5 © 1427-8972 [employer]. Care instructions adapted under license by [employer] (which disclaims liability or warranty for this information). If you have questions about a medical condition or this instruction, always ask your healthcare professional. [address] any warranty or liability for your use of this information. Diabetes Foot Health: Care Instructions Your Care Instructions When you have diabetes, your feet need extra care and attention. Diabetes can damage the nerve endings and blood vessels in your feet, making you less likely to notice when your feet are injured. Diabetes also limits your body's ability to fight infection and get blood to areas that need it. If you get a minor foot injury, it could become an ulcer or a serious infection. With good foot care, you can prevent most of these problems. Caring for your feet can be quick and easy. Most of the care can be done when you are bathing or getting ready for bed. Follow-up care is a key part of your treatment and safety. Be sure to make and go to all appointments, and call your doctor if you are having problems. It's also a good idea to know your test results and keep a list of the medicines you take. How can you care for yourself at home? · Keep your blood sugar close to normal by watching what and how much you eat, monitoring blood sugar, taking medicines if prescribed, and getting regular exercise. · Do not smoke. Smoking affects blood flow and can make foot problems worse. If you need help quitting, talk to your doctor about stop-smoking programs and medicines. These can increase your chances of quitting for good. · Eat a diet that is low in fats. High fat intake can cause fat to build up in your blood vessels and decrease blood flow. · Inspect your feet daily for blisters, cuts, cracks, or sores. If you cannot see well, use a mirror or have someone help you. · Take care of your feet: 
? Wash your feet every day. Use warm (not hot) water. Check the water temperature with your wrists or other part of your body, not your feet. ? Dry your feet well. Pat them dry. Do not rub the skin on your feet too hard. Dry well between your toes. If the skin on your feet stays moist, bacteria or a fungus can grow, which can lead to infection. ? Keep your skin soft. Use moisturizing skin cream to keep the skin on your feet soft and prevent calluses and cracks. But do not put the cream between your toes, and stop using any cream that causes a rash. ? Clean underneath your toenails carefully. Do not use a sharp object to clean underneath your toenails. Use the blunt end of a nail file or other rounded tool. ? Trim and file your toenails straight across to prevent ingrown toenails. Use a nail clipper, not scissors. Use an emery board to smooth the edges. · Change socks daily. Socks without seams are best, because seams often rub the feet. You can find socks for people with diabetes from specialty catalogs. · Look inside your shoes every day for things like gravel or torn linings, which could cause blisters or sores. · Buy shoes that fit well: 
? Look for shoes that have plenty of space around the toes. This helps prevent bunions and blisters. ? Try on shoes while wearing the kind of socks you will usually wear with the shoes. ? Avoid plastic shoes. They may rub your feet and cause blisters. Good shoes should be made of materials that are flexible and breathable, such as leather or cloth. ? Break in new shoes slowly by wearing them for no more than an hour a day for several days. Take extra time to check your feet for red areas, blisters, or other problems after you wear new shoes. · Do not go barefoot. Do not wear sandals, and do not wear shoes with very thin soles. Thin soles are easy to puncture. They also do not protect your feet from hot pavement or cold weather. · Have your doctor check your feet during each visit. If you have a foot problem, see your doctor. Do not try to treat an early foot problem at home. Home remedies or treatments that you can buy without a prescription (such as corn removers) can be harmful. · Always get early treatment for foot problems. A minor irritation can lead to a major problem if not properly cared for early. When should you call for help? Call your doctor now or seek immediate medical care if: 
· You have a foot sore, an ulcer or break in the skin that is not healing after 4 days, bleeding corns or calluses, or an ingrown toenail. · You have blue or black areas, which can mean bruising or blood flow problems. · You have peeling skin or tiny blisters between your toes or cracking or oozing of the skin. · You have a fever for more than 24 hours and a foot sore. · You have new numbness or tingling in your feet that does not go away after you move your feet or change positions. · You have unexplained or unusual swelling of the foot or ankle. Watch closely for changes in your health, and be sure to contact your doctor if: 
· You cannot do proper foot care. Where can you learn more? Go to http://darrick-dyllan.info/ Enter A739 in the search box to learn more about \"Diabetes Foot Health: Care Instructions. \" Current as of: December 20, 2019               Content Version: 12.5 © 4858-7602 [employer]. Care instructions adapted under license by [employer] (which disclaims liability or warranty for this information). If you have questions about a medical condition or this instruction, always ask your healthcare professional. [address] any warranty or liability for your use of this information.

## 2020-06-17 NOTE — PROGRESS NOTES
CC:  Chief Complaint   Patient presents with    Diabetes     6m f/u    Annual Wellness Visit       This is the Subsequent Medicare Annual Wellness Exam, performed 12 months or more after the Initial AWV or the last Subsequent AWV    I have reviewed the patient's medical history in detail and updated the computerized patient record. History     69M with h/o T2DM, HTN, Vit D deficiency, Prostate cancer who presents for follow up of chronic medical problems, but also needs AWV. His last visit was 12/2018. He is fasting for labs. Since his last visit, he denies any hospitalizations or worrisome symptoms. He continues to work. He is social distancing and avoiding high risk exposures. He denies CP, SOB or any other constitutional symptoms at this time. Patient Active Problem List   Diagnosis Code    DM type 2 (diabetes mellitus, type 2) [facility] E11.9    Mixed hyperlipidemia E78.2    HTN (hypertension) I10    Vitamin D deficiency E55.9    Cardiac murmur R01.1    Hypogonadism male E29.1    Other atopic dermatitis and related conditions L20.89    Malignant neoplasm of prostate ([facility] C61    Bradycardia R00.1    Aortic stenosis I35.0     Past Medical History:   Diagnosis Date    Colon polyps     Contact dermatitis and other eczema, due to unspecified cause     DM type 2 (diabetes mellitus, type 2) ([facility] 6/10/2009    Essential hypertension, benign     Hypertension     Malignant neoplasm of prostate ([facility] 9/25/2012    Mixed hyperlipidemia 6/10/2009    Mixed hyperlipidemia 6/10/2009    Prostate cancer ([facility] 11/2009    resection prostate      Past Surgical History:   Procedure Laterality Date    HX PROSTATECTOMY       Current Outpatient Medications   Medication Sig Dispense Refill    varicella-zoster recombinant, PF, (Shingrix, PF,) 50 mcg/0.5 mL susr injection 0.5 mL by IntraMUSCular route once for 1 dose.  0.5 mL 0    diph,pertuss,acel,,tetanus vac,PF, (ADACEL) 2 Lf-(2.5-5-3-5 mcg)-5Lf/0.5 mL syrg vaccine 0.5 mL by IntraMUSCular route once for 1 dose. Repeat in 3-4 months. 0.5 mL 1    glimepiride (AMARYL) 4 mg tablet TAKE 1 TABLET BY MOUTH EVERY MORNING 90 Tab 3    Janumet  mg per tablet TAKE 1 TABLET BY MOUTH TWICE A DAY WITH MEALS 60 Tab 2    lisinopriL (PRINIVIL, ZESTRIL) 20 mg tablet TAKE 1 TABLET BY MOUTH EVERY DAY 90 Tab 1    hydrocortisone (HYTONE) 2.5 % topical cream APPLY TO AFFECTED AREA TWO (2) TIMES A DAY. USE THIN LAYER 28.35 g 3    CIALIS 5 mg tablet Take 1 Tab by mouth daily as needed. 24 Tab 1    ezetimibe-simvastatin (VYTORIN 10/40) 10-40 mg per tablet Take 1 Tab by mouth nightly. 90 Tab 1    glucose blood VI test strips (ONETOUCH ULTRA TEST) strip Check BS daily 100 Strip 3    aspirin delayed-release 81 mg tablet Take 81 mg by mouth daily.  omega-3 fatty acids cap Take 1,000 mg by mouth daily. No Known Allergies    Family History   Problem Relation Age of Onset    Diabetes Mother     Hypertension Mother     Diabetes Father      Social History     Tobacco Use    Smoking status: Never Smoker    Smokeless tobacco: Never Used   Substance Use Topics    Alcohol use: No       Depression Risk Factor Screening:     3 most recent PHQ Screens 6/17/2020   Little interest or pleasure in doing things Not at all   Feeling down, depressed, irritable, or hopeless Not at all   Total Score PHQ 2 0       Alcohol Risk Factor Screening (MALE > 65): Do you average more 1 drink per night or more than 7 drinks a week: No    In the past three months have you have had more than 4 drinks containing alcohol on one occasion: No      Functional Ability and Level of Safety:   Hearing: Hearing is good. Activities of Daily Living: The home contains: no safety equipment. Patient does total self care     Ambulation: with no difficulty     Fall Risk:  Fall Risk Assessment, last 12 mths 6/17/2020   Able to walk? Yes   Fall in past 12 months?  No     Abuse Screen:  Patient is not abused       Cognitive Screening   Has your family/caregiver stated any concerns about your memory: no       Patient Care Team   Patient Care Team:  Mary Pollard MD as PCP - General (Pediatrics)  Mary Pollard MD as PCP - [facility] Empaneled Provider      /78 (BP 1 Location: Right arm, BP Patient Position: Sitting)   Pulse 60   Temp 98.4 °F (36.9 °C) (Oral)   Resp 16   Ht 6' 1\" (1.854 m)   Wt 224 lb (101.6 kg)   SpO2 98%   BMI 29.55 kg/m²   General: alert, cooperative, no distress   Mental  status: normal mood, behavior, speech, dress, motor activity, and thought processes, able to follow commands   HENT: NCAT   Neck: no visualized mass   Resp: no respiratory distress   Neuro: no gross deficits   Skin: no discoloration or lesions of concern on visible areas   Psychiatric: normal affect, consistent with stated mood, no evidence of hallucinations     Diabetic foot exam:      Left Foot:              Visual Exam: normal               Pulse DP: 2+ (normal)              Filament test: normal sensation               Vibratory sensation: Vibratory sensation: normal                       Right Foot:              Visual Exam: normal               Pulse DP: 2+ (normal)              Filament test: normal sensation               Vibratory sensation: Vibratory sensation: normal    Assessment/Plan   Education and counseling provided:  Are appropriate based on today's review and evaluation  End-of-Life planning (with patient's consent)  Pneumococcal Vaccine  Influenza Vaccine  Prostate cancer screening tests (PSA, covered annually)  Colorectal cancer screening tests  Cardiovascular screening blood test  Screening for glaucoma  Diabetes screening test    Diagnoses and all orders for this visit:    1. Medicare annual wellness visit, subsequent    2. Type 2 diabetes mellitus without complication, without long-term current use of insulin (HCC)  -     METABOLIC PANEL, COMPREHENSIVE;  Future  -     HEMOGLOBIN A1C WITH EAG; Future  -     MICROALBUMIN, UR, RAND W/ MICROALB/CREAT RATIO; Future  -     REFERRAL TO OPHTHALMOLOGY  -     glimepiride (AMARYL) 4 mg tablet; TAKE 1 TABLET BY MOUTH EVERY MORNING    3. Mixed hyperlipidemia  -     METABOLIC PANEL, COMPREHENSIVE; Future  -     LIPID PANEL; Future    4. Vitamin D deficiency  -     VITAMIN D, 25 HYDROXY; Future    5. Screening for prostate cancer  -     PSA, DIAGNOSTIC (PROSTATE SPECIFIC AG); Future    6. Screening for deficiency anemia  -     CBC WITH AUTOMATED DIFF; Future    7. En[facility] exam ([facility]  -      DIABETES FOOT EXAM    8. Screening for colon cancer  -     OCCULT BLOOD IMMUNOASSAY,DIAGNOSTIC; Future    9. Benign prostatic hyperplasia without lower urinary tract symptoms  -     PSA, DIAGNOSTIC (PROSTATE SPECIFIC AG); Future    10. Screening for glaucoma  -     REFERRAL TO OPHTHALMOLOGY    11. Malignant neoplasm of prostate ([facility]   Followed by urology. However, need PSA done today. Has not seen them in several years. 12. Encounter for immunization  -     PNEUMOCOCCAL POLYSACCHARIDE VACCINE, 23-VALENT, ADULT OR IMMUNOSUPPRESSED PT DOSE,  -     ADMIN PNEUMOCOCCAL VACCINE    Other orders  -     varicella-zoster recombinant, PF, (Shingrix, PF,) 50 mcg/0.5 mL susr injection; 0.5 mL by IntraMUSCular route once for 1 dose.  -     diph,pertuss,acel,,tetanus vac,PF, (ADACEL) 2 Lf-(2.5-5-3-5 mcg)-5Lf/0.5 mL syrg vaccine; 0.5 mL by IntraMUSCular route once for 1 dose. Repeat in 3-4 months.     Health Maintenance   Topic Date Due    DTaP/Tdap/Td series (1 - Tdap) Discussed/Ordered    Shingrix Vaccine Age 49> (1 of 2) Discussed/Ordered    Colonoscopy  Discussed/Gave Stool card    Eye Exam Retinal or Dilated  Discussed/Referral given    GLAUCOMA SCREENING Q2Y  Discussed/referral given    A1C test (Diabetic or Prediabetic)  ordered    MICROALBUMIN Q1  Ordered    Lipid Screen  Ordered    Foot Exam Q1  Done today    Influenza Age 5 to Adult 08/01/2020/Reminded patient    Medicare Yearly Exam  06/18/2021    Pneumococcal 65+ years (1 of 1 - PPSV23) 06/17/2025/Done in office today    Hepatitis C Screening  Completed     I have discussed the diagnosis with the patient and the intended treatment plan as seen in the above orders. The patient has received an after-visit summary and questions were answered concerning future plans. Asked to return should symptoms worsen or not improve with treatment. Any pending labs and studies will be relayed to patient when they become available. Pt verbalizes understanding of plan of care and denies further questions or concerns at this time. Follow-up and Dispositions    · Return in about 1 year (around 6/17/2021), or if symptoms worsen or fail to improve. Momo Mcfadden MD    Patient Instructions        Well Visit, Over 72: Care Instructions  Your Care Instructions     Physical exams can help you stay healthy. Your doctor has checked your overall health and may have suggested ways to take good care of yourself. He or she also may have recommended tests. At home, you can help prevent illness with healthy eating, regular exercise, and other steps. Follow-up care is a key part of your treatment and safety. Be sure to make and go to all appointments, and call your doctor if you are having problems. It's also a good idea to know your test results and keep a list of the medicines you take. How can you care for yourself at home? · Reach and stay at a healthy weight. This will lower your risk for many problems, such as obesity, diabetes, heart disease, and high blood pressure. · Get at least 30 minutes of exercise on most days of the week. Walking is a good choice. You also may want to do other activities, such as running, swimming, cycling, or playing tennis or team sports. · Do not smoke. Smoking can make health problems worse.  If you need help quitting, talk to your doctor about stop-smoking programs and medicines. These can increase your chances of quitting for good. · Protect your skin from too much sun. When you're outdoors from 10 a.m. to 4 p.m., stay in the shade or cover up with clothing and a hat with a wide brim. Wear sunglasses that block UV rays. Even when it's cloudy, put broad-spectrum sunscreen (SPF 30 or higher) on any exposed skin. · See a dentist one or two times a year for checkups and to have your teeth cleaned. · Wear a seat belt in the car. Follow your doctor's advice about when to have certain tests. These tests can spot problems early. For men and women  · Cholesterol. Your doctor will tell you how often to have this done based on your overall health and other things that can increase your risk for heart attack and stroke. · Blood pressure. Have your blood pressure checked during a routine doctor visit. Your doctor will tell you how often to check your blood pressure based on your age, your blood pressure results, and other factors. · Diabetes. Ask your doctor whether you should have tests for diabetes. · Vision. Experts recommend that you have yearly exams for glaucoma and other age-related eye problems. · Hearing. Tell your doctor if you notice any change in your hearing. You can have tests to find out how well you hear. · Colon cancer tests. Keep having colon cancer tests as your doctor recommends. You can have one of several types of tests. · Heart attack and stroke risk. At least every 4 to 6 years, you should have your risk for heart attack and stroke assessed. Your doctor uses factors such as your age, blood pressure, cholesterol, and whether you smoke or have diabetes to show what your risk for a heart attack or stroke is over the next 10 years. · Osteoporosis. Talk to your doctor about whether you should have a bone density test to find out whether you have thinning bones. Ask your doctor if you need to take a calcium plus vitamin D supplement.  You may be able to get enough calcium and vitamin D through your diet. For women  · Pap test and pelvic exam. You may no longer need a Pap test. Talk with your doctor about whether to stop or continue to have Pap tests. · Breast exam and mammogram. Ask how often you should have a mammogram, which is an X-ray of your breasts. A mammogram can spot breast cancer before it can be felt and when it is easiest to treat. · Thyroid disease. Talk to your doctor about whether to have your thyroid checked as part of a regular physical exam. Women have an increased chance of a thyroid problem. For men  · Prostate exam. Talk to your doctor about whether you should have a blood test (called a PSA test) for prostate cancer. Experts recommend that you discuss the benefits and risks of the test with your doctor before you decide whether to have this test. Some experts say that men ages 79 and older no longer need testing. · Abdominal aortic aneurysm. Ask your doctor whether you should have a test to check for an aneurysm. You may need a test if you ever smoked or if your parent, brother, sister, or child has had an aneurysm. When should you call for help? Watch closely for changes in your health, and be sure to contact your doctor if you have any problems or symptoms that concern you. Where can you learn more? Go to http://darrick-dyllan.info/  Enter [id number] in the search box to learn more about \"Well Visit, Over 65: Care Instructions. \"  Current as of: August 22, 2019               Content Version: 12.5  © 4438-8186 Healthwise, Incorporated. Care instructions adapted under license by [employer] (which disclaims liability or warranty for this information). If you have questions about a medical condition or this instruction, always ask your healthcare professional. [place] [address] any warranty or liability for your use of this information.          Well Visit, Over 72: Care Instructions  Your Care Instructions     Physical exams can help you stay healthy. Your doctor has checked your overall health and may have suggested ways to take good care of yourself. He or she also may have recommended tests. At home, you can help prevent illness with healthy eating, regular exercise, and other steps. Follow-up care is a key part of your treatment and safety. Be sure to make and go to all appointments, and call your doctor if you are having problems. It's also a good idea to know your test results and keep a list of the medicines you take. How can you care for yourself at home? · Reach and stay at a healthy weight. This will lower your risk for many problems, such as obesity, diabetes, heart disease, and high blood pressure. · Get at least 30 minutes of exercise on most days of the week. Walking is a good choice. You also may want to do other activities, such as running, swimming, cycling, or playing tennis or team sports. · Do not smoke. Smoking can make health problems worse. If you need help quitting, talk to your doctor about stop-smoking programs and medicines. These can increase your chances of quitting for good. · Protect your skin from too much sun. When you're outdoors from 10 a.m. to 4 p.m., stay in the shade or cover up with clothing and a hat with a wide brim. Wear sunglasses that block UV rays. Even when it's cloudy, put broad-spectrum sunscreen (SPF 30 or higher) on any exposed skin. · See a dentist one or two times a year for checkups and to have your teeth cleaned. · Wear a seat belt in the car. Follow your doctor's advice about when to have certain tests. These tests can spot problems early. For men and women  · Cholesterol. Your doctor will tell you how often to have this done based on your overall health and other things that can increase your risk for heart attack and stroke. · Blood pressure. Have your blood pressure checked during a routine doctor visit.  Your doctor will tell you how often to check your blood pressure based on your age, your blood pressure results, and other factors. · Diabetes. Ask your doctor whether you should have tests for diabetes. · Vision. Experts recommend that you have yearly exams for glaucoma and other age-related eye problems. · Hearing. Tell your doctor if you notice any change in your hearing. You can have tests to find out how well you hear. · Colon cancer tests. Keep having colon cancer tests as your doctor recommends. You can have one of several types of tests. · Heart attack and stroke risk. At least every 4 to 6 years, you should have your risk for heart attack and stroke assessed. Your doctor uses factors such as your age, blood pressure, cholesterol, and whether you smoke or have diabetes to show what your risk for a heart attack or stroke is over the next 10 years. · Osteoporosis. Talk to your doctor about whether you should have a bone density test to find out whether you have thinning bones. Ask your doctor if you need to take a calcium plus vitamin D supplement. You may be able to get enough calcium and vitamin D through your diet. For women  · Pap test and pelvic exam. You may no longer need a Pap test. Talk with your doctor about whether to stop or continue to have Pap tests. · Breast exam and mammogram. Ask how often you should have a mammogram, which is an X-ray of your breasts. A mammogram can spot breast cancer before it can be felt and when it is easiest to treat. · Thyroid disease. Talk to your doctor about whether to have your thyroid checked as part of a regular physical exam. Women have an increased chance of a thyroid problem. For men  · Prostate exam. Talk to your doctor about whether you should have a blood test (called a PSA test) for prostate cancer.  Experts recommend that you discuss the benefits and risks of the test with your doctor before you decide whether to have this test. Some experts say that men ages 79 and older no longer need testing. · Abdominal aortic aneurysm. Ask your doctor whether you should have a test to check for an aneurysm. You may need a test if you ever smoked or if your parent, brother, sister, or child has had an aneurysm. When should you call for help? Watch closely for changes in your health, and be sure to contact your doctor if you have any problems or symptoms that concern you. Where can you learn more? Go to http://darrick-dyllan.info/  Enter [id number] in the search box to learn more about \"Well Visit, Over 65: Care Instructions. \"  Current as of: August 22, 2019               Content Version: 12.5  © 0225-3369 [employer]. Care instructions adapted under license by [employer] (which disclaims liability or warranty for this information). If you have questions about a medical condition or this instruction, always ask your healthcare professional. [place] [address] any warranty or liability for your use of this information. Diabetes Foot Health: Care Instructions  Your Care Instructions     When you have diabetes, your feet need extra care and attention. Diabetes can damage the nerve endings and blood vessels in your feet, making you less likely to notice when your feet are injured. Diabetes also limits your body's ability to fight infection and get blood to areas that need it. If you get a minor foot injury, it could become an ulcer or a serious infection. With good foot care, you can prevent most of these problems. Caring for your feet can be quick and easy. Most of the care can be done when you are bathing or getting ready for bed. Follow-up care is a key part of your treatment and safety. Be sure to make and go to all appointments, and call your doctor if you are having problems. It's also a good idea to know your test results and keep a list of the medicines you take. How can you care for yourself at home?   · Keep your blood sugar close to normal by watching what and how much you eat, monitoring blood sugar, taking medicines if prescribed, and getting regular exercise. · Do not smoke. Smoking affects blood flow and can make foot problems worse. If you need help quitting, talk to your doctor about stop-smoking programs and medicines. These can increase your chances of quitting for good. · Eat a diet that is low in fats. High fat intake can cause fat to build up in your blood vessels and decrease blood flow. · Inspect your feet daily for blisters, cuts, cracks, or sores. If you cannot see well, use a mirror or have someone help you. · Take care of your feet:  ? Wash your feet every day. Use warm (not hot) water. Check the water temperature with your wrists or other part of your body, not your feet. ? Dry your feet well. Pat them dry. Do not rub the skin on your feet too hard. Dry well between your toes. If the skin on your feet stays moist, bacteria or a fungus can grow, which can lead to infection. ? Keep your skin soft. Use moisturizing skin cream to keep the skin on your feet soft and prevent calluses and cracks. But do not put the cream between your toes, and stop using any cream that causes a rash. ? Clean underneath your toenails carefully. Do not use a sharp object to clean underneath your toenails. Use the blunt end of a nail file or other rounded tool. ? Trim and file your toenails straight across to prevent ingrown toenails. Use a nail clipper, not scissors. Use an emery board to smooth the edges. · Change socks daily. Socks without seams are best, because seams often rub the feet. You can find socks for people with diabetes from specialty catalogs. · Look inside your shoes every day for things like gravel or torn linings, which could cause blisters or sores. · Buy shoes that fit well:  ? Look for shoes that have plenty of space around the toes. This helps prevent bunions and blisters.   ? Try on shoes while wearing the kind of socks you will usually wear with the shoes. ? Avoid plastic shoes. They may rub your feet and cause blisters. Good shoes should be made of materials that are flexible and breathable, such as leather or cloth. ? Break in new shoes slowly by wearing them for no more than an hour a day for several days. Take extra time to check your feet for red areas, blisters, or other problems after you wear new shoes. · Do not go barefoot. Do not wear sandals, and do not wear shoes with very thin soles. Thin soles are easy to puncture. They also do not protect your feet from hot pavement or cold weather. · Have your doctor check your feet during each visit. If you have a foot problem, see your doctor. Do not try to treat an early foot problem at home. Home remedies or treatments that you can buy without a prescription (such as corn removers) can be harmful. · Always get early treatment for foot problems. A minor irritation can lead to a major problem if not properly cared for early. When should you call for help? Call your doctor now or seek immediate medical care if:  · You have a foot sore, an ulcer or break in the skin that is not healing after 4 days, bleeding corns or calluses, or an ingrown toenail. · You have blue or black areas, which can mean bruising or blood flow problems. · You have peeling skin or tiny blisters between your toes or cracking or oozing of the skin. · You have a fever for more than 24 hours and a foot sore. · You have new numbness or tingling in your feet that does not go away after you move your feet or change positions. · You have unexplained or unusual swelling of the foot or ankle. Watch closely for changes in your health, and be sure to contact your doctor if:  · You cannot do proper foot care. Where can you learn more? Go to http://darrick-dyllan.info/  Enter F517 in the search box to learn more about \"Diabetes Foot Health: Care Instructions. \"  Current as of: December 20, 2019               Content Version: 12.5  © 8211-3656 Healthwise, Incorporated. Care instructions adapted under license by [employer] (which disclaims liability or warranty for this information). If you have questions about a medical condition or this instruction, always ask your healthcare professional. [address] any warranty or liability for your use of this information.

## 2020-06-21 RX ORDER — ATORVASTATIN CALCIUM 40 MG/1
40 TABLET, FILM COATED ORAL DAILY
Qty: 90 TAB | Refills: 1 | Status: SHIPPED | OUTPATIENT
Start: 2020-06-21 | End: 2020-12-20 | Stop reason: SDUPTHER

## 2020-09-04 RX ORDER — SITAGLIPTIN AND METFORMIN HYDROCHLORIDE 500; 50 MG/1; MG/1
TABLET, FILM COATED ORAL
Qty: 180 TAB | Refills: 0 | Status: SHIPPED | OUTPATIENT
Start: 2020-09-04 | End: 2020-12-23

## 2020-09-16 RX ORDER — HYDROCORTISONE 25 MG/G
CREAM TOPICAL 2 TIMES DAILY
Qty: 28.35 G | Refills: 3 | Status: SHIPPED | OUTPATIENT
Start: 2020-09-16 | End: 2021-10-12

## 2020-10-30 DIAGNOSIS — I10 ESSENTIAL HYPERTENSION WITH GOAL BLOOD PRESSURE LESS THAN 140/90: ICD-10-CM

## 2020-10-30 RX ORDER — LISINOPRIL 20 MG/1
TABLET ORAL
Qty: 90 TAB | Refills: 1 | Status: SHIPPED | OUTPATIENT
Start: 2020-10-30 | End: 2021-05-27

## 2020-12-20 RX ORDER — ATORVASTATIN CALCIUM 40 MG/1
40 TABLET, FILM COATED ORAL DAILY
Qty: 90 TAB | Refills: 1 | Status: SHIPPED | OUTPATIENT
Start: 2020-12-20 | End: 2021-06-12

## 2020-12-23 RX ORDER — SITAGLIPTIN AND METFORMIN HYDROCHLORIDE 500; 50 MG/1; MG/1
TABLET, FILM COATED ORAL
Qty: 180 TAB | Refills: 0 | Status: SHIPPED | OUTPATIENT
Start: 2020-12-23 | End: 2021-03-16

## 2021-03-16 DIAGNOSIS — E11.9 TYPE 2 DIABETES MELLITUS WITHOUT COMPLICATION, WITHOUT LONG-TERM CURRENT USE OF INSULIN (HCC): ICD-10-CM

## 2021-03-16 RX ORDER — GLIMEPIRIDE 4 MG/1
TABLET ORAL
Qty: 90 TAB | Refills: 3 | Status: SHIPPED | OUTPATIENT
Start: 2021-03-16 | End: 2021-10-14 | Stop reason: SDUPTHER

## 2021-03-16 RX ORDER — SITAGLIPTIN AND METFORMIN HYDROCHLORIDE 500; 50 MG/1; MG/1
TABLET, FILM COATED ORAL
Qty: 180 TAB | Refills: 0 | Status: SHIPPED | OUTPATIENT
Start: 2021-03-16 | End: 2021-05-27

## 2021-05-25 ENCOUNTER — PATIENT MESSAGE (OUTPATIENT)
Dept: FAMILY MEDICINE CLINIC | Age: 71
End: 2021-05-25

## 2021-05-27 DIAGNOSIS — I10 ESSENTIAL HYPERTENSION WITH GOAL BLOOD PRESSURE LESS THAN 140/90: ICD-10-CM

## 2021-05-27 RX ORDER — SITAGLIPTIN AND METFORMIN HYDROCHLORIDE 500; 50 MG/1; MG/1
TABLET, FILM COATED ORAL
Qty: 180 TABLET | Refills: 0 | Status: SHIPPED | OUTPATIENT
Start: 2021-05-27 | End: 2021-08-26 | Stop reason: SDUPTHER

## 2021-05-27 RX ORDER — LISINOPRIL 20 MG/1
TABLET ORAL
Qty: 90 TABLET | Refills: 1 | Status: SHIPPED | OUTPATIENT
Start: 2021-05-27 | End: 2021-11-29 | Stop reason: SDUPTHER

## 2021-06-12 RX ORDER — ATORVASTATIN CALCIUM 40 MG/1
40 TABLET, FILM COATED ORAL DAILY
Qty: 90 TABLET | Refills: 1 | Status: SHIPPED | OUTPATIENT
Start: 2021-06-12 | End: 2021-09-10

## 2021-08-26 RX ORDER — SITAGLIPTIN AND METFORMIN HYDROCHLORIDE 500; 50 MG/1; MG/1
TABLET, FILM COATED ORAL
Qty: 180 TABLET | Refills: 0 | Status: SHIPPED | OUTPATIENT
Start: 2021-08-26 | End: 2021-11-26 | Stop reason: SDUPTHER

## 2021-10-09 ENCOUNTER — HOSPITAL ENCOUNTER (EMERGENCY)
Age: 71
Discharge: HOME | End: 2021-10-09
Attending: EMERGENCY MEDICINE
Payer: MEDICARE

## 2021-10-09 VITALS
SYSTOLIC BLOOD PRESSURE: 161 MMHG | HEIGHT: 73 IN | DIASTOLIC BLOOD PRESSURE: 80 MMHG | RESPIRATION RATE: 16 BRPM | OXYGEN SATURATION: 97 % | WEIGHT: 212.96 LBS | BODY MASS INDEX: 28.22 KG/M2 | HEART RATE: 87 BPM | TEMPERATURE: 101.8 F

## 2021-10-09 DIAGNOSIS — Z20.822 SUSPECTED COVID-19 VIRUS INFECTION: Primary | ICD-10-CM

## 2021-10-09 LAB — SARS-COV-2, COV2: NORMAL

## 2021-10-09 PROCEDURE — U0005 INFEC AGEN DETEC AMPLI PROBE: HCPCS

## 2021-10-09 PROCEDURE — 99283 EMERGENCY DEPT VISIT LOW MDM: CPT

## 2021-10-09 PROCEDURE — 74011250637 HC RX REV CODE- 250/637: Performed by: EMERGENCY MEDICINE

## 2021-10-09 RX ORDER — SIMVASTATIN 40 MG/1
40 TABLET, FILM COATED ORAL
COMMUNITY
End: 2021-10-09

## 2021-10-09 RX ORDER — ACETAMINOPHEN 500 MG
1000 TABLET ORAL ONCE
Status: COMPLETED | OUTPATIENT
Start: 2021-10-09 | End: 2021-10-09

## 2021-10-09 RX ADMIN — ACETAMINOPHEN 1000 MG: 500 TABLET ORAL at 10:06

## 2021-10-09 NOTE — ED TRIAGE NOTES
Pt ambulates to treatment area he states that for a week now he has been feeling sick, he was exposed to a coworker that had Covid. Pt has had headache, abdominal pain, chills, body aches with an intermittent cough.   He is here to be tested for covid he has not been taking anything for his symptoms

## 2021-10-09 NOTE — DISCHARGE INSTRUCTIONS
Thank you for allowing us to provide you with medical care today. We realize that you have many choices for your emergency care needs. We thank you for choosing [facility]. Please choose us in the future for any continued health care needs. We hope we addressed all of your medical concerns. We strive to provide excellent quality care in the Emergency Department. Anything less than excellent does not meet our expectations. The exam and treatment you received in the Emergency Department were for an emergent problem and are not intended as complete care. It is important that you follow up with a doctor, nurse practitioner, or physician's assistant for ongoing care. If your symptoms worsen or you do not improve as expected and you are unable to reach your usual health care provider, you should return to the Emergency Department. We are available 24 hours a day. Take this sheet with you when you go to your follow-up visit. If you have any problem arranging the follow-up visit, contact the Emergency Department immediately. Make an appointment your family doctor for follow up of this visit. Return to the ER if you are unable to be seen in a timely manner.

## 2021-10-09 NOTE — ED PROVIDER NOTES
62-year-old male presents with concern for Covid. He reports having a funny taste in his mouth and having some congestion. He denies shortness of breath or chest pain. He has not been vaccinated. He has been around a coworker who is positive for Covid recently. Past Medical History:   Diagnosis Date    Colon polyps     Contact dermatitis and other eczema, due to unspecified cause     DM type 2 (diabetes mellitus, type 2) ([facility] 6/10/2009    Essential hypertension, benign     Hypertension     Malignant neoplasm of prostate ([facility] 9/25/2012    Mixed hyperlipidemia 6/10/2009    Mixed hyperlipidemia 6/10/2009    Prostate cancer ([facility] 11/2009    resection prostate       Past Surgical History:   Procedure Laterality Date    HX PROSTATECTOMY           Family History:   Problem Relation Age of Onset    Diabetes Mother     Hypertension Mother     Diabetes Father        Social History     Socioeconomic History    Marital status:      Spouse name: Not on file    Number of children: Not on file    Years of education: Not on file    Highest education level: Not on file   Occupational History    Not on file   Tobacco Use    Smoking status: Never Smoker    Smokeless tobacco: Never Used   Substance and Sexual Activity    Alcohol use: No    Drug use: No    Sexual activity: Yes     Partners: Female     Birth control/protection: None   Other Topics Concern    Not on file   Social History Narrative    Not on file     Social Determinants of Health     Financial Resource Strain:     Difficulty of Paying Living Expenses:    Food Insecurity:     Worried About Running Out of Food in the Last Year:     Ran Out of Food in the Last Year:    Transportation Needs:     Lack of Transportation (Medical):      Lack of Transportation (Non-Medical):    Physical Activity:     Days of Exercise per Week:     Minutes of Exercise per Session:    Stress:     Feeling of Stress :    Social Connections:     Frequency of Communication with Friends and Family:     Frequency of Social Gatherings with Friends and Family:     Attends Sabianist Services:     Active Member of Clubs or Organizations:     Attends Club or Organization Meetings:     Marital Status:    Intimate Partner Violence:     Fear of Current or Ex-Partner:     Emotionally Abused:     Physically Abused:     Sexually Abused: ALLERGIES: Patient has no known allergies. Review of Systems   Constitutional: Positive for fatigue. Negative for fever. HENT: Negative for rhinorrhea. Respiratory: Negative for cough. Cardiovascular: Negative for chest pain. Gastrointestinal: Negative for abdominal pain. Genitourinary: Negative for dysuria. Musculoskeletal: Negative for back pain. Skin: Negative for wound. Neurological: Negative for headaches. Psychiatric/Behavioral: Negative for confusion. Vitals:    10/09/21 0950   BP: (!) 161/80   Pulse: 87   Resp: 16   Temp: (!) 101.8 °F (38.8 °C)   SpO2: 97%   Weight: 96.6 kg (212 lb 15.4 oz)   Height: 6' 1\" (1.854 m)            Physical Exam  Vitals and nursing note reviewed. Constitutional:       General: He is not in acute distress. Appearance: Normal appearance. He is not ill-appearing, toxic-appearing or diaphoretic. HENT:      Head: Normocephalic. Eyes:      Extraocular Movements: Extraocular movements intact. Cardiovascular:      Rate and Rhythm: Normal rate. Pulses: Normal pulses. Pulmonary:      Effort: Pulmonary effort is normal. No respiratory distress. Breath sounds: Normal breath sounds. Abdominal:      General: There is no distension. Musculoskeletal:         General: Normal range of motion. Cervical back: Normal range of motion. Skin:     General: Skin is dry. Capillary Refill: Capillary refill takes less than 2 seconds. Neurological:      Mental Status: He is alert and oriented to person, place, and time.    Psychiatric: Mood and Affect: Mood normal.          MDM  Number of Diagnoses or Management Options  Suspected COVID-19 virus infection  Diagnosis management comments: 72-year-old male presents with concern for Covid. He is febrile and was given Tylenol here in the ED. His work of breathing and oxygen saturations are normal.  Covid test pending. Discussed my clinical impression(s), any labs and/or radiology results with the patient. I answered any questions and addressed any concerns. Discussed the importance of following up with their primary care physician and/or specialist(s). Discussed signs or symptoms that would warrant return back to the ER for further evaluation. The patient is agreeable with discharge.          Procedures

## 2021-10-09 NOTE — Clinical Note
[address] EMERGENCY DEPT  [address] [facility] [address]  [phone]    Work/School Note    Date: 10/9/2021     To Whom It May concern:    Amanda Ashley was evaulated by the following provider(s):  Attending Provider: Maddie Bolton MD.   [address] virus is suspected. Per the CDC guidelines we recommend home isolation until the following conditions are all met:    1. At least 10 days have passed since symptoms first appeared and  2. At least 24 hours have passed since last fever without the use of fever-reducing medications and  3.  Symptoms (e.g., cough, shortness of breath) have improved    Sincerely,          Adarsh Morley MD

## 2021-10-11 ENCOUNTER — PATIENT OUTREACH (OUTPATIENT)
Dept: CASE MANAGEMENT | Age: 71
End: 2021-10-11

## 2021-10-11 LAB
SARS-COV-2, XPLCVT: DETECTED
SOURCE, COVRS: ABNORMAL

## 2021-10-11 NOTE — ED NOTES
I was notified regarding the patient's test for COVID-19 infection. I tried to reach the patient but unsuccessfully and left a voicemail for the patient asking him to call back regarding his test results.

## 2021-10-12 ENCOUNTER — APPOINTMENT (OUTPATIENT)
Dept: GENERAL RADIOLOGY | Age: 71
End: 2021-10-12
Attending: EMERGENCY MEDICINE
Payer: MEDICARE

## 2021-10-12 ENCOUNTER — HOSPITAL ENCOUNTER (OUTPATIENT)
Age: 71
Setting detail: OBSERVATION
Discharge: HOME | End: 2021-10-12
Attending: STUDENT IN AN ORGANIZED HEALTH CARE EDUCATION/TRAINING PROGRAM | Admitting: FAMILY MEDICINE
Payer: MEDICARE

## 2021-10-12 ENCOUNTER — APPOINTMENT (OUTPATIENT)
Dept: CT IMAGING | Age: 71
End: 2021-10-12
Attending: STUDENT IN AN ORGANIZED HEALTH CARE EDUCATION/TRAINING PROGRAM
Payer: MEDICARE

## 2021-10-12 VITALS
WEIGHT: 224 LBS | TEMPERATURE: 99.9 F | RESPIRATION RATE: 25 BRPM | OXYGEN SATURATION: 94 % | SYSTOLIC BLOOD PRESSURE: 122 MMHG | DIASTOLIC BLOOD PRESSURE: 76 MMHG | HEART RATE: 95 BPM | BODY MASS INDEX: 29.55 KG/M2

## 2021-10-12 DIAGNOSIS — R00.0 TACHYCARDIA: ICD-10-CM

## 2021-10-12 DIAGNOSIS — R09.02 HYPOXIA: ICD-10-CM

## 2021-10-12 DIAGNOSIS — U07.1 COVID-19 VIRUS INFECTION: Primary | ICD-10-CM

## 2021-10-12 DIAGNOSIS — E86.0 DEHYDRATION: ICD-10-CM

## 2021-10-12 DIAGNOSIS — E87.20 LACTIC ACIDOSIS: ICD-10-CM

## 2021-10-12 PROBLEM — E27.8 ADRENAL NODULE (HCC): Status: ACTIVE | Noted: 2021-10-12

## 2021-10-12 PROBLEM — R79.89 LACTIC ACID BLOOD INCREASED: Status: ACTIVE | Noted: 2021-10-12

## 2021-10-12 LAB
25(OH)D3 SERPL-MCNC: 38.5 NG/ML (ref 30–100)
ALBUMIN SERPL-MCNC: 3.4 G/DL (ref 3.5–5)
ALBUMIN/GLOB SERPL: 0.8 {RATIO} (ref 1.1–2.2)
ALP SERPL-CCNC: 52 U/L (ref 45–117)
ALT SERPL-CCNC: 39 U/L (ref 12–78)
AMORPH CRY URNS QL MICRO: ABNORMAL
ANION GAP SERPL CALC-SCNC: 7 MMOL/L (ref 5–15)
APPEARANCE UR: ABNORMAL
AST SERPL-CCNC: 43 U/L (ref 15–37)
BACTERIA URNS QL MICRO: NEGATIVE /HPF
BASOPHILS # BLD: 0 K/UL (ref 0–0.1)
BASOPHILS NFR BLD: 0 % (ref 0–1)
BILIRUB SERPL-MCNC: 0.6 MG/DL (ref 0.2–1)
BILIRUB UR QL CFM: NEGATIVE
BUN SERPL-MCNC: 22 MG/DL (ref 6–20)
BUN/CREAT SERPL: 18 (ref 12–20)
CALCIUM SERPL-MCNC: 8.7 MG/DL (ref 8.5–10.1)
CHLORIDE SERPL-SCNC: 101 MMOL/L (ref 97–108)
CHOLEST SERPL-MCNC: 98 MG/DL
CO2 SERPL-SCNC: 25 MMOL/L (ref 21–32)
COLOR UR: ABNORMAL
COMMENT, HOLDF: NORMAL
CREAT SERPL-MCNC: 1.23 MG/DL (ref 0.7–1.3)
CRP SERPL-MCNC: 7.09 MG/DL (ref 0–0.6)
D DIMER PPP FEU-MCNC: 1.16 MG/L FEU (ref 0–0.65)
DIFFERENTIAL METHOD BLD: ABNORMAL
EOSINOPHIL # BLD: 0 K/UL (ref 0–0.4)
EOSINOPHIL NFR BLD: 0 % (ref 0–7)
EPITH CASTS URNS QL MICRO: ABNORMAL /LPF
ERYTHROCYTE [DISTWIDTH] IN BLOOD BY AUTOMATED COUNT: 12.8 % (ref 11.5–14.5)
EST. AVERAGE GLUCOSE BLD GHB EST-MCNC: 128 MG/DL
FERRITIN SERPL-MCNC: 652 NG/ML (ref 26–388)
GLOBULIN SER CALC-MCNC: 4.5 G/DL (ref 2–4)
GLUCOSE SERPL-MCNC: 186 MG/DL (ref 65–100)
GLUCOSE UR STRIP.AUTO-MCNC: NEGATIVE MG/DL
GRAN CASTS URNS QL MICRO: ABNORMAL /LPF
HBA1C MFR BLD: 6.1 % (ref 4–5.6)
HCT VFR BLD AUTO: 46.3 % (ref 36.6–50.3)
HDLC SERPL-MCNC: 43 MG/DL
HDLC SERPL: 2.3 {RATIO} (ref 0–5)
HGB BLD-MCNC: 15.7 G/DL (ref 12.1–17)
HGB UR QL STRIP: ABNORMAL
HYALINE CASTS URNS QL MICRO: ABNORMAL /LPF (ref 0–5)
IMM GRANULOCYTES # BLD AUTO: 0 K/UL
IMM GRANULOCYTES NFR BLD AUTO: 0 %
KETONES UR QL STRIP.AUTO: 15 MG/DL
LACTATE SERPL-SCNC: 1.9 MMOL/L (ref 0.4–2)
LACTATE SERPL-SCNC: 2.4 MMOL/L (ref 0.4–2)
LDH SERPL L TO P-CCNC: 474 U/L (ref 85–241)
LDLC SERPL CALC-MCNC: 37.4 MG/DL (ref 0–100)
LEUKOCYTE ESTERASE UR QL STRIP.AUTO: NEGATIVE
LYMPHOCYTES # BLD: 0.6 K/UL (ref 0.8–3.5)
LYMPHOCYTES NFR BLD: 6 % (ref 12–49)
MAGNESIUM SERPL-MCNC: 2.5 MG/DL (ref 1.6–2.4)
MCH RBC QN AUTO: 32.4 PG (ref 26–34)
MCHC RBC AUTO-ENTMCNC: 33.9 G/DL (ref 30–36.5)
MCV RBC AUTO: 95.5 FL (ref 80–99)
MONOCYTES # BLD: 0.7 K/UL (ref 0–1)
MONOCYTES NFR BLD: 7 % (ref 5–13)
NEUTS BAND NFR BLD MANUAL: 13 % (ref 0–6)
NEUTS SEG # BLD: 8.9 K/UL (ref 1.8–8)
NEUTS SEG NFR BLD: 74 % (ref 32–75)
NITRITE UR QL STRIP.AUTO: NEGATIVE
NRBC # BLD: 0 K/UL (ref 0–0.01)
NRBC BLD-RTO: 0 PER 100 WBC
PH UR STRIP: 5.5 [PH] (ref 5–8)
PLATELET # BLD AUTO: 289 K/UL (ref 150–400)
PLATELET COMMENTS,PCOM: ABNORMAL
PMV BLD AUTO: 9.6 FL (ref 8.9–12.9)
POTASSIUM SERPL-SCNC: 4.8 MMOL/L (ref 3.5–5.1)
PROCALCITONIN SERPL-MCNC: 0.14 NG/ML
PROT SERPL-MCNC: 7.9 G/DL (ref 6.4–8.2)
PROT UR STRIP-MCNC: 300 MG/DL
RBC # BLD AUTO: 4.85 M/UL (ref 4.1–5.7)
RBC #/AREA URNS HPF: ABNORMAL /HPF (ref 0–5)
RBC MORPH BLD: ABNORMAL
SAMPLES BEING HELD,HOLD: NORMAL
SODIUM SERPL-SCNC: 133 MMOL/L (ref 136–145)
SP GR UR REFRACTOMETRY: >1.03 (ref 1–1.03)
TRIGL SERPL-MCNC: 88 MG/DL (ref ?–150)
TROPONIN-HIGH SENSITIVITY: 9 NG/L (ref 0–76)
UA: UC IF INDICATED,UAUC: ABNORMAL
UROBILINOGEN UR QL STRIP.AUTO: 0.2 EU/DL (ref 0.2–1)
VLDLC SERPL CALC-MCNC: 17.6 MG/DL
WBC # BLD AUTO: 10.2 K/UL (ref 4.1–11.1)
WBC URNS QL MICRO: ABNORMAL /HPF (ref 0–4)

## 2021-10-12 PROCEDURE — 65270000029 HC RM PRIVATE

## 2021-10-12 PROCEDURE — 82728 ASSAY OF FERRITIN: CPT

## 2021-10-12 PROCEDURE — 74011000636 HC RX REV CODE- 636: Performed by: RADIOLOGY

## 2021-10-12 PROCEDURE — 71275 CT ANGIOGRAPHY CHEST: CPT

## 2021-10-12 PROCEDURE — 80053 COMPREHEN METABOLIC PANEL: CPT

## 2021-10-12 PROCEDURE — 83036 HEMOGLOBIN GLYCOSYLATED A1C: CPT

## 2021-10-12 PROCEDURE — 93005 ELECTROCARDIOGRAM TRACING: CPT

## 2021-10-12 PROCEDURE — 86140 C-REACTIVE PROTEIN: CPT

## 2021-10-12 PROCEDURE — 77010033678 HC OXYGEN DAILY

## 2021-10-12 PROCEDURE — 99235 HOSP IP/OBS SAME DATE MOD 70: CPT | Performed by: FAMILY MEDICINE

## 2021-10-12 PROCEDURE — 84484 ASSAY OF TROPONIN QUANT: CPT

## 2021-10-12 PROCEDURE — 74011250636 HC RX REV CODE- 250/636: Performed by: EMERGENCY MEDICINE

## 2021-10-12 PROCEDURE — 83605 ASSAY OF LACTIC ACID: CPT

## 2021-10-12 PROCEDURE — 94618 PULMONARY STRESS TESTING: CPT

## 2021-10-12 PROCEDURE — 99218 HC RM OBSERVATION: CPT

## 2021-10-12 PROCEDURE — 74011250637 HC RX REV CODE- 250/637: Performed by: EMERGENCY MEDICINE

## 2021-10-12 PROCEDURE — 81001 URINALYSIS AUTO W/SCOPE: CPT

## 2021-10-12 PROCEDURE — 71045 X-RAY EXAM CHEST 1 VIEW: CPT

## 2021-10-12 PROCEDURE — 85025 COMPLETE CBC W/AUTO DIFF WBC: CPT

## 2021-10-12 PROCEDURE — 82306 VITAMIN D 25 HYDROXY: CPT

## 2021-10-12 PROCEDURE — 36415 COLL VENOUS BLD VENIPUNCTURE: CPT

## 2021-10-12 PROCEDURE — 83615 LACTATE (LD) (LDH) ENZYME: CPT

## 2021-10-12 PROCEDURE — 96374 THER/PROPH/DIAG INJ IV PUSH: CPT

## 2021-10-12 PROCEDURE — 80061 LIPID PANEL: CPT

## 2021-10-12 PROCEDURE — 99285 EMERGENCY DEPT VISIT HI MDM: CPT

## 2021-10-12 PROCEDURE — 96375 TX/PRO/DX INJ NEW DRUG ADDON: CPT

## 2021-10-12 PROCEDURE — 87040 BLOOD CULTURE FOR BACTERIA: CPT

## 2021-10-12 PROCEDURE — 74011250636 HC RX REV CODE- 250/636: Performed by: STUDENT IN AN ORGANIZED HEALTH CARE EDUCATION/TRAINING PROGRAM

## 2021-10-12 PROCEDURE — 94760 N-INVAS EAR/PLS OXIMETRY 1: CPT

## 2021-10-12 PROCEDURE — 83735 ASSAY OF MAGNESIUM: CPT

## 2021-10-12 PROCEDURE — 96360 HYDRATION IV INFUSION INIT: CPT

## 2021-10-12 PROCEDURE — 85379 FIBRIN DEGRADATION QUANT: CPT

## 2021-10-12 PROCEDURE — 96361 HYDRATE IV INFUSION ADD-ON: CPT

## 2021-10-12 PROCEDURE — 84145 PROCALCITONIN (PCT): CPT

## 2021-10-12 PROCEDURE — 74011250637 HC RX REV CODE- 250/637: Performed by: STUDENT IN AN ORGANIZED HEALTH CARE EDUCATION/TRAINING PROGRAM

## 2021-10-12 RX ORDER — ATORVASTATIN CALCIUM 40 MG/1
40 TABLET, FILM COATED ORAL
COMMUNITY
End: 2021-12-17 | Stop reason: SDUPTHER

## 2021-10-12 RX ORDER — ACETAMINOPHEN 325 MG/1
650 TABLET ORAL
Status: DISCONTINUED | OUTPATIENT
Start: 2021-10-12 | End: 2021-10-12 | Stop reason: HOSPADM

## 2021-10-12 RX ORDER — ATORVASTATIN CALCIUM 20 MG/1
20 TABLET, FILM COATED ORAL DAILY
Status: DISCONTINUED | OUTPATIENT
Start: 2021-10-12 | End: 2021-10-12 | Stop reason: HOSPADM

## 2021-10-12 RX ORDER — ZINC GLUCONATE 50 MG
50 TABLET ORAL DAILY
Qty: 10 TABLET | Refills: 0 | Status: SHIPPED | OUTPATIENT
Start: 2021-10-13

## 2021-10-12 RX ORDER — ASCORBIC ACID 500 MG
500 TABLET ORAL 2 TIMES DAILY
Status: DISCONTINUED | OUTPATIENT
Start: 2021-10-12 | End: 2021-10-12 | Stop reason: HOSPADM

## 2021-10-12 RX ORDER — ACETAMINOPHEN 650 MG/1
650 SUPPOSITORY RECTAL
Status: DISCONTINUED | OUTPATIENT
Start: 2021-10-12 | End: 2021-10-12 | Stop reason: HOSPADM

## 2021-10-12 RX ORDER — ZINC GLUCONATE 50 MG
1 TABLET ORAL DAILY
Status: DISCONTINUED | OUTPATIENT
Start: 2021-10-12 | End: 2021-10-12 | Stop reason: HOSPADM

## 2021-10-12 RX ORDER — DEXAMETHASONE 6 MG/1
6 TABLET ORAL
Qty: 9 TABLET | Refills: 0 | Status: SHIPPED | OUTPATIENT
Start: 2021-10-13

## 2021-10-12 RX ORDER — DEXAMETHASONE SODIUM PHOSPHATE 4 MG/ML
6 INJECTION, SOLUTION INTRA-ARTICULAR; INTRALESIONAL; INTRAMUSCULAR; INTRAVENOUS; SOFT TISSUE EVERY 24 HOURS
Status: DISCONTINUED | OUTPATIENT
Start: 2021-10-12 | End: 2021-10-12 | Stop reason: HOSPADM

## 2021-10-12 RX ORDER — FAMOTIDINE 20 MG/1
20 TABLET, FILM COATED ORAL 2 TIMES DAILY
Qty: 20 TABLET | Refills: 0 | Status: SHIPPED | OUTPATIENT
Start: 2021-10-12

## 2021-10-12 RX ORDER — HYDROCORTISONE 25 MG/G
CREAM TOPICAL
COMMUNITY
End: 2021-12-22

## 2021-10-12 RX ORDER — ASCORBIC ACID 500 MG
500 TABLET ORAL 2 TIMES DAILY
Qty: 20 TABLET | Refills: 0 | Status: SHIPPED | OUTPATIENT
Start: 2021-10-12

## 2021-10-12 RX ORDER — SODIUM CHLORIDE 0.9 % (FLUSH) 0.9 %
5-10 SYRINGE (ML) INJECTION AS NEEDED
Status: DISCONTINUED | OUTPATIENT
Start: 2021-10-12 | End: 2021-10-12 | Stop reason: HOSPADM

## 2021-10-12 RX ADMIN — SODIUM CHLORIDE 1000 ML: 9 INJECTION, SOLUTION INTRAVENOUS at 08:00

## 2021-10-12 RX ADMIN — ACETAMINOPHEN 650 MG: 325 TABLET ORAL at 10:44

## 2021-10-12 RX ADMIN — Medication 50 MG: at 11:37

## 2021-10-12 RX ADMIN — OXYCODONE HYDROCHLORIDE AND ACETAMINOPHEN 500 MG: 500 TABLET ORAL at 11:37

## 2021-10-12 RX ADMIN — FAMOTIDINE 20 MG: 10 INJECTION, SOLUTION INTRAVENOUS at 11:38

## 2021-10-12 RX ADMIN — ATORVASTATIN CALCIUM 20 MG: 20 TABLET, FILM COATED ORAL at 11:37

## 2021-10-12 RX ADMIN — IOPAMIDOL 80 ML: 755 INJECTION, SOLUTION INTRAVENOUS at 11:03

## 2021-10-12 RX ADMIN — DEXAMETHASONE SODIUM PHOSPHATE 6 MG: 4 INJECTION, SOLUTION INTRAMUSCULAR; INTRAVENOUS at 11:42

## 2021-10-12 NOTE — DISCHARGE INSTRUCTIONS
HOME DISCHARGE INSTRUCTIONS    Alfonzo Gutierrezp / [id number] : 1950    Admission date: 10/12/2021 Discharge date: 10/12/2021     Please bring this form with you to show your care provider at your follow-up appointment. Primary care provider:  Edward Orozco MD    Discharging provider:  Mervin Vazquez MD  - Family Medicine Resident  Tiarra Hardy MD - Attending, Family Medicine     You have been admitted to the hospital with the following diagnoses:    ACUTE DIAGNOSES:  · COVID-19 virus infection [U07.1]  - Elevated Lactic Acid     . . . . . . . . . . . . . . . . . . . . . . . . . . . . . . . . . . . . . . . . . . . . . . . . . . . . . . . . . . . . . . . . . . . . . . . [place] [place] FOLLOW-UP CARE RECOMMENDATIONS:  You are well enough to be discharged from the hospital. However, because you were staying in a hospital, you are at greater risk of having been exposed to the coronavirus. PLEASE stay inside and quarantine for 14 days to prevent further spread of the coronavirus. MEDICATIONS:  START:   - Decadron 6 mg tablet. Tale one tablet daily. Start taking it tomorrow morning (10/12/21)  - Pepcid 20 mg tablet. Take 1 tablet twice a day. Start taking today afternoon. - Vitamin C 500 mg tablet. Take one tablet twice daily. Start taking it today afternoon.   - Zinc 50 mg tablet. Take one tablet daily. Start taking tomorrow morning (10/12/21)    STOP: None     CHANGES: None    Appointments  Follow-up Information     Follow up With Specialties Details Why Contact Info    Edward Orozco MD Pediatric Medicine, Family Medicine   [facility] [address] [facility]  [phone]             Please follow up with your PCP regarding:  - Covid infection   - Discussed your findings on CT Chest imaging with your PCP to discuss further management. - Diabetes Mellitus. Follow-up tests needed:   Chest X Ray within 6 weeks. Pending test results: Hemoglobin A1c and Vitamin D level.    At the time of your discharge the following test results are still pending: None . Please make sure you review these results with your outpatient follow-up provider(s). Specific symptoms to watch for: chest pain, shortness of breath, fever, chills, nausea, vomiting, diarrhea, change in mentation, falling, weakness, bleeding. DIET/what to eat:  Diabetic Diet    ACTIVITY:  Activity as tolerated    Wound care: None     Equipment needed:  None     What to do if new or unexpected symptoms occur? If you experience any of the above symptoms (or should other concerns or questions arise after discharge) please call your primary care physician. Return to the emergency room if you cannot get hold of your doctor. · It is very important that you keep your follow-up appointment(s). · Please bring discharge papers, medication list (and/or medication bottles) to your follow-up appointments for review by your outpatient provider(s). · Please check the list of medications and be sure it includes every medication (even non-prescription medications) that your provider wants you to take. · It is important that you take the medication exactly as they are prescribed. · Keep your medication in the bottles provided by the pharmacist and keep a list of the medication names, dosages, and times to be taken in your wallet. · Do not take other medications without consulting your doctor. · If you have any questions about your medications or other instructions, please talk to your nurse or care provider before you leave the hospital.     Information obtained by:     I understand that if any problems occur once I am at home I am to contact my physician. These instructions were explained to me and I had the opportunity to ask questions. I understand and acknowledge receipt of the instructions indicated above. [de-identified] or R.N.'s Signature                                                                  Date/Time                                                                                                                                              Patient or Representative Signature                                                          Date/Time    Patient Education        Learning How To Care for Someone Who Has COVID-19  Things to know  Most people who get COVID-19 will recover with time and home care. Here are some things to know if you're caring for someone who's sick. · Treat the symptoms. Common symptoms include a fever, coughing, and feeling short of breath. Urge the person to get extra rest and drink plenty of fluids to replace fluids lost from fever. To reduce a fever, offer acetaminophen (Tylenol) or ibuprofen (Advil, Motrin). It may also help with muscle aches. Read and follow all instructions on the label. · Watch for signs that the illness is getting worse. The person may need medical care if they're getting sicker (for example, if it's hard to breathe). But call the doctor's office before you go. They can tell you what to do. Call 911  or emergency services if the person has any of these symptoms:  ? Severe trouble breathing or shortness of breath. ? Constant pain or pressure in their chest.  ? Confusion, or trouble thinking clearly. ? Pale, gray, or blue-colored skin or lips. Some people are more likely to get very sick and need medical care. Call the doctor as soon as symptoms start if the person you're caring for is over 72, smokes, or has a serious health problem, like asthma, heart disease, diabetes, or an immune system problem. · Protect yourself and others. The virus spreads easily from person to person, so take extra care to avoid catching or spreading the infection. ? Keep the sick person away from others as much as you can.   § Have the person stay in one room. If you can, give them their own bathroom to use. § Have only one person take care of them. Keep other people--and pets--out of the sickroom. § Have the person wear a mask around other people. This includes when anyone is in the room with them or if they leave their room (for example, to go to the bathroom). § Don't share personal items. These include dishes, cups, towels, and bedding. ? Wash your hands often and well. Use soap and water, and scrub for at least 20 seconds. This is especially important after you've been around the sick person or touched things they've touched. If soap and water aren't handy, use an alcohol-based hand . ? Wear a mask when you're around other people after you've cared for someone who's sick. ? Avoid touching your mouth, nose, and eyes. ? Take care with the person's laundry. It's okay to wash the sick person's laundry with yours. If you have them, wear disposable gloves when handling their dirty laundry, and wash your hands well after you touch it. Wash items in the warmest water allowed for the fabric type, and dry them completely. ? Clean high-touch items every day and anytime the sick person touches them. These include doorknobs, light switches, toilets, counters, and remote controls. Use a household disinfectant or a homemade bleach solution. (Follow the directions on the label.) If the sick person has their own room, have them disinfect it every day. ? Avoid having visitors. If you have to have visitors, they need to wear a mask and stay at least 6 feet (2 meters) away from you. And keep the visit as short as possible. To help protect family and friends, stay in touch with them only by phone or computer. ? If you haven't had COVID-19 in the past 3 months or aren't fully vaccinated, you may need to quarantine. Where can you learn more?   Go to http://www.gray.com/  Enter A137 in the search box to learn more about \"Learning How To Care for Someone Who Has COVID-19. \"  Current as of: March 26, 2021               Content Version: 13.0  © 7727-9313 [employer], [employer]. Care instructions adapted under license by [employer] (which disclaims liability or warranty for this information). If you have questions about a medical condition or this instruction, always ask your healthcare professional. [place] [address] any warranty or liability for your use of this information.

## 2021-10-12 NOTE — PROGRESS NOTES
[facility]: MED RECONCILIATION    Comments/Recommendations:   Reviewed PTA medications with patient over the phone- patient is a good historian. Reported history is consistent with list in chart. Medications added:     Atorvastatin 40 mg nightly    Information obtained from: Patient, RxQuery    Allergies: Patient has no known allergies. Prior to Admission Medications:     Medication Documentation Review Audit       Reviewed by YEVGENIY GalvezD (Pharmacist) on 10/12/21 at 1041      Medication Sig Documenting Provider Last Dose Status Taking?   aspirin delayed-release 81 mg tablet Take 81 mg by mouth daily. Provider, Historical 10/11/2021 Active Yes   atorvastatin (LIPITOR) 40 mg tablet Take 40 mg by mouth nightly. Provider, Historical 10/11/2021 Active Yes   CIALIS 5 mg tablet Take 1 Tab by mouth daily as needed. Amirah Sheffield MD  Active Yes           Med Note (Mathew Marshall Oct 12, 2021 10:37 AM)     glimepiride (AMARYL) 4 mg tablet TAKE 1 TABLET BY MOUTH EVERY DAY IN THE MORNING Amirah Sheffield MD 10/11/2021 Active Yes   hydrocortisone (HYTONE) 2.5 % topical cream Apply  to affected area two (2) times daily as needed (Itching). use thin layer Provider, Historical 10/5/2021 Active Yes   Janumet  mg per tablet TAKE 1 TABLET BY MOUTH TWICE A DAY WITH MEALS Amirah Sheffield MD 10/11/2021 Active Yes   lisinopriL (PRINIVIL, ZESTRIL) 20 mg tablet TAKE 1 TABLET BY MOUTH EVERY DAY Amirah Sheffield MD 10/11/2021 Active Yes   omega-3 fatty acids cap Take 1,000 mg by mouth daily.  Provider, Historical 10/11/2021 Active Yes                  Hilaria Storm, PHARMD   Contact: [phone]

## 2021-10-12 NOTE — ED TRIAGE NOTES
Pt. States tested positive for covid last Saturday, states just feels worse, states fever headache SOB, 92% on RA.

## 2021-10-12 NOTE — ED PROVIDER NOTES
Chief Complaint   Patient presents with    Positive For Covid-19    Fever     This is a 82-year-old male with a history of non-insulin-dependent diabetes, hypertension, hyperlipidemia, remote history of prostate cancer, presenting with just over 2 weeks of intermittent fevers, cough, headache and myalgias, as well as generalized weakness and decreased appetite. Tested positive for COVID-19 at urgent care this past weekend (several days ago). He denies any chest pain or shortness of breath but was hypoxic to 91% on room air at the time of my exam, he denies any history of asthma or COPD or any structural heart disease to his knowledge. Does not smoke cigarettes. While ambulating the department his heart rate came up to the 130's. He has not been vaccinated against COVID-19. Works in [employer] by occupation. Denies any lower extremity pain or edema. No other systemic complaints. Symptoms are moderate in nature without alleviating factors.       Past Medical History:   Diagnosis Date    Colon polyps     Contact dermatitis and other eczema, due to unspecified cause     DM [facility]di[facility] 6/10/2009    Essential hypertension, benign     Hypertension     Malignant neoplasm of prostate ([facility] 9/25/2012    Mixed hyperlipidemia 6/10/2009    Mixed hyperlipidemia 6/10/2009    Prostate cancer ([facility] 11/2009    resection prostate       Past Surgical History:   Procedure Laterality Date    HX PROSTATECTOMY           Family History:   Problem Relation Age of Onset    Diabetes Mother     Hypertension Mother     Diabetes Father        Social History     Socioeconomic History    Marital status:      Spouse name: Not on file    Number of children: Not on file    Years of education: Not on file    Highest education level: Not on file   Occupational History    Not on file   Tobacco Use    Smoking status: Never Smoker    Smokeless tobacco: Never Used   Substance and Sexual Activity    Alcohol use: No    Drug use: No    Sexual activity: Yes     Partners: Female     Birth control/protection: None   Other Topics Concern    Not on file   Social History Narrative    Not on file     Social Determinants of Health     Financial Resource Strain:     Difficulty of Paying Living Expenses:    Food Insecurity:     Worried About Running Out of Food in the Last Year:     [address] in the Last Year:    Transportation Needs:     Lack of Transportation (Medical):  Lack of Transportation (Non-Medical):    Physical Activity:     Days of Exercise per Week:     Minutes of Exercise per Session:    Stress:     Feeling of Stress :    Social Connections:     Frequency of Communication with Friends and Family:     Frequency of Social Gatherings with Friends and Family:     Attends [employer] Services:     Active Member of Clubs or Organizations:     Attends Club or Organization Meetings:     Marital Status:    Intimate Partner Violence:     Fear of Current or Ex-Partner:     Emotionally Abused:     Physically Abused:     Sexually Abused: ALLERGIES: Patient has no known allergies. Review of Systems   Constitutional: Positive for fever. HENT: Negative for facial swelling. Eyes: Negative for redness. Respiratory: Positive for cough. Negative for shortness of breath. Cardiovascular: Negative for chest pain. Gastrointestinal: Negative for abdominal pain, diarrhea and vomiting. Genitourinary: Negative for difficulty urinating. Musculoskeletal: Positive for myalgias. Neurological: Positive for headaches. Psychiatric/Behavioral: Negative for confusion.        Vitals:    10/12/21 0900 10/12/21 1002 10/12/21 1040 10/12/21 1135   BP: (!) 163/86  (!) 144/89 (!) 148/94   Pulse: 89  99 99   Resp: 29  24 26   Temp:   (!) 102.4 °F (39.1 °C) (!) 101.1 °F (38.4 °C)   SpO2: 96% 94% 96% 95%   Weight:                Physical Exam  General:  Awake and alert, NAD  HEENT: NC/AT, equal pupils  Neck:   Normal inspection, full range of motion  Cardiac:  +Tachycardic, regular rhythm, no murmurs  Respiratory:  +Mildly diminished bilaterally, no wheezes, rales, rhonchi, normal effort  Abdomen:  Soft and nontender, nondistended  Extremities: Warm and well perfused, no peripheral edema  Neuro:  Moving all extremities symmetrically without gross motor deficit  Skin:   No rashes or pallor    RESULTS  Recent Results (from the past 12 hour(s))   CBC WITH AUTOMATED DIFF    Collection Time: 10/12/21  7:50 AM   Result Value Ref Range    WBC 10.2 4.1 - 11.1 K/uL    RBC 4.85 4.10 - 5.70 M/uL    HGB 15.7 12.1 - 17.0 g/dL    HCT 46.3 36.6 - 50.3 %    MCV 95.5 80.0 - 99.0 FL    MCH 32.4 26.0 - 34.0 PG    MCHC 33.9 30.0 - 36.5 g/dL    RDW 12.8 11.5 - 14.5 %    PLATELET 820 611 - 997 K/uL    MPV 9.6 8.9 - 12.9 FL    NRBC 0.0 0  WBC    ABSOLUTE NRBC 0.00 0.00 - 0.01 K/uL    NEUTROPHILS 74 32 - 75 %    BAND NEUTROPHILS 13 (H) 0 - 6 %    LYMPHOCYTES 6 (L) 12 - 49 %    MONOCYTES 7 5 - 13 %    EOSINOPHILS 0 0 - 7 %    BASOPHILS 0 0 - 1 %    IMMATURE GRANULOCYTES 0 %    ABS. NEUTROPHILS 8.9 (H) 1.8 - 8.0 K/UL    ABS. LYMPHOCYTES 0.6 (L) 0.8 - 3.5 K/UL    ABS. MONOCYTES 0.7 0.0 - 1.0 K/UL    ABS. EOSINOPHILS 0.0 0.0 - 0.4 K/UL    ABS. BASOPHILS 0.0 0.0 - 0.1 K/UL    ABS. IMM.  GRANS. 0.0 K/UL    DF MANUAL      PLATELET COMMENTS Large Platelets      RBC COMMENTS NORMOCYTIC, NORMOCHROMIC     METABOLIC PANEL, COMPREHENSIVE    Collection Time: 10/12/21  7:50 AM   Result Value Ref Range    Sodium 133 (L) 136 - 145 mmol/L    Potassium 4.8 3.5 - 5.1 mmol/L    Chloride 101 97 - 108 mmol/L    CO2 25 21 - 32 mmol/L    Anion gap 7 5 - 15 mmol/L    Glucose 186 (H) 65 - 100 mg/dL    BUN 22 (H) 6 - 20 MG/DL    Creatinine 1.23 0.70 - 1.30 MG/DL    BUN/Creatinine ratio 18 12 - 20      GFR est AA >60 >60 ml/min/1.73m2    GFR est non-AA 58 (L) >60 ml/min/1.73m2    Calcium 8.7 8.5 - 10.1 MG/DL    Bilirubin, total 0.6 0.2 - 1.0 MG/DL    ALT (SGPT) 39 12 - 78 U/L    AST (SGOT) 43 (H) 15 - 37 U/L    Alk. phosphatase 52 45 - 117 U/L    Protein, total 7.9 6.4 - 8.2 g/dL    Albumin 3.4 (L) 3.5 - 5.0 g/dL    Globulin 4.5 (H) 2.0 - 4.0 g/dL    A-G Ratio 0.8 (L) 1.1 - 2.2     SAMPLES BEING HELD    Collection Time: 10/12/21  7:50 AM   Result Value Ref Range    SAMPLES BEING HELD 1BL,1 PURPLE AND BLUE BLOOD CULTURE BOTTLE     COMMENT        Add-on orders for these samples will be processed based on acceptable specimen integrity and analyte stability, which may vary by analyte.    MAGNESIUM    Collection Time: 10/12/21  7:50 AM   Result Value Ref Range    Magnesium 2.5 (H) 1.6 - 2.4 mg/dL   PROCALCITONIN    Collection Time: 10/12/21  7:50 AM   Result Value Ref Range    Procalcitonin 0.14 ng/mL   TROPONIN-HIGH SENSITIVITY    Collection Time: 10/12/21  7:50 AM   Result Value Ref Range    Troponin-High Sensitivity 9 0 - 76 ng/L   LACTIC ACID    Collection Time: 10/12/21  7:50 AM   Result Value Ref Range    Lactic acid 2.4 (HH) 0.4 - 2.0 MMOL/L   C REACTIVE PROTEIN, QT    Collection Time: 10/12/21  7:50 AM   Result Value Ref Range    C-Reactive protein 7.09 (H) 0.00 - 0.60 mg/dL   D DIMER    Collection Time: 10/12/21  7:50 AM   Result Value Ref Range    D-dimer 1.16 (H) 0.00 - 0.65 mg/L FEU   LD    Collection Time: 10/12/21  7:50 AM   Result Value Ref Range     (H) 85 - 241 U/L   URINALYSIS W/ REFLEX CULTURE    Collection Time: 10/12/21 10:01 AM    Specimen: Urine    Urine specimen   Result Value Ref Range    Color YELLOW/STRAW      Appearance CLOUDY (A) CLEAR      Specific gravity >1.030 (H) 1.003 - 1.030    pH (UA) 5.5 5.0 - 8.0      Protein 300 (A) NEG mg/dL    Glucose Negative NEG mg/dL    Ketone 15 (A) NEG mg/dL    Blood TRACE (A) NEG      Urobilinogen 0.2 0.2 - 1.0 EU/dL    Nitrites Negative NEG      Leukocyte Esterase Negative NEG      WBC 0-4 0 - 4 /hpf    RBC 5-10 0 - 5 /hpf    Epithelial cells FEW FEW /lpf    Bacteria Negative NEG /hpf UA: UC IF INDICATED CULTURE NOT INDICATED BY UA RESULT CNI      Amorphous Crystals 1+ (A) NEG    Hyaline cast 10-20 0 - 5 /lpf    Granular cast 10-20 (A) NEG /lpf   BILIRUBIN, CONFIRM    Collection Time: 10/12/21 10:01 AM   Result Value Ref Range    Bilirubin UA, confirm Negative NEG     LACTIC ACID    Collection Time: 10/12/21 10:39 AM   Result Value Ref Range    Lactic acid 1.9 0.4 - 2.0 MMOL/L        IMAGING  CTA CHEST W OR W WO CONT    Result Date: 10/12/2021  1. No evidence of pulmonary embolus right heart strain. 2.  Diffuse bilateral airspace disease consistent with COVID-19 pneumonia. 3.  Severe three-vessel coronary calcifications. 4.  Indeterminate bilateral adrenal nodules. Recommend further evaluation with adrenal mass protocol CT. XR CHEST PORT    Result Date: 10/12/2021  No acute process identified. Procedures - none unless documented below  EKG as interpreted by me:  Sinus tachycardia at a rate of 108, left axis deviation, normal intervals, grossly no ST or T wave changes suggesting acute ischemia. ED course: Labs, EKG and imaging reviewed. Unvaccinated patient with COVID-19 presenting with resting pulse oximetry of 91% requiring 2 liters nasal cannula, no respiratory distress. Chest film unremarkable, will proceed with CTA chest to evaluate further for signs of pneumonia and also to exclude pulmonary embolism. IV fluids infusing as he has not been eating or drinking well, urine appears concentrated, he's tachycardic here and heart rate came up to the 130's while ambulating. Lactic acid 2.4, likely due to volume depletion rather than sepsis, urine appears very concentrated. Procalcitonin within normal limits so less likely due to bacterial process. Will admit for continued management, discussed with the family medicine practice. Addendum 1140:  CTA chest negative for pulmonary embolism, there is bilateral airspace disease consistent with COVID pneumonia.     CODE SEPSIS CALLED AT 8:58 AM  - Sepsis order set entered:   YES  - Broad spectrum antibiotics ordered: No bacterial infection suspected, procalcitonin normal so doubt bacterial process, suspect viral etiology (COVID-19), lactic acidosis and tachycardia likely due to volume depletion and viral illness  - Repeat lactic acid ordered for time:  1030  - Sepsis reassessment:   Clinical improvement with IV fluids, tachycardia resolved, nasal oxygen started with improvement in oxygen saturations  - Actions taken:    See above    IF SEPTIC SHOCK (SBP<90, MAP<65, Lactate >4):   - Hypotension or lactic acidosis present: NO   - IVF:      Not indicated due to septic shock not present  - Persistent hypotension despite IVF:  NO  - Vasopressors:    Not indicated due to septic shock not present  - Disposition:     Admit to Telemetry    [facility] Perfect Serve for Admission  8:43 AM    ED Room Number:   ER10/10  Patient Name and age:  Pearson Peabody 73 y.o.  male  Working Diagnosis:     1. COVID-19 virus infection    2. Hypoxia    3. Dehydration    4. Tachycardia    5. Lactic acidosis      COVID suspicion:   yes  Code Status:    Full Code  Readmission:    no  Isolation Requirements:  yes  Recommended Level of Care: telemetry  Department:    [facility] ED - [phone]  Other:     Unvaccinated patient with COVID-19 presenting with resting pulse oximetry of 91% requiring 2 liters nasal cannula. Chest film unremarkable, will proceed with CTA chest to evaluate further and exclude pulmonary embolism. IV fluids infusing as he has not been eating or drinking well, urine appears concentrated, he's tachycardic here and heart rate came up to the 130's while ambulating.

## 2021-10-12 NOTE — ED NOTES
Patient's temp: 102.4 F. Given 650 mg of tylenol. Patient resting comfortably. No complaints at this time.

## 2021-10-12 NOTE — DISCHARGE SUMMARY
[facility] [address] [place] [address]   Office [phone]  Fax [phone]       Discharge / Transfer / Off-Service Note     Name: Rashid Lamb MRN: [id number]  Sex: Male   YOB: 1950  Age: 73 y.o. PCP: Naomie Mtz MD     Date of admission: 10/12/2021  Date of discharge/transfer: 10/12/2021    Attending physician at admission: Florina Lynne. Attending physician at discharge/transfer: Florina Lynne MD  Resident physician at discharge/transfer: Adrian Wynn MD     Consultants during hospitalization  IP CONSULT TO [facility]     Admission diagnoses   COVID-19 virus infection [U07.1]    Recommended follow-up after discharge    1. PCP-Meño Sahu MD     Things to follow up on with PCP:   - Covid infection   - Discussed your findings on CT Chest imaging with your PCP to decide/determine further management. - Diabetes Mellitus   ----------------------------------------------------------------------------------------------------------------------------  The patient was well enough to be discharged from the hospital. However, because they were inpatient in a hospital, they are at greater risk of having been exposed to the coronavirus. PLEASE stay inside and self-quarantine for 14 days to prevent further spread of the coronavirus.  ------------------------------------------------------------------------------------------------------------------    Medication Changes:    START:   - Decadron 6 mg tablet. Tale one tablet daily. Start taking it tomorrow morning.  - Pepcid 20 mg PO BID daily while on steroids. - Vitamin C 500 mg tablet. Take one tablet twice daily. - Zinc 50 mg tablet. Take one tablet daily. STOP: None     CHANGES: None     No other changes were made to your medications, please take all your other home medicines as previously prescribed.      History of Present Illness  As per admitting provider:     Rashid Lamb is a 73 y.o. male with a PMH of HTN, T2DM, HLD, VIT D deficiency, H/o Prostate cancer who presents to the ED complaining of Poor appetite and loss of taste and smell. Pt covid unvaccinated. Tested positive for covid on 10/9. Pt reports that symptoms started about 10 days ago, reporst poor apetite and loss of smell and taste that has been worsening, he also reports mild fever (not measure) and headaches. Patient denies SOB, CP, nausea, vomiting, diarrhea. He was exposed to one of his co-workers who tested positive prior the development of his symptoms.      He was seen on ED on 10/9 for above symptoms and was discharge home with supportive care only. Hospital course    Covid PNA: Tested positive on 10/9/21. CXR: NAP. CTA Chest: No PE. Diffuse bilateral airspace disease consistent with COVID-19 pneumonia. Pt did not need home O2. O2 Saturations wnl on room air.  - Follow up as OP           - Decadron 6 mg tablet daily x 5 days.           - Pepcid 20 mg PO BID daily. - Vitamin C 500 mg tablet PO BID daily   - Zinc 50 mg tablet daily.   - Repeat CXR in 6-8 weeks     Elevated Lactid Acid: Resolved. POA LA: 2.4>>1.9.  Likely secondary IVVD.      Bilateral Adrenal Nodules: Seen on POS CTA Chest. CT abdomen from 2009 showed lobular enlargement of the adrenal glands with a 2 cm mass projecting off the lateral limb of the left adrenal gland.   - Pt to follow up as OP with PCP and decide/discuss further management      Hypertension: Stable  - Continue Home Lisinopril 20 mg PO daily.   - Follow up as OP     Hyperlipidemia:         Lab Results   Component Value Date/Time     Cholesterol, total 205 (H) 06/17/2020 12:13 PM     HDL Cholesterol 57 06/17/2020 12:13 PM     LDL, calculated 130.8 (H) 06/17/2020 12:13 PM     VLDL, calculated 17.2 06/17/2020 12:13 PM     Triglyceride 86 06/17/2020 12:13 PM     CHOL/HDL Ratio 3.6 06/17/2020 12:13 PM   anageme  - Continue Atorvastatin 40 mg PO daily   - Follow as OP   - Lipid panel in process.     DM2 : Last HgA1C: 6.4 on 6/17/2020   - Continue Janumet  mg PO BID daily   - Continue Amaryl 4 mg PO dialy  - Follow up as OP   - HgbA1C in process     Vit D deficiency: Vit D: 20.1 on 6/17/20  - Follow up as OP.   - Vit D level in process        Physical exam at discharge:       Visit Vitals  /76 (BP 1 Location: Left upper arm)   Pulse 95   Temp 99.9 °F (37.7 °C)   Resp 25   Wt 224 lb (101.6 kg)   SpO2 94%   BMI 29.55 kg/m²      General: No acute distress. Alert. Cooperative. Throat: Moist mucous membranes. Respiratory: CTAB. No w/r/r/c.  Cardiovascular: RRR. Normal S1,S2. No m/r/g.   GI: Nondistended.+ bowel sounds. Nontender. No rebound tenderness or guarding. Extremities: No LE edema. Distal pulses present. Musculoskeletal: Full ROM in all extremities. Skin: Warm, dry. No rashes. Neuro: Alert and oriented. Condition at discharge: Stable. Labs  Recent Labs     10/12/21  0750   WBC 10.2   HGB 15.7   HCT 46.3        Recent Labs     10/12/21  0750   *   K 4.8      CO2 25   BUN 22*   CREA 1.23   *   CA 8.7   MG 2.5*     Recent Labs     10/12/21  0750   ALT 39   AP 52   TBILI 0.6   TP 7.9   ALB 3.4*   GLOB 4.5*     Recent Labs     10/12/21  0750   FERR 652*       Micro:  No results found for: CULT    Imaging:  CTA CHEST W OR W WO CONT    Result Date: 10/12/2021  INDICATION: COVID+, negative CXR, rule out PE Needs IV contrast EXAMINATION:  CT ANGIOGRAPHY CHEST COMPARISON: None  TECHNIQUE:  CT Angiography of the chest was performed following the uneventful administration of IV contrast.  3D MIP image postprocessing was performed. CT dose reduction was achieved through the use of a standardized protocol tailored for this examination and automatic exposure control for dose modulation. FINDINGS: THYROID: Unremarkable. VASCULAR STRUCTURES: No thoracic aortic aneurysm or dissection.  No evidence of pulmonary embolus to the level of the segmental pulmonary arteries. The artery diameter is within normal limits. MEDIASTINUM: A few reactive mediastinal lymph nodes are seen. PHIL: Bilateral prominent hilar lymph nodes are likely reactive. HEART/PERICARDIUM: Within normal limits of size. Severe three-vessel coronary artery calcification seen. [place] LUNGS: The trachea and mainstem bronchi are patent and clear. A few scattered areas of bronchiectasis in the lower lobes and lingula are noted. Diffuse patchy foci are seen in a dependent and peripheral distribution predominantly, though a few perihilar ground glass foci are noted. No pleural effusion or pneumothorax. CHEST WALL: No destructive bone lesion. INCIDENTALLY IMAGED UPPER ABDOMEN: Indeterminate bilateral adrenal nodules measuring 1.1 x 1.5 cm on the right and 2.1 x 2.3 cm on the left. The left adrenal nodule measured 1.5 x 1.5 cm in 2009. Simple left renal cyst measuring 5.5 x 4.8 cm does not require follow-up. ADDITIONAL COMMENTS: N/A.     1.  No evidence of pulmonary embolus right heart strain. 2.  Diffuse bilateral airspace disease consistent with COVID-19 pneumonia. 3.  Severe three-vessel coronary calcifications. 4.  Indeterminate bilateral adrenal nodules. Recommend further evaluation with adrenal mass protocol CT. XR CHEST PORT    Result Date: 10/12/2021  Clinical history: covid+ INDICATION:   covid+ COMPARISON: 10/30/2009 FINDINGS: AP portable upright view of the chest demonstrates a stable  cardiopericardial silhouette. There is no pleural effusion. .There is no focal consolidation. .There is no pneumothorax. .     No acute process identified. CT Results  (Last 48 hours)               10/12/21 1109  CTA CHEST W OR W WO CONT Final result    Impression:  1. No evidence of pulmonary embolus right heart strain. 2.  Diffuse bilateral airspace disease consistent with COVID-19 pneumonia. 3.  Severe three-vessel coronary calcifications. 4.  Indeterminate bilateral adrenal nodules.  Recommend further evaluation with   adrenal mass protocol CT. Narrative:  INDICATION: COVID+, negative CXR, rule out PE Needs IV contrast       EXAMINATION:  CT ANGIOGRAPHY CHEST       COMPARISON: None       TECHNIQUE:  CT Angiography of the chest was performed following the uneventful   administration of IV contrast.  3D MIP image postprocessing was performed. CT   dose reduction was achieved through the use of a standardized protocol tailored   for this examination and automatic exposure control for dose modulation. FINDINGS:       THYROID: Unremarkable. VASCULAR STRUCTURES: No thoracic aortic aneurysm or dissection. No evidence of   pulmonary embolus to the level of the segmental pulmonary arteries. The artery   diameter is within normal limits. MEDIASTINUM: A few reactive mediastinal lymph nodes are seen. PHIL: Bilateral prominent hilar lymph nodes are likely reactive. HEART/PERICARDIUM: Within normal limits of size. Severe three-vessel coronary   artery calcification seen. [place] LUNGS: The trachea and mainstem bronchi are patent and clear. A few scattered   areas of bronchiectasis in the lower lobes and lingula are noted. Diffuse patchy   foci are seen in a dependent and peripheral distribution predominantly, though a   few perihilar ground glass foci are noted. No pleural effusion or pneumothorax. CHEST WALL: No destructive bone lesion. INCIDENTALLY IMAGED UPPER ABDOMEN: Indeterminate bilateral adrenal nodules   measuring 1.1 x 1.5 cm on the right and 2.1 x 2.3 cm on the left. The left   adrenal nodule measured 1.5 x 1.5 cm in 2009. Simple left renal cyst measuring   5.5 x 4.8 cm does not require follow-up. ADDITIONAL COMMENTS: N/A.                  Chronic diagnoses   Problem List as of 10/12/2021 Date Reviewed: 6/17/2020        Codes Class Noted - Resolved    * (Principal) COVID-19 virus infection ICD-10-CM: U07.1  ICD-9-CM: 079.89  10/12/2021 - Present        Lactic acid blood increased ICD-10-CM: R79.89  ICD-9-CM: 790.6  10/12/2021 - Present        Adrenal nodule ([facility] ICD-10-CM: E27.8  ICD-9-CM: 255.8  10/12/2021 - Present        Aortic stenosis ICD-10-CM: I35.0  ICD-9-CM: 424.1  5/4/2015 - Present    Overview Signed 5/4/2015  7:24 AM by Eli Olivas MD     5/14 echo normal lvef, mild as/ai mean gradient 10mm, mild tr pa pressure 47mm             Bradycardia ICD-10-CM: R00.1  ICD-9-CM: 427.89  4/23/2014 - Present        Malignant neoplasm of prostate ([facility] ICD-10-CM: C61  ICD-9-CM: 589  9/25/2012 - Present        Other atopic dermatitis and related conditions ICD-10-CM: L20.89  ICD-9-CM: 691.8  1/26/2011 - Present        Hypogonadism male ICD-10-CM: E29.1  ICD-9-CM: 257.2  9/13/2010 - Present        DM type 2 (diabetes mellitus, type 2) ([facility] ICD-10-CM: E11.9  ICD-9-CM: 250.00  6/10/2009 - Present        Mixed hyperlipidemia ICD-10-CM: E78.2  ICD-9-CM: 272.2  6/10/2009 - Present        HTN (hypertension) ICD-10-CM: I10  ICD-9-CM: 401.9  6/10/2009 - Present        Vitamin D deficiency ICD-10-CM: E55.9  ICD-9-CM: 268.9  6/10/2009 - Present        Cardiac murmur ICD-10-CM: R01.1  ICD-9-CM: 785.2  6/10/2009 - Present              Discharge Medication List as of 10/12/2021  3:14 PM      START taking these medications    Details   ascorbic acid, vitamin C, (VITAMIN C) 500 mg tablet Take 1 Tablet by mouth two (2) times a day., Normal, Disp-20 Tablet, R-0      zinc gluconate 50 mg tablet Take 1 Tablet by mouth daily. , Normal, Disp-10 Tablet, R-0      dexAMETHasone (DECADRON) 6 mg tablet Take 1 Tablet by mouth once over twenty-four (24) hours. , Normal, Disp-9 Tablet, R-0      famotidine (PEPCID) 20 mg tablet Take 1 Tablet by mouth two (2) times a day., Normal, Disp-20 Tablet, R-0         CONTINUE these medications which have NOT CHANGED    Details   atorvastatin (LIPITOR) 40 mg tablet Take 40 mg by mouth nightly., Historical Med      hydrocortisone (HYTONE) 2.5 % topical cream Apply  to affected area two (2) times daily as needed (Itching). use thin layer, Historical Med      Janumet  mg per tablet TAKE 1 TABLET BY MOUTH TWICE A DAY WITH MEALS, Normal, Disp-180 Tablet, R-0      lisinopriL (PRINIVIL, ZESTRIL) 20 mg tablet TAKE 1 TABLET BY MOUTH EVERY DAY, Normal, Disp-90 Tablet, R-1      glimepiride (AMARYL) 4 mg tablet TAKE 1 TABLET BY MOUTH EVERY DAY IN THE MORNING, Normal, Disp-90 Tab, R-3DX Code Needed  . CIALIS 5 mg tablet Take 1 Tab by mouth daily as needed., Normal, Disp-24 Tab, R-1, DARYL      aspirin delayed-release 81 mg tablet Take 81 mg by mouth daily. , Historical Med      omega-3 fatty acids cap Take 1,000 mg by mouth daily. , Historical Med              Diet:  Diabetic diet.     Activity:  As tolerated     Disposition: Home or Self Care    Discharge instructions to patient/family  Please seek medical attention for any new or worsening symptoms particularly fever, chest pain, shortness of breath, abdominal pain, nausea, vomiting    Follow up plans/appointments  Follow-up Information     Follow up With Specialties Details Why Contact Juan Britton MD Pediatric Medicine, Family Medicine On 10/14/2021 at 3.15 you will have virtual follow up with your PCP [address] [place], [place] [address]graeme Pérez MD  Family Medicine Resident       For Billing    Chief Complaint   Patient presents with    Positive For Covid-19   [facility] Problems  Date Reviewed: 6/17/2020        Codes Class Noted POA    * (Principal) COVID-19 virus infection ICD-10-CM: U07.1  ICD-9-CM: 079.89  10/12/2021 Unknown        Lactic acid blood increased ICD-10-CM: R79.89  ICD-9-CM: 790.6  10/12/2021 Unknown        Adrenal nodule ([facility] ICD-10-CM: E27.8  ICD-9-CM: 255.8  10/12/2021 Unknown        Malignant neoplasm of prostate ([facility] ICD-10-CM: C61  ICD-9-CM: 185  9/25/2012 Yes        DM type 2 (diabetes mellitus, type 2) ([facility] ICD-10-CM: E11.9  ICD-9-CM: 250.00  6/10/2009 Yes        Mixed hyperlipidemia ICD-10-CM: E78.2  ICD-9-CM: 272.2  6/10/2009 Yes        HTN (hypertension) ICD-10-CM: I10  ICD-9-CM: 401.9  6/10/2009 Yes        Vitamin D deficiency ICD-10-CM: E55.9  ICD-9-CM: 268.9  6/10/2009 Yes

## 2021-10-12 NOTE — H&P
[address] [facility]   Admission H&P    Date of admission: 10/12/2021    Patient name: Garett Belcher  MRN: [id number]  YOB: 1950  Age: 73 y.o. Primary care provider:  Merle Augustin MD     Source of Information: patient, medical records    Chief complaint:  Poor appetite and loss of taste and smell. History of Present Illness  Garett Belcher is a 73 y.o. male with a PMH of HTN, T2DM, HLD, VIT D deficiency, H/o Prostate cancer who presents to the ED complaining of Poor appetite and loss of taste and smell. Pt covid unvaccinated. Tested positive for covid on 10/9. Pt reports that symptoms started about 10 days ago, reporst poor apetite and loss of smell and taste that has been worsening, he also reports mild fever (not measure) and headaches. Patient denies SOB, CP, nausea, vomiting, diarrhea. He was exposed to one of his co-workers who tested positive prior the development of his symptoms. He was seen on ED on 10/9 for above symptoms and was discharge home with supportive care only. COVID Questions:   Experiencing any of the following symptoms: fever, chills, cough, SOB, diarrhea, URI symptoms. Yes. Any Sick contacts fever, cough, diarrhea, SOB, URI symptoms. Yes. Traveled out of state or out of country. No     In the ED:   Vitals: T:97.3 BP:130/84 HR:109 RR:18 O2Sat: 92% on RA. Labs: WBC: 10.2 Hg:15.7 Na:133 Cr:1.23 Trop: 9 LA: 2.4>>1.9  Imaging: CXR: NAP. CTA: No PE. Diffuse bilateral airspace disease consistent with COVID-19 pneumonia. Severe three-vessel coronary calcifications. Indeterminate bilateral adrenal nodules. Recommend further evaluation with  adrenal mass protocol CT. EKG: Sinus Tachycardia, no ST abnormalities, normal QTc. Treatment: 1 L NS as bolus. Review of Systems  Review of Systems   Constitutional: Positive for fever. Respiratory: Negative for cough and shortness of breath.     Cardiovascular: Negative for chest pain, palpitations and leg swelling. Gastrointestinal: Negative for abdominal pain, diarrhea, nausea and vomiting. Genitourinary: Negative for dysuria, frequency and urgency. Musculoskeletal: Negative for myalgias. Neurological: Positive for headaches. Negative for dizziness and focal weakness. Endo/Heme/Allergies: Does not bruise/bleed easily. Psychiatric/Behavioral: Negative for depression. Home Medications   Prior to Admission medications    Medication Sig Start Date End Date Taking? Authorizing Provider   atorvastatin (LIPITOR) 40 mg tablet Take 40 mg by mouth nightly. Yes Provider, Historical   hydrocortisone (HYTONE) 2.5 % topical cream Apply  to affected area two (2) times daily as needed (Itching). use thin layer   Yes Provider, Historical   ascorbic acid, vitamin C, (VITAMIN C) 500 mg tablet Take 1 Tablet by mouth two (2) times a day. 10/12/21  Yes Willow Olmedo MD   zinc gluconate 50 mg tablet Take 1 Tablet by mouth daily. 10/13/21  Yes Willow Olmedo MD   dexAMETHasone (DECADRON) 6 mg tablet Take 1 Tablet by mouth once over twenty-four (24) hours. 10/13/21  Yes Willow Olmedo MD   famotidine (PEPCID) 20 mg tablet Take 1 Tablet by mouth two (2) times a day. 10/12/21  Yes Willow Olmedo MD   Janumet  mg per tablet TAKE 1 TABLET BY MOUTH TWICE A DAY WITH MEALS 8/26/21  Yes Sanchez Holm MD   lisinopriL (PRINIVIL, ZESTRIL) 20 mg tablet TAKE 1 TABLET BY MOUTH EVERY DAY 5/27/21  Yes Sanchez Holm MD   glimepiride (AMARYL) 4 mg tablet TAKE 1 TABLET BY MOUTH EVERY DAY IN THE MORNING 3/16/21  Yes Sanchez Holm MD   CIALIS 5 mg tablet Take 1 Tab by mouth daily as needed. 12/6/17  Yes Sanchez Holm MD   aspirin delayed-release 81 mg tablet Take 81 mg by mouth daily. Yes Provider, Historical   omega-3 fatty acids cap Take 1,000 mg by mouth daily.    Yes Provider, Historical   Janumet  mg per tablet TAKE 1 TABLET BY MOUTH TWICE A DAY WITH MEALS 5/27/21   Estephania Amin MD   hydrocortisone (HYTONE) 2.5 % topical cream APPLY TO AFFECTED AREA TWO (2) TIMES A DAY. USE THIN LAYER  Patient not taking: Reported on 10/9/2021 9/16/20 10/12/21  Estephania Amin MD   atorvastatin (LIPITOR) 40 mg tablet Take 1 Tab by mouth daily for 90 days. Indications: high cholesterol and high triglycerides 6/21/20   Estephania Amin MD   glucose blood VI test strips [facility] ULTRA TEST) strip Check BS daily  Patient not taking: Reported on 10/9/2021 4/20/17 10/12/21  Estephania Amin MD       Allergies   No Known Allergies    Past Medical History:   Diagnosis Date    Colon polyps     Contact dermatitis and other eczema, due to unspecified cause     DM type 2 (diabetes mellitus, type 2) ([facility] 6/10/2009    Essential hypertension, benign     Hypertension     Malignant neoplasm of prostate ([facility] 9/25/2012    Mixed hyperlipidemia 6/10/2009    Mixed hyperlipidemia 6/10/2009    Prostate cancer ([facility] 11/2009    resection prostate       Past Surgical History:   Procedure Laterality Date    HX PROSTATECTOMY         Family History   Problem Relation Age of Onset    Diabetes Mother     Hypertension Mother     Diabetes Father        Social History   Patient resides  x  Independently    x  With family care. Lives with Wife. Assisted living      SNF      Ambulates  x  Independently      With cane       Assisted walker      Alcohol history   x  None     Social     Chronic     Smoking history  x  None     Former smoker       Current smoker   Pt does smoke Marijuana on a daily basis for many years. Social History     Tobacco Use   Smoking Status Never Smoker   Smokeless Tobacco Never Used       Drug history  x  None     Former drug user     Current drug user     Code status  x  Full code     DNR/DNI     Partial    Code status discussed with the patient/caregivers.     Physical Exam  Visit Vitals  /76 (BP 1 Location: Left upper arm)   Pulse 95   Temp 99.9 °F (37.7 °C)   Resp 25   Wt 224 lb (101.6 kg)   SpO2 94%   BMI 29.55 kg/m²        Physical Exam  Constitutional:       Appearance: Normal appearance. HENT:      Head: Normocephalic and atraumatic. Right Ear: External ear normal.      Left Ear: External ear normal.      Nose: Nose normal.      Mouth/Throat:      Mouth: Mucous membranes are moist.      Pharynx: Oropharynx is clear. Eyes:      Pupils: Pupils are equal, round, and reactive to light. Cardiovascular:      Rate and Rhythm: Regular rhythm. Tachycardia present. Pulses: Normal pulses. Heart sounds: Normal heart sounds. Pulmonary:      Effort: Pulmonary effort is normal. No respiratory distress. Breath sounds: Normal breath sounds. No stridor. No wheezing. Abdominal:      General: Bowel sounds are normal. There is no distension. Palpations: Abdomen is soft. Tenderness: There is no abdominal tenderness. There is no guarding. Musculoskeletal:         General: Normal range of motion. Cervical back: Normal range of motion and neck supple. Skin:     General: Skin is warm and dry. Capillary Refill: Capillary refill takes less than 2 seconds. Neurological:      General: No focal deficit present. Mental Status: He is alert and oriented to person, place, and time.    Psychiatric:         Mood and Affect: Mood normal.          Laboratory Data  Recent Results (from the past 24 hour(s))   CBC WITH AUTOMATED DIFF    Collection Time: 10/12/21  7:50 AM   Result Value Ref Range    WBC 10.2 4.1 - 11.1 K/uL    RBC 4.85 4.10 - 5.70 M/uL    HGB 15.7 12.1 - 17.0 g/dL    HCT 46.3 36.6 - 50.3 %    MCV 95.5 80.0 - 99.0 FL    MCH 32.4 26.0 - 34.0 PG    MCHC 33.9 30.0 - 36.5 g/dL    RDW 12.8 11.5 - 14.5 %    PLATELET 710 007 - 683 K/uL    MPV 9.6 8.9 - 12.9 FL    NRBC 0.0 0  WBC    ABSOLUTE NRBC 0.00 0.00 - 0.01 K/uL    NEUTROPHILS 74 32 - 75 %    BAND NEUTROPHILS 13 (H) 0 - 6 %    LYMPHOCYTES 6 (L) 12 - 49 % MONOCYTES 7 5 - 13 %    EOSINOPHILS 0 0 - 7 %    BASOPHILS 0 0 - 1 %    IMMATURE GRANULOCYTES 0 %    ABS. NEUTROPHILS 8.9 (H) 1.8 - 8.0 K/UL    ABS. LYMPHOCYTES 0.6 (L) 0.8 - 3.5 K/UL    ABS. MONOCYTES 0.7 0.0 - 1.0 K/UL    ABS. EOSINOPHILS 0.0 0.0 - 0.4 K/UL    ABS. BASOPHILS 0.0 0.0 - 0.1 K/UL    ABS. IMM. GRANS. 0.0 K/UL    DF MANUAL      PLATELET COMMENTS Large Platelets      RBC COMMENTS NORMOCYTIC, NORMOCHROMIC     METABOLIC PANEL, COMPREHENSIVE    Collection Time: 10/12/21  7:50 AM   Result Value Ref Range    Sodium 133 (L) 136 - 145 mmol/L    Potassium 4.8 3.5 - 5.1 mmol/L    Chloride 101 97 - 108 mmol/L    CO2 25 21 - 32 mmol/L    Anion gap 7 5 - 15 mmol/L    Glucose 186 (H) 65 - 100 mg/dL    BUN 22 (H) 6 - 20 MG/DL    Creatinine 1.23 0.70 - 1.30 MG/DL    BUN/Creatinine ratio 18 12 - 20      GFR est AA >60 >60 ml/min/1.73m2    GFR est non-AA 58 (L) >60 ml/min/1.73m2    Calcium 8.7 8.5 - 10.1 MG/DL    Bilirubin, total 0.6 0.2 - 1.0 MG/DL    ALT (SGPT) 39 12 - 78 U/L    AST (SGOT) 43 (H) 15 - 37 U/L    Alk. phosphatase 52 45 - 117 U/L    Protein, total 7.9 6.4 - 8.2 g/dL    Albumin 3.4 (L) 3.5 - 5.0 g/dL    Globulin 4.5 (H) 2.0 - 4.0 g/dL    A-G Ratio 0.8 (L) 1.1 - 2.2     SAMPLES BEING HELD    Collection Time: 10/12/21  7:50 AM   Result Value Ref Range    SAMPLES BEING HELD 1BL,1 PURPLE AND BLUE BLOOD CULTURE BOTTLE     COMMENT        Add-on orders for these samples will be processed based on acceptable specimen integrity and analyte stability, which may vary by analyte.    MAGNESIUM    Collection Time: 10/12/21  7:50 AM   Result Value Ref Range    Magnesium 2.5 (H) 1.6 - 2.4 mg/dL   PROCALCITONIN    Collection Time: 10/12/21  7:50 AM   Result Value Ref Range    Procalcitonin 0.14 ng/mL   TROPONIN-HIGH SENSITIVITY    Collection Time: 10/12/21  7:50 AM   Result Value Ref Range    Troponin-High Sensitivity 9 0 - 76 ng/L   LACTIC ACID    Collection Time: 10/12/21  7:50 AM   Result Value Ref Range    Lactic acid 2.4 (HH) 0.4 - 2.0 MMOL/L   C REACTIVE PROTEIN, QT    Collection Time: 10/12/21  7:50 AM   Result Value Ref Range    C-Reactive protein 7.09 (H) 0.00 - 0.60 mg/dL   D DIMER    Collection Time: 10/12/21  7:50 AM   Result Value Ref Range    D-dimer 1.16 (H) 0.00 - 0.65 mg/L FEU   FERRITIN    Collection Time: 10/12/21  7:50 AM   Result Value Ref Range    Ferritin 652 (H) 26 - 388 NG/ML   LD    Collection Time: 10/12/21  7:50 AM   Result Value Ref Range     (H) 85 - 241 U/L   HEMOGLOBIN A1C WITH EAG    Collection Time: 10/12/21  7:50 AM   Result Value Ref Range    Hemoglobin A1c 6.1 (H) 4.0 - 5.6 %    Est. average glucose 128 mg/dL   URINALYSIS W/ REFLEX CULTURE    Collection Time: 10/12/21 10:01 AM    Specimen: Urine    Urine specimen   Result Value Ref Range    Color YELLOW/STRAW      Appearance CLOUDY (A) CLEAR      Specific gravity >1.030 (H) 1.003 - 1.030    pH (UA) 5.5 5.0 - 8.0      Protein 300 (A) NEG mg/dL    Glucose Negative NEG mg/dL    Ketone 15 (A) NEG mg/dL    Blood TRACE (A) NEG      Urobilinogen 0.2 0.2 - 1.0 EU/dL    Nitrites Negative NEG      Leukocyte Esterase Negative NEG      WBC 0-4 0 - 4 /hpf    RBC 5-10 0 - 5 /hpf    Epithelial cells FEW FEW /lpf    Bacteria Negative NEG /hpf    UA:UC IF INDICATED CULTURE NOT INDICATED BY UA RESULT CNI      Amorphous Crystals 1+ (A) NEG    Hyaline cast 10-20 0 - 5 /lpf    Granular cast 10-20 (A) NEG /lpf   BILIRUBIN, CONFIRM    Collection Time: 10/12/21 10:01 AM   Result Value Ref Range    Bilirubin UA, confirm Negative NEG     LACTIC ACID    Collection Time: 10/12/21 10:39 AM   Result Value Ref Range    Lactic acid 1.9 0.4 - 2.0 MMOL/L       Imaging  CTA CHEST W OR W WO CONT    Result Date: 10/12/2021  1. No evidence of pulmonary embolus right heart strain. 2.  Diffuse bilateral airspace disease consistent with COVID-19 pneumonia. 3.  Severe three-vessel coronary calcifications. 4.  Indeterminate bilateral adrenal nodules.  Recommend further evaluation with adrenal mass protocol CT. XR CHEST PORT    Result Date: 10/12/2021  No acute process identified. Assessment and Plan     Maximiliano Olmos is a 73 y.o. male with a PMH of f HTN, T2DM, HLD, VIT D deficiency, H/o Prostate cancer who is admitted for AHRF 2/2 Covid PNA. Pedro Ochoa AHRF 2/2 Covid PNA: O2Sat reported in ED as 91% while walking, improved after being placed in O2 2 L NC. On my examination pt resting with no acute distress, O2Sat 95% on RA. CXR: NAP. CTA Chest: No PE. Diffuse bilateral airspace disease consistent with COVID-19 pneumonia. - Admit to Telemetry  - Vital signs per routine   - Decadron 6 mg IV   - Vitamin C 500 mg PO BID daily  - Zinc 50 mg PO daily  - Lipitor 20 mg PO daily  - CBC and CMP  - Covid labs  - We will do home O2 assessment with RT and determine if pt needs home O2. If no need or less than 4 L he will be discharged home. Elevated Lactid Acidosis: Resolved. POA LA: 2.4>>1.9. Likely secondary IVVD. - Repeat LA in 4 hours. Bilateral Adrenal Nodules: Seen in CTA Chest. CT abdomen from 2009 showed lobular enlargement of the adrenal glands with a 2 cm mass projecting off the lateral limb of the left adrenal gland.   - Pt to follow up as OP with PCP and decide/discuss further management     Hypertension: POA BP: 130/84  - Continue Home Lisinopril 20 mg PO daily. - Will continue to monitor at this time and readjust as BP's trend. Hyperlipidemia:   Lab Results   Component Value Date/Time    Cholesterol, total 205 (H) 06/17/2020 12:13 PM    HDL Cholesterol 57 06/17/2020 12:13 PM    LDL, calculated 130.8 (H) 06/17/2020 12:13 PM    VLDL, calculated 17.2 06/17/2020 12:13 PM    Triglyceride 86 06/17/2020 12:13 PM    CHOL/HDL Ratio 3.6 06/17/2020 12:13 PM   anageme  - Home Atorvastatin 40 mg PO daily     DM2 : Last HgA1C: 6.4 on 6/17/2020   - Hold home Janumet  mg PO BID daily   - Hold Amaryl 4 mg PO dialy  - Pt likely to be discharge. Will decide later.    - Insulin Sliding Scale w/ high sensitivity with AC&HS glucose checks. - Hypoglycemia protocols ordered. - HgbA1C     Vit D deficiency: Vit D: 20.1 on 6/17/20  - Follow up as OP.   - Vit D level    Obesity: Body mass index is 29.55 kg/m². - Encourage lifestyle modification and further follow up with PCP outpatient. FEN/GI: Diabetic diet. Activity: Ambulate with assistance. DVT prophylaxis: Lovenox   GI prophylaxis:  Pepcid   Disposition: Plan to d/c to TBD. PT/OT consulted. POC: Wife (Kailey Saleem) [phone]    CODE STATUS:  FULL.        Patient discussed with Dr. Author Galindo (Family Medicine Attending)       Mervin Vazquez, [address] Problems  Date Reviewed: 6/17/2020        Codes Class Noted POA    * (Principal) COVID-19 virus infection ICD-10-CM: U07.1  ICD-9-CM: 079.89  10/12/2021 Unknown        Lactic acid blood increased ICD-10-CM: R79.89  ICD-9-CM: 790.6  10/12/2021 Unknown        Adrenal nodule ([facility] ICD-10-CM: E27.8  ICD-9-CM: 255.8  10/12/2021 Unknown        Malignant neoplasm of prostate ([facility] ICD-10-CM: C61  ICD-9-CM: 185  9/25/2012 Yes        DM type 2 (diabetes mellitus, type 2) ([facility] ICD-10-CM: E11.9  ICD-9-CM: 250.00  6/10/2009 Yes        Mixed hyperlipidemia ICD-10-CM: X05.1  ICD-9-CM: 272.2  6/10/2009 Yes        HTN (hypertension) ICD-10-CM: I10  ICD-9-CM: 401.9  6/10/2009 Yes        Vitamin D deficiency ICD-10-CM: E55.9  ICD-9-CM: 268.9  6/10/2009 Yes

## 2021-10-12 NOTE — PROGRESS NOTES
10/12/21 1224   Resting (Room Air)   SpO2 94   HR 94   During Walk (Room Air)   SpO2 91      After Walk   SpO2 92   HR 99   Comments walked in room.  returned to bed on room air   Does the Patient Qualify for Home O2 No

## 2021-10-12 NOTE — PROGRESS NOTES
10/12/2021  8:10 AM  Case management note    Patient being discharged before could see for eval.  Wife is picking up  Patient did not require [place]    Wife called regarding patient going home. She states his Medicare will pay for ride home.   We will update her when ready to discharge  [address] [facility],[address]

## 2021-10-13 ENCOUNTER — TELEPHONE (OUTPATIENT)
Dept: FAMILY MEDICINE CLINIC | Age: 71
End: 2021-10-13

## 2021-10-13 ENCOUNTER — PATIENT OUTREACH (OUTPATIENT)
Dept: CASE MANAGEMENT | Age: 71
End: 2021-10-13

## 2021-10-13 NOTE — TELEPHONE ENCOUNTER
Spoke to pt's wife and she let us know pt has tested positive for covid and was in [facility] ER yesterday. I talked to her about the recommendation Dr Magaly Montgomery suggests for covid pt's. She understood. We went over medications pt was sent home from ER with as she had some questions. I also went over reasons to return to the ER.

## 2021-10-13 NOTE — PROGRESS NOTES
Care Transitions Outreach Attempt    Call within 2 business days of discharge: Yes   Attempted to reach patient for transitions of care follow up. Unable to reach patient. Patient: Pearson Peabody Patient : 1950 MRN: [id number]    Last Discharge [address]       Complaint Diagnosis Description Type Department Provider    10/12/21 Positive For Covid-19; Fever COVID-19 virus infection . .. ED (ADMIT) RAMSES Gonzalez MD; Rama Segal. .. Was this an external facility discharge?  No       Noted following upcoming appointments from discharge chart review:   [facility] follow up appointment(s):   Future Appointments   Date Time Provider Ana Quiñones   10/14/2021  3:15 PM Davis Boggs MD PMA [facility]B     Non-[facility] follow up appointment(s): none

## 2021-10-14 ENCOUNTER — VIRTUAL VISIT (OUTPATIENT)
Dept: FAMILY MEDICINE CLINIC | Age: 71
End: 2021-10-14
Payer: MEDICARE

## 2021-10-14 DIAGNOSIS — N18.1 TYPE 2 DIABETES MELLITUS WITH STAGE 1 CHRONIC KIDNEY DISEASE, WITHOUT LONG-TERM CURRENT USE OF INSULIN (HCC): ICD-10-CM

## 2021-10-14 DIAGNOSIS — U07.1 COVID-19 VIRUS INFECTION: ICD-10-CM

## 2021-10-14 DIAGNOSIS — E11.22 TYPE 2 DIABETES MELLITUS WITH STAGE 1 CHRONIC KIDNEY DISEASE, WITHOUT LONG-TERM CURRENT USE OF INSULIN (HCC): ICD-10-CM

## 2021-10-14 DIAGNOSIS — U07.1 COVID-19: Primary | ICD-10-CM

## 2021-10-14 DIAGNOSIS — E11.9 TYPE 2 DIABETES MELLITUS WITHOUT COMPLICATION, WITHOUT LONG-TERM CURRENT USE OF INSULIN (HCC): ICD-10-CM

## 2021-10-14 DIAGNOSIS — I10 PRIMARY HYPERTENSION: ICD-10-CM

## 2021-10-14 LAB
ATRIAL RATE: 108 BPM
CALCULATED P AXIS, ECG09: 59 DEGREES
CALCULATED R AXIS, ECG10: -24 DEGREES
CALCULATED T AXIS, ECG11: 54 DEGREES
DIAGNOSIS, 93000: NORMAL
P-R INTERVAL, ECG05: 148 MS
Q-T INTERVAL, ECG07: 316 MS
QRS DURATION, ECG06: 74 MS
QTC CALCULATION (BEZET), ECG08: 423 MS
VENTRICULAR RATE, ECG03: 108 BPM

## 2021-10-14 PROCEDURE — 99443 PR PHYS/QHP TELEPHONE EVALUATION 21-30 MIN: CPT | Performed by: INTERNAL MEDICINE

## 2021-10-14 RX ORDER — GLIMEPIRIDE 4 MG/1
TABLET ORAL
Qty: 90 TABLET | Refills: 3 | Status: SHIPPED | OUTPATIENT
Start: 2021-10-14 | End: 2022-10-14

## 2021-10-14 NOTE — PROGRESS NOTES
Chief Complaint   Patient presents with    Positive For Covid-19     Not vaccinated. Hospitalized x 1 day. Home with dexamethasone         Kirsten Hightower is a 73 y.o. male who was seen by synchronous (real-time) TELEPHONE CALL on 10/14/2021 for Positive For Covid-19 (Not vaccinated. Hospitalized x 1 day. Home with dexamethasone)   Kirsten Hightower, who was evaluated through a patient-initiated, synchronous (real-time) audio-video encounter, and/or his healthcare decision maker, is aware that it is a billable service, with coverage as determined by his insurance carrier. He provided verbal consent to proceed: Yes, and patient identification was verified. It was conducted pursuant to the emergency declaration under the [address] [place] [address] and the [employer] Act. A caregiver was present when appropriate. Ability to conduct physical exam was limited. I was in the office. The patient was at home. Jory Warren MD       Assessment & Plan:   Diagnoses and all orders for this visit:    1. COVID-19   Supportive care. Monitor O2 sats. Vitamins: Zinc, C and D as discussed below. Follow up if symptoms worsen or breathing becomes more difficult. Complete dexamethasone     2. Type 2 diabetes mellitus without complication, without long-term current use of insulin (HCC)  -     glimepiride (AMARYL) 4 mg tablet; TAKE 1 TABLET BY MOUTH EVERY DAY IN THE MORNING  - Monitor BS carefully in the setting of COVID-19. I spent at least 22 minutes on this visit with this established patient. We discussed the expected course, resolution and complications of the diagnosis(es) in detail. Medication risks, benefits, costs, interactions, and alternatives were discussed as indicated. I advised him to contact the office if his condition worsens, changes or fails to improve as anticipated.  He expressed understanding Mess left for pt to call office   with the diagnosis(es) and plan. Follow-up and Dispositions    · Return if symptoms worsen or fail to improve. Subjective:   71M with T2DM, HTN and recently diagnosed with COVID-19 on 10/9/2021. He is unvaccinated. O2 sats are stable, low normal at 90-19%. Has a pulse oximeter at home. Was evaluated in the ER and felt stable to go home to convalesce. He did have a cxr:    XR Results (most recent):  Results from Hospital Encounter encounter on 10/12/21    XR CHEST PORT    Narrative  Clinical history: covid+  INDICATION:   covid+  COMPARISON: 10/30/2009    FINDINGS:  AP portable upright view of the chest demonstrates a stable  cardiopericardial  silhouette. There is no pleural effusion. .There is no focal consolidation. .There  is no pneumothorax. .    Impression  No acute process identified. Plan to repeat AP lateral xray in 6-8 weeks if still having symptoms. He is home with dexamethasone, Zinc and Vitamin C. We discussed adding vitamin D to his regimen as well. He is requesting a refill of the Glimepiride. Reports that BS have been stable and no hypoglycemia. Able to eat and drink well.            Patient Active Problem List    Diagnosis Date Noted    COVID-19 virus infection 10/12/2021    Lactic acid blood increased 10/12/2021    Adrenal nodule ([address] 10/12/2021    Aortic stenosis 05/04/2015    Bradycardia 04/23/2014    Malignant neoplasm of prostate ([address] 09/25/2012    Other atopic dermatitis and related conditions 01/26/2011    Hypogonadism male 09/13/2010    Type 2 diabetes mellitus with chronic kidney disease ([address] 06/10/2009    Mixed hyperlipidemia 06/10/2009    HTN (hypertension) 06/10/2009    Vitamin D deficiency 06/10/2009    Cardiac murmur 06/10/2009     Current Outpatient Medications   Medication Sig Dispense Refill    glimepiride (AMARYL) 4 mg tablet TAKE 1 TABLET BY MOUTH EVERY DAY IN THE MORNING 90 Tablet 3    atorvastatin (LIPITOR) 40 mg tablet Take 40 mg by mouth nightly.  hydrocortisone (HYTONE) 2.5 % topical cream Apply  to affected area two (2) times daily as needed (Itching). use thin layer      ascorbic acid, vitamin C, (VITAMIN C) 500 mg tablet Take 1 Tablet by mouth two (2) times a day. 20 Tablet 0    zinc gluconate 50 mg tablet Take 1 Tablet by mouth daily. 10 Tablet 0    dexAMETHasone (DECADRON) 6 mg tablet Take 1 Tablet by mouth once over twenty-four (24) hours. 9 Tablet 0    famotidine (PEPCID) 20 mg tablet Take 1 Tablet by mouth two (2) times a day. 20 Tablet 0    Janumet  mg per tablet TAKE 1 TABLET BY MOUTH TWICE A DAY WITH MEALS 180 Tablet 0    lisinopriL (PRINIVIL, ZESTRIL) 20 mg tablet TAKE 1 TABLET BY MOUTH EVERY DAY 90 Tablet 1    CIALIS 5 mg tablet Take 1 Tab by mouth daily as needed. 24 Tab 1    aspirin delayed-release 81 mg tablet Take 81 mg by mouth daily.  omega-3 fatty acids cap Take 1,000 mg by mouth daily. No Known Allergies  Past Medical History:   Diagnosis Date    Colon polyps     Contact dermatitis and other eczema, due to unspecified cause     DM type [facility] 6/10/2009    Essential hypertension, benign     Hypertension     Malignant ne[facility] of [facility] 9/25/2012    Mixed hyperlipidemia 6/10/2009    Mixed hyperlipidemia 6/10/2009    Prostate cancer ([facility] 11/2009    resection prostate     Past Surgical History:   Procedure Laterality Date    HX PROSTATECTOMY       Family History   Problem Relation Age of Onset    Diabetes Mother     Hypertension Mother     Diabetes Father      Social History     Tobacco Use    Smoking status: Never Smoker    Smokeless tobacco: Never Used   Substance Use Topics    Alcohol use: No       Review of Systems   Constitutional: Negative. HENT: Positive for congestion. Eyes: Negative. Respiratory: Positive for cough, sputum production and shortness of breath (mild). Cardiovascular: Negative. Gastrointestinal: Negative. Genitourinary: Negative. Musculoskeletal: Negative. Skin: Negative. Neurological: Negative. Endo/Heme/Allergies: Negative. Psychiatric/Behavioral: Negative. All other systems reviewed and are negative. Objective: There were no vitals taken for this visit.      General: alert, cooperative, no distress   Mental  status: normal mood, behavior, speech, and thought processes, able to follow commands   Psychiatric: normal affect, consistent with stated mood, no evidence of hallucinations

## 2021-10-18 LAB
BACTERIA SPEC CULT: NORMAL
BACTERIA SPEC CULT: NORMAL
SERVICE CMNT-IMP: NORMAL
SERVICE CMNT-IMP: NORMAL

## 2021-11-01 ENCOUNTER — PATIENT OUTREACH (OUTPATIENT)
Dept: CASE MANAGEMENT | Age: 71
End: 2021-11-01

## 2021-11-26 RX ORDER — SITAGLIPTIN AND METFORMIN HYDROCHLORIDE 500; 50 MG/1; MG/1
TABLET, FILM COATED ORAL
Qty: 180 TABLET | Refills: 0 | Status: SHIPPED | OUTPATIENT
Start: 2021-11-26 | End: 2022-02-25

## 2021-11-29 DIAGNOSIS — I10 ESSENTIAL HYPERTENSION WITH GOAL BLOOD PRESSURE LESS THAN 140/90: ICD-10-CM

## 2021-11-29 RX ORDER — LISINOPRIL 20 MG/1
TABLET ORAL
Qty: 90 TABLET | Refills: 1 | Status: SHIPPED | OUTPATIENT
Start: 2021-11-29 | End: 2022-05-30

## 2021-12-17 RX ORDER — ATORVASTATIN CALCIUM 40 MG/1
TABLET, FILM COATED ORAL
Qty: 90 TABLET | Refills: 1 | Status: SHIPPED | OUTPATIENT
Start: 2021-12-17

## 2021-12-22 RX ORDER — HYDROCORTISONE 25 MG/G
CREAM TOPICAL
Qty: 28.35 G | Refills: 3 | Status: SHIPPED | OUTPATIENT
Start: 2021-12-22

## 2022-02-25 RX ORDER — SITAGLIPTIN AND METFORMIN HYDROCHLORIDE 500; 50 MG/1; MG/1
TABLET, FILM COATED ORAL
Qty: 180 TABLET | Refills: 0 | Status: SHIPPED | OUTPATIENT
Start: 2022-02-25 | End: 2022-05-30

## 2022-03-19 PROBLEM — U07.1 COVID-19 VIRUS INFECTION: Status: ACTIVE | Noted: 2021-10-12

## 2022-03-19 PROBLEM — E27.8 ADRENAL NODULE (HCC): Status: ACTIVE | Noted: 2021-10-12

## 2022-03-19 PROBLEM — R79.89 LACTIC ACID BLOOD INCREASED: Status: ACTIVE | Noted: 2021-10-12

## 2022-03-19 PROBLEM — E27.9 ADRENAL NODULE (HCC): Status: ACTIVE | Noted: 2021-10-12

## 2022-05-30 DIAGNOSIS — I10 ESSENTIAL HYPERTENSION WITH GOAL BLOOD PRESSURE LESS THAN 140/90: ICD-10-CM

## 2022-05-30 RX ORDER — SITAGLIPTIN AND METFORMIN HYDROCHLORIDE 500; 50 MG/1; MG/1
TABLET, FILM COATED ORAL
Qty: 180 TABLET | Refills: 0 | Status: SHIPPED | OUTPATIENT
Start: 2022-05-30 | End: 2022-08-31

## 2022-05-30 RX ORDER — LISINOPRIL 20 MG/1
TABLET ORAL
Qty: 90 TABLET | Refills: 1 | Status: SHIPPED | OUTPATIENT
Start: 2022-05-30

## 2022-08-31 RX ORDER — SITAGLIPTIN AND METFORMIN HYDROCHLORIDE 500; 50 MG/1; MG/1
TABLET, FILM COATED ORAL
Qty: 180 TABLET | Refills: 0 | Status: SHIPPED | OUTPATIENT
Start: 2022-08-31

## 2022-10-14 DIAGNOSIS — E11.9 TYPE 2 DIABETES MELLITUS WITHOUT COMPLICATION, WITHOUT LONG-TERM CURRENT USE OF INSULIN (HCC): ICD-10-CM

## 2022-10-14 RX ORDER — GLIMEPIRIDE 4 MG/1
TABLET ORAL
Qty: 90 TABLET | Refills: 3 | Status: SHIPPED | OUTPATIENT
Start: 2022-10-14

## 2023-01-19 DIAGNOSIS — I10 ESSENTIAL HYPERTENSION WITH GOAL BLOOD PRESSURE LESS THAN 140/90: ICD-10-CM

## 2023-01-19 RX ORDER — LISINOPRIL 20 MG/1
TABLET ORAL
Qty: 30 TABLET | Refills: 0 | Status: SHIPPED | OUTPATIENT
Start: 2023-01-19

## 2023-01-30 ENCOUNTER — OFFICE VISIT (OUTPATIENT)
Dept: FAMILY MEDICINE CLINIC | Age: 73
End: 2023-01-30
Payer: MEDICARE

## 2023-01-30 VITALS
DIASTOLIC BLOOD PRESSURE: 88 MMHG | OXYGEN SATURATION: 98 % | HEART RATE: 86 BPM | SYSTOLIC BLOOD PRESSURE: 138 MMHG | TEMPERATURE: 97.9 F | RESPIRATION RATE: 16 BRPM | BODY MASS INDEX: 30.64 KG/M2 | WEIGHT: 231.2 LBS | HEIGHT: 73 IN

## 2023-01-30 DIAGNOSIS — E11.9 TYPE 2 DIABETES MELLITUS WITHOUT COMPLICATION, WITHOUT LONG-TERM CURRENT USE OF INSULIN (HCC): Primary | ICD-10-CM

## 2023-01-30 DIAGNOSIS — E55.9 VITAMIN D DEFICIENCY: ICD-10-CM

## 2023-01-30 DIAGNOSIS — E11.22 TYPE 2 DIABETES MELLITUS WITH CHRONIC KIDNEY DISEASE, WITHOUT LONG-TERM CURRENT USE OF INSULIN, UNSPECIFIED CKD STAGE (HCC): ICD-10-CM

## 2023-01-30 DIAGNOSIS — E78.2 MIXED HYPERLIPIDEMIA: ICD-10-CM

## 2023-01-30 DIAGNOSIS — C61 MALIGNANT NEOPLASM OF PROSTATE (HCC): ICD-10-CM

## 2023-01-30 DIAGNOSIS — I10 ESSENTIAL HYPERTENSION WITH GOAL BLOOD PRESSURE LESS THAN 140/90: ICD-10-CM

## 2023-01-30 DIAGNOSIS — E27.8 ADRENAL NODULE (HCC): ICD-10-CM

## 2023-01-30 DIAGNOSIS — Z12.11 SCREENING FOR COLON CANCER: ICD-10-CM

## 2023-01-30 PROCEDURE — 99214 OFFICE O/P EST MOD 30 MIN: CPT | Performed by: INTERNAL MEDICINE

## 2023-01-30 PROCEDURE — 1123F ACP DISCUSS/DSCN MKR DOCD: CPT | Performed by: INTERNAL MEDICINE

## 2023-01-30 PROCEDURE — G8536 NO DOC ELDER MAL SCRN: HCPCS | Performed by: INTERNAL MEDICINE

## 2023-01-30 PROCEDURE — 3078F DIAST BP <80 MM HG: CPT | Performed by: INTERNAL MEDICINE

## 2023-01-30 PROCEDURE — 3017F COLORECTAL CA SCREEN DOC REV: CPT | Performed by: INTERNAL MEDICINE

## 2023-01-30 PROCEDURE — G8510 SCR DEP NEG, NO PLAN REQD: HCPCS | Performed by: INTERNAL MEDICINE

## 2023-01-30 PROCEDURE — G8417 CALC BMI ABV UP PARAM F/U: HCPCS | Performed by: INTERNAL MEDICINE

## 2023-01-30 PROCEDURE — 2022F DILAT RTA XM EVC RTNOPTHY: CPT | Performed by: INTERNAL MEDICINE

## 2023-01-30 PROCEDURE — 1101F PT FALLS ASSESS-DOCD LE1/YR: CPT | Performed by: INTERNAL MEDICINE

## 2023-01-30 PROCEDURE — 3074F SYST BP LT 130 MM HG: CPT | Performed by: INTERNAL MEDICINE

## 2023-01-30 PROCEDURE — 3046F HEMOGLOBIN A1C LEVEL >9.0%: CPT | Performed by: INTERNAL MEDICINE

## 2023-01-30 PROCEDURE — G8427 DOCREV CUR MEDS BY ELIG CLIN: HCPCS | Performed by: INTERNAL MEDICINE

## 2023-01-30 RX ORDER — HYDROCORTISONE 25 MG/G
CREAM TOPICAL 2 TIMES DAILY
Qty: 28.35 G | Refills: 3 | Status: SHIPPED | OUTPATIENT
Start: 2023-01-30

## 2023-01-30 RX ORDER — ATORVASTATIN CALCIUM 40 MG/1
TABLET, FILM COATED ORAL
Qty: 90 TABLET | Refills: 1 | Status: SHIPPED | OUTPATIENT
Start: 2023-01-30

## 2023-01-30 RX ORDER — SITAGLIPTIN AND METFORMIN HYDROCHLORIDE 500; 50 MG/1; MG/1
1 TABLET, FILM COATED ORAL 2 TIMES DAILY WITH MEALS
Qty: 180 TABLET | Refills: 1 | Status: SHIPPED | OUTPATIENT
Start: 2023-01-30

## 2023-01-30 RX ORDER — LISINOPRIL 20 MG/1
20 TABLET ORAL DAILY
Qty: 90 TABLET | Refills: 1 | Status: SHIPPED | OUTPATIENT
Start: 2023-01-30

## 2023-01-30 RX ORDER — LATANOPROST 50 UG/ML
SOLUTION/ DROPS OPHTHALMIC
COMMUNITY

## 2023-01-30 RX ORDER — GLIMEPIRIDE 4 MG/1
4 TABLET ORAL DAILY
Qty: 90 TABLET | Refills: 1 | Status: SHIPPED | OUTPATIENT
Start: 2023-01-30

## 2023-01-30 NOTE — PROGRESS NOTES
Identified pt with two pt identifiers(name and ). Chief Complaint   Patient presents with    Medication Refill    New Order     Colonoscopy         Health Maintenance Due   Topic    Depression Screen     DTaP/Tdap/Td series (1 - Tdap)    Shingles Vaccine (1 of 2)    Colorectal Cancer Screening Combo     Eye Exam Retinal or Dilated     Diabetic Alb to Cr ratio (uACR) test     Foot Exam Q1     Medicare Yearly Exam     A1C test (Diabetic or Prediabetic)     GFR test (Diabetes, CKD 3-4, OR last GFR 15-59)     Lipid Screen     COVID-19 Vaccine (2 - Pfizer series)       Wt Readings from Last 3 Encounters:   23 231 lb 3.2 oz (104.9 kg)   10/12/21 224 lb (101.6 kg)   10/09/21 212 lb 15.4 oz (96.6 kg)     Temp Readings from Last 3 Encounters:   23 97.9 °F (36.6 °C) (Temporal)   10/12/21 99.9 °F (37.7 °C)   10/09/21 (!) 101.8 °F (38.8 °C)     BP Readings from Last 3 Encounters:   23 (!) 152/90   10/12/21 122/76   10/09/21 (!) 161/80     Pulse Readings from Last 3 Encounters:   23 86   10/12/21 95   10/09/21 87         Learning Assessment:  :     Learning Assessment 2016   PRIMARY LEARNER Patient Patient   HIGHEST LEVEL OF EDUCATION - PRIMARY LEARNER  GRADUATED HIGH SCHOOL OR GED -   BARRIERS PRIMARY LEARNER NONE -   CO-LEARNER CAREGIVER No -   PRIMARY LANGUAGE ENGLISH ENGLISH   LEARNER PREFERENCE PRIMARY OTHER (COMMENT) DEMONSTRATION   ANSWERED BY self patient   RELATIONSHIP SELF SELF       Depression Screening:  :     3 most recent PHQ Screens 2023   Little interest or pleasure in doing things Not at all   Feeling down, depressed, irritable, or hopeless Not at all   Total Score PHQ 2 0       Fall Risk Assessment:  :     Fall Risk Assessment, last 12 mths 2023   Able to walk? Yes   Fall in past 12 months? 0   Do you feel unsteady?  0   Are you worried about falling 0       Abuse Screening:  :     Abuse Screening Questionnaire 2023 4/21/2014   Do you ever feel afraid of your partner? N N N N N   Are you in a relationship with someone who physically or mentally threatens you? N N N N N   Is it safe for you to go home? Y Y Y Y Y       Coordination of Care Questionnaire:  :     1) Have you been to an emergency room, urgent care clinic since your last visit? no   Hospitalized since your last visit? no             2) Have you seen or consulted any other health care providers outside of [address] [place] [address] since your last visit? Dr Angy Flood (Include any pap smears or colon screenings in this section.)    3) Do you have an Advance Directive on file? no  Are you interested in receiving information about Advance Directives? no    Patient is accompanied by self I have received verbal consent from Aime Camp to discuss any/all medical information while they are present in the room. 4.  For patients aged 39-70: Has the patient had a colonoscopy / FIT/ Cologuard? No      If the patient is female:    5. For patients aged 41-77: Has the patient had a mammogram within the past 2 years? NA - based on age or sex      10. For patients aged 21-65: Has the patient had a pap smear?  NA - based on age or sex

## 2023-01-31 ENCOUNTER — LAB ONLY (OUTPATIENT)
Dept: FAMILY MEDICINE CLINIC | Age: 73
End: 2023-01-31

## 2023-01-31 DIAGNOSIS — E11.9 TYPE 2 DIABETES MELLITUS WITHOUT COMPLICATION, WITHOUT LONG-TERM CURRENT USE OF INSULIN (HCC): ICD-10-CM

## 2023-01-31 DIAGNOSIS — E78.2 MIXED HYPERLIPIDEMIA: ICD-10-CM

## 2023-01-31 DIAGNOSIS — E55.9 VITAMIN D DEFICIENCY: ICD-10-CM

## 2023-01-31 NOTE — PROGRESS NOTES
Chief Complaint   Patient presents with    Medication Refill    New Order     Colonoscopy        ASSESSMENT:  Diabetes Mellitus: stable    Diagnoses and all orders for this visit:    1. Type 2 diabetes mellitus without complication, without long-term current use of insulin (HCC)  -     SITagliptin-metFORMIN (Janumet)  mg per tablet; Take 1 Tablet by mouth two (2) times daily (with meals). -     glimepiride (AMARYL) 4 mg tablet; Take 1 Tablet by mouth daily.  -     HEMOGLOBIN A1C WITH EAG; Future  -     MICROALBUMIN, UR, RAND W/ MICROALB/CREAT RATIO; Future  -     HM DIABETES FOOT EXAM    2. Essential hypertension with goal blood pressure less than 140/90  -     lisinopriL (PRINIVIL, ZESTRIL) 20 mg tablet; Take 1 Tablet by mouth daily. 3. Mixed hyperlipidemia  -     atorvastatin (LIPITOR) 40 mg tablet; TAKE 1 TAB BY MOUTH DAILY FOR 90 DAYS. INDICATIONS: HIGH CHOLESTEROL AND HIGH TRIGLYCERIDES  -     METABOLIC PANEL, COMPREHENSIVE; Future  -     CBC WITH AUTOMATED DIFF; Future  -     LIPID PANEL; Future    4. Vitamin D deficiency  -     VITAMIN D, 25 HYDROXY; Future    5. Screening for colon cancer  -     REFERRAL TO GASTROENTEROLOGY    6. Type 2 diabetes mellitus with chronic kidney disease, without long-term current use of insulin, unspecified CKD stage [facility])  Assessment & Plan:  unclear control, continue current plan pending work up below  Lab Results   Component Value Date/Time    Hemoglobin A1c 6.1 (H) 10/12/2021 07:50 AM    Hemoglobin A1c (POC) 6.0 04/20/2017 09:04 AM     Lab Results   Component Value Date/Time    GFR est AA >60 10/12/2021 07:50 AM    GFR est non-AA 58 (L) 10/12/2021 07:50 AM    Creatinine 1.23 10/12/2021 07:50 AM    BUN 22 (H) 10/12/2021 07:50 AM    Sodium 133 (L) 10/12/2021 07:50 AM    Potassium 4.8 10/12/2021 07:50 AM    Chloride 101 10/12/2021 07:50 AM    CO2 25 10/12/2021 07:50 AM     7.  Adrenal nodule [facility])  Assessment & Plan:  asymptomatic, continue current medications, continue current treatment plan    8. Malignant neoplasm of prostate [facility])  Assessment & Plan:  monitored by specialist. No acute findings meriting change in the plan    Other orders  -     hydrocortisone (HYTONE) 2.5 % topical cream; Apply  to affected area two (2) times a day. Use thin layer. I have discussed the diagnosis with the patient and the intended treatment plan as seen in the above orders. The patient has received an after-visit summary and questions were answered concerning future plans. Asked to return should symptoms worsen or not improve with treatment. Any pending labs and studies will be relayed to patient when they become available. Pt verbalizes understanding of plan of care and denies further questions or concerns at this time. Follow-up and Dispositions    Return in about 1 year (around 1/30/2024), or if symptoms worsen or fail to improve  Every 4-6 months for diabetic care. Diabetic Goals:  HgA1C <7,  LDL cholesterol <100, Blood pressure <140/80. The patient is asked to make an attempt to improve diet and exercise patterns to aid in medical management of this problem. Potential long term end organ damage is discussed as well. I also recommend yearly eye exams and daily foot care. PLAN:  See orders for this visit as documented in the electronic medical record. Issues reviewed with him: home glucose monitoring emphasized, foot care discussed and Podiatry visits discussed, annual eye examinations at Ophthalmology discussed, glycohemoglobin and other lab monitoring discussed, long term diabetic complications discussed, and labs immediately prior to next visit. I have discussed the diagnosis with the patient and the intended treatment plan as seen in the above orders. The patient has received an after-visit summary and questions were answered concerning future plans. Asked to return should symptoms worsen or not improve with treatment.  Any pending labs and studies will be relayed to patient when they become available. Pt verbalizes understanding of plan of care and denies further questions or concerns at this time. Follow-up and Dispositions    Return in about 1 year (around 1/30/2024), or if symptoms worsen or fail to improve, for   Every 3 months for diabetic care. SUBJECTIVE:  81 y.o. male for follow up of diabetes. Diabetic Review of Systems - medication compliance: compliant most of the time, diabetic diet compliance: compliant most of the time, home glucose monitoring: is performed sporadically. Other symptoms and concerns: none. Needs order for colonoscopy. Diabetic Report Card  Diabetic Goals:  HgA1C <7,  LDL cholesterol <100, Blood pressure <140/80. Lab Results   Component Value Date/Time    Hemoglobin A1c 6.1 (H) 10/12/2021 07:50 AM    Hemoglobin A1c 6.4 (H) 06/17/2020 12:13 PM    Hemoglobin A1c 5.9 (H) 12/20/2018 11:03 AM     Lab Results   Component Value Date/Time    Cholesterol, total 98 10/12/2021 07:50 AM    HDL Cholesterol 43 10/12/2021 07:50 AM    LDL, calculated 37.4 10/12/2021 07:50 AM    VLDL, calculated 17.6 10/12/2021 07:50 AM    Triglyceride 88 10/12/2021 07:50 AM    CHOL/HDL Ratio 2.3 10/12/2021 07:50 AM     Lab Results   Component Value Date/Time    Microalbumin/Creat ratio (mg/g creat) 17 06/17/2020 12:30 PM    Microalbumin,urine random 3.91 06/17/2020 12:30 PM    Microalbumin urine (POC) 80 05/24/2016 10:19 AM       I also recommended yearly eye exams and daily foot care. Current Outpatient Medications   Medication Sig Dispense Refill    latanoprost (XALATAN) 0.005 % ophthalmic solution latanoprost 0.005 % eye drops   INSTILL 1 DROP INTO BOTH EYES EVERY DAY AT BEDTIME      lisinopriL (PRINIVIL, ZESTRIL) 20 mg tablet Take 1 Tablet by mouth daily. 90 Tablet 1    SITagliptin-metFORMIN (Janumet)  mg per tablet Take 1 Tablet by mouth two (2) times daily (with meals).  180 Tablet 1    glimepiride (AMARYL) 4 mg tablet Take 1 Tablet by mouth daily. 90 Tablet 1    atorvastatin (LIPITOR) 40 mg tablet TAKE 1 TAB BY MOUTH DAILY FOR 90 DAYS. INDICATIONS: HIGH CHOLESTEROL AND HIGH TRIGLYCERIDES 90 Tablet 1    hydrocortisone (HYTONE) 2.5 % topical cream Apply  to affected area two (2) times a day. Use thin layer. 28.35 g 3    ascorbic acid, vitamin C, (VITAMIN C) 500 mg tablet Take 1 Tablet by mouth two (2) times a day. 20 Tablet 0    zinc gluconate 50 mg tablet Take 1 Tablet by mouth daily. 10 Tablet 0    dexAMETHasone (DECADRON) 6 mg tablet Take 1 Tablet by mouth once over twenty-four (24) hours. 9 Tablet 0    famotidine (PEPCID) 20 mg tablet Take 1 Tablet by mouth two (2) times a day. 20 Tablet 0    CIALIS 5 mg tablet Take 1 Tab by mouth daily as needed. 24 Tab 1    aspirin delayed-release 81 mg tablet Take 81 mg by mouth daily. omega-3 fatty acids cap Take 1,000 mg by mouth daily. OBJECTIVE:  Visit Vitals  /88 (BP 1 Location: Right upper arm, BP Patient Position: Sitting)   Pulse 86   Temp 97.9 °F (36.6 °C) (Temporal)   Resp 16   Ht 6' 1\" (1.854 m)   Wt 231 lb 3.2 oz (104.9 kg)   SpO2 98%   BMI 30.50 kg/m²       General appearance: alert, well appearing, and in no distress. Chest: clear to auscultation, no wheezes, rales or rhonchi, symmetric air entry. CVS exam: normal rate, regular rhythm, normal S1, S2, no murmurs, rubs, clicks or gallops. Abdominal exam: soft, nontender, nondistended, no masses or organomegaly. Exam of extremities: peripheral pulses normal, no pedal edema, no clubbing or cyanosis  Skin exam - normal coloration and turgor, no rashes, no suspicious skin lesions noted. Neurological exam reveals alert, oriented, normal speech, no focal findings or movement disorder noted.     No Gallegos MD  [facility]  01/31/23

## 2023-01-31 NOTE — ASSESSMENT & PLAN NOTE
unclear control, continue current plan pending work up below  Lab Results   Component Value Date/Time    Hemoglobin A1c 6.1 (H) 10/12/2021 07:50 AM    Hemoglobin A1c (POC) 6.0 04/20/2017 09:04 AM

## 2023-02-01 LAB
25(OH)D3 SERPL-MCNC: 41.5 NG/ML (ref 30–100)
ALBUMIN SERPL-MCNC: 4.4 G/DL (ref 3.5–5)
ALBUMIN/GLOB SERPL: 1.3 (ref 1.1–2.2)
ALP SERPL-CCNC: 54 U/L (ref 45–117)
ALT SERPL-CCNC: 23 U/L (ref 12–78)
ANION GAP SERPL CALC-SCNC: 5 MMOL/L (ref 5–15)
AST SERPL-CCNC: 11 U/L (ref 15–37)
BASOPHILS # BLD: 0 K/UL (ref 0–0.1)
BASOPHILS NFR BLD: 0 % (ref 0–1)
BILIRUB SERPL-MCNC: 0.3 MG/DL (ref 0.2–1)
BUN SERPL-MCNC: 14 MG/DL (ref 6–20)
BUN/CREAT SERPL: 14 (ref 12–20)
CALCIUM SERPL-MCNC: 9.6 MG/DL (ref 8.5–10.1)
CHLORIDE SERPL-SCNC: 107 MMOL/L (ref 97–108)
CHOLEST SERPL-MCNC: 215 MG/DL
CO2 SERPL-SCNC: 27 MMOL/L (ref 21–32)
CREAT SERPL-MCNC: 0.98 MG/DL (ref 0.7–1.3)
CREAT UR-MCNC: 130 MG/DL
DIFFERENTIAL METHOD BLD: ABNORMAL
EOSINOPHIL # BLD: 0.1 K/UL (ref 0–0.4)
EOSINOPHIL NFR BLD: 1 % (ref 0–7)
ERYTHROCYTE [DISTWIDTH] IN BLOOD BY AUTOMATED COUNT: 13.8 % (ref 11.5–14.5)
EST. AVERAGE GLUCOSE BLD GHB EST-MCNC: 137 MG/DL
GLOBULIN SER CALC-MCNC: 3.3 G/DL (ref 2–4)
GLUCOSE SERPL-MCNC: 120 MG/DL (ref 65–100)
HBA1C MFR BLD: 6.4 % (ref 4–5.6)
HCT VFR BLD AUTO: 41.8 % (ref 36.6–50.3)
HDLC SERPL-MCNC: 63 MG/DL
HDLC SERPL: 3.4 (ref 0–5)
HGB BLD-MCNC: 13.5 G/DL (ref 12.1–17)
IMM GRANULOCYTES # BLD AUTO: 0 K/UL (ref 0–0.04)
IMM GRANULOCYTES NFR BLD AUTO: 0 % (ref 0–0.5)
LDLC SERPL CALC-MCNC: 139.2 MG/DL (ref 0–100)
LYMPHOCYTES # BLD: 4.7 K/UL (ref 0.8–3.5)
LYMPHOCYTES NFR BLD: 46 % (ref 12–49)
MCH RBC QN AUTO: 31.8 PG (ref 26–34)
MCHC RBC AUTO-ENTMCNC: 32.3 G/DL (ref 30–36.5)
MCV RBC AUTO: 98.6 FL (ref 80–99)
MICROALBUMIN UR-MCNC: 4.2 MG/DL
MICROALBUMIN/CREAT UR-RTO: 32 MG/G (ref 0–30)
MONOCYTES # BLD: 0.8 K/UL (ref 0–1)
MONOCYTES NFR BLD: 8 % (ref 5–13)
NEUTS SEG # BLD: 4.6 K/UL (ref 1.8–8)
NEUTS SEG NFR BLD: 45 % (ref 32–75)
NRBC # BLD: 0 K/UL (ref 0–0.01)
NRBC BLD-RTO: 0 PER 100 WBC
PLATELET # BLD AUTO: 271 K/UL (ref 150–400)
PMV BLD AUTO: 10.9 FL (ref 8.9–12.9)
POTASSIUM SERPL-SCNC: 4.6 MMOL/L (ref 3.5–5.1)
PROT SERPL-MCNC: 7.7 G/DL (ref 6.4–8.2)
RBC # BLD AUTO: 4.24 M/UL (ref 4.1–5.7)
SODIUM SERPL-SCNC: 139 MMOL/L (ref 136–145)
TRIGL SERPL-MCNC: 64 MG/DL (ref ?–150)
VLDLC SERPL CALC-MCNC: 12.8 MG/DL
WBC # BLD AUTO: 10.3 K/UL (ref 4.1–11.1)

## 2023-02-02 ENCOUNTER — TELEPHONE (OUTPATIENT)
Dept: FAMILY MEDICINE CLINIC | Age: 73
End: 2023-02-02

## 2023-02-02 NOTE — TELEPHONE ENCOUNTER
----- Message from Sj Diaz MD sent at 2/2/2023 12:51 PM EST -----  Please let the patient know that his labs were stable. His cholesterol was a little more elevated than previously, but this could be because he ran out of medications. Continue to take his medications as prescribed. Follow up in 4-months for repeat labs. Thanks!

## 2023-02-02 NOTE — PROGRESS NOTES
Please let the patient know that his labs were stable. His cholesterol was a little more elevated than previously, but this could be because he ran out of medications. Continue to take his medications as prescribed. Follow up in 4-months for repeat labs. Thanks!

## 2023-07-28 DIAGNOSIS — E78.2 MIXED HYPERLIPIDEMIA: ICD-10-CM

## 2023-07-28 RX ORDER — ATORVASTATIN CALCIUM 40 MG/1
TABLET, FILM COATED ORAL
Qty: 90 TABLET | Refills: 1 | Status: SHIPPED | OUTPATIENT
Start: 2023-07-28

## 2023-08-06 DIAGNOSIS — I10 ESSENTIAL (PRIMARY) HYPERTENSION: ICD-10-CM

## 2023-08-06 RX ORDER — LISINOPRIL 20 MG/1
TABLET ORAL
Qty: 90 TABLET | Refills: 1 | Status: SHIPPED | OUTPATIENT
Start: 2023-08-06

## 2023-08-21 DIAGNOSIS — E11.9 TYPE 2 DIABETES MELLITUS WITHOUT COMPLICATIONS (HCC): ICD-10-CM

## 2023-08-21 RX ORDER — SITAGLIPTIN AND METFORMIN HYDROCHLORIDE 500; 50 MG/1; MG/1
TABLET, FILM COATED ORAL
Qty: 180 TABLET | Refills: 1 | Status: SHIPPED | OUTPATIENT
Start: 2023-08-21

## 2023-10-14 DIAGNOSIS — E11.9 TYPE 2 DIABETES MELLITUS WITHOUT COMPLICATIONS (HCC): ICD-10-CM

## 2023-10-15 RX ORDER — GLIMEPIRIDE 4 MG/1
4 TABLET ORAL DAILY
Qty: 90 TABLET | Refills: 1 | Status: SHIPPED | OUTPATIENT
Start: 2023-10-15

## 2024-01-31 DIAGNOSIS — I10 ESSENTIAL (PRIMARY) HYPERTENSION: ICD-10-CM

## 2024-01-31 DIAGNOSIS — E78.2 MIXED HYPERLIPIDEMIA: ICD-10-CM

## 2024-01-31 RX ORDER — ATORVASTATIN CALCIUM 40 MG/1
TABLET, FILM COATED ORAL
Qty: 90 TABLET | Refills: 1 | Status: SHIPPED | OUTPATIENT
Start: 2024-01-31

## 2024-01-31 RX ORDER — LISINOPRIL 20 MG/1
TABLET ORAL
Qty: 90 TABLET | Refills: 1 | Status: SHIPPED | OUTPATIENT
Start: 2024-01-31

## 2024-02-26 DIAGNOSIS — E11.9 TYPE 2 DIABETES MELLITUS WITHOUT COMPLICATIONS (HCC): ICD-10-CM

## 2024-02-26 RX ORDER — SITAGLIPTIN AND METFORMIN HYDROCHLORIDE 500; 50 MG/1; MG/1
1 TABLET, FILM COATED ORAL 2 TIMES DAILY WITH MEALS
Qty: 180 TABLET | Refills: 1 | OUTPATIENT
Start: 2024-02-26

## 2024-03-29 DIAGNOSIS — E11.9 TYPE 2 DIABETES MELLITUS WITHOUT COMPLICATIONS (HCC): ICD-10-CM

## 2024-03-31 RX ORDER — SITAGLIPTIN AND METFORMIN HYDROCHLORIDE 500; 50 MG/1; MG/1
1 TABLET, FILM COATED ORAL 2 TIMES DAILY WITH MEALS
Qty: 60 TABLET | Refills: 0 | Status: SHIPPED | OUTPATIENT
Start: 2024-03-31

## 2024-03-31 RX ORDER — GLIMEPIRIDE 4 MG/1
4 TABLET ORAL DAILY
Qty: 30 TABLET | Refills: 0 | Status: SHIPPED | OUTPATIENT
Start: 2024-03-31

## 2024-04-25 DIAGNOSIS — E11.9 TYPE 2 DIABETES MELLITUS WITHOUT COMPLICATIONS (HCC): ICD-10-CM

## 2024-04-25 RX ORDER — GLIMEPIRIDE 4 MG/1
4 TABLET ORAL DAILY
Qty: 90 TABLET | Refills: 1 | OUTPATIENT
Start: 2024-04-25

## 2024-04-28 DIAGNOSIS — E11.9 TYPE 2 DIABETES MELLITUS WITHOUT COMPLICATIONS (HCC): ICD-10-CM

## 2024-04-29 RX ORDER — SITAGLIPTIN AND METFORMIN HYDROCHLORIDE 500; 50 MG/1; MG/1
1 TABLET, FILM COATED ORAL 2 TIMES DAILY WITH MEALS
Qty: 60 TABLET | Refills: 0 | Status: SHIPPED | OUTPATIENT
Start: 2024-04-29

## 2024-05-13 ENCOUNTER — NURSE ONLY (OUTPATIENT)
Age: 74
End: 2024-05-13
Payer: MEDICARE

## 2024-05-13 ENCOUNTER — OFFICE VISIT (OUTPATIENT)
Age: 74
End: 2024-05-13
Payer: MEDICARE

## 2024-05-13 VITALS
SYSTOLIC BLOOD PRESSURE: 122 MMHG | RESPIRATION RATE: 17 BRPM | HEART RATE: 69 BPM | OXYGEN SATURATION: 98 % | BODY MASS INDEX: 28.79 KG/M2 | DIASTOLIC BLOOD PRESSURE: 76 MMHG | WEIGHT: 217.2 LBS | TEMPERATURE: 98.3 F | HEIGHT: 73 IN

## 2024-05-13 DIAGNOSIS — N18.32 TYPE 2 DIABETES MELLITUS WITH STAGE 3B CHRONIC KIDNEY DISEASE, WITHOUT LONG-TERM CURRENT USE OF INSULIN (HCC): Primary | ICD-10-CM

## 2024-05-13 DIAGNOSIS — Z12.5 SCREENING FOR PROSTATE CANCER: ICD-10-CM

## 2024-05-13 DIAGNOSIS — D35.02 ADRENAL ADENOMA, LEFT: ICD-10-CM

## 2024-05-13 DIAGNOSIS — E11.22 TYPE 2 DIABETES MELLITUS WITH STAGE 3B CHRONIC KIDNEY DISEASE, WITHOUT LONG-TERM CURRENT USE OF INSULIN (HCC): Primary | ICD-10-CM

## 2024-05-13 DIAGNOSIS — E55.9 VITAMIN D DEFICIENCY: ICD-10-CM

## 2024-05-13 DIAGNOSIS — R73.09 ELEVATED GLUCOSE: ICD-10-CM

## 2024-05-13 DIAGNOSIS — R53.81 MALAISE AND FATIGUE: ICD-10-CM

## 2024-05-13 DIAGNOSIS — C61 MALIGNANT NEOPLASM OF PROSTATE (HCC): ICD-10-CM

## 2024-05-13 DIAGNOSIS — R53.83 MALAISE AND FATIGUE: ICD-10-CM

## 2024-05-13 DIAGNOSIS — E78.2 MIXED HYPERLIPIDEMIA: ICD-10-CM

## 2024-05-13 DIAGNOSIS — N18.32 TYPE 2 DIABETES MELLITUS WITH STAGE 3B CHRONIC KIDNEY DISEASE, WITHOUT LONG-TERM CURRENT USE OF INSULIN (HCC): ICD-10-CM

## 2024-05-13 DIAGNOSIS — Z86.79 HISTORY OF AORTIC STENOSIS: ICD-10-CM

## 2024-05-13 DIAGNOSIS — E27.8 ADRENAL NODULE (HCC): ICD-10-CM

## 2024-05-13 DIAGNOSIS — Z12.11 ENCOUNTER FOR SCREENING COLONOSCOPY: ICD-10-CM

## 2024-05-13 DIAGNOSIS — E11.22 TYPE 2 DIABETES MELLITUS WITH STAGE 3B CHRONIC KIDNEY DISEASE, WITHOUT LONG-TERM CURRENT USE OF INSULIN (HCC): ICD-10-CM

## 2024-05-13 PROCEDURE — 1123F ACP DISCUSS/DSCN MKR DOCD: CPT | Performed by: INTERNAL MEDICINE

## 2024-05-13 PROCEDURE — 3074F SYST BP LT 130 MM HG: CPT | Performed by: INTERNAL MEDICINE

## 2024-05-13 PROCEDURE — 3046F HEMOGLOBIN A1C LEVEL >9.0%: CPT | Performed by: INTERNAL MEDICINE

## 2024-05-13 PROCEDURE — 3078F DIAST BP <80 MM HG: CPT | Performed by: INTERNAL MEDICINE

## 2024-05-13 PROCEDURE — 99214 OFFICE O/P EST MOD 30 MIN: CPT | Performed by: INTERNAL MEDICINE

## 2024-05-13 SDOH — ECONOMIC STABILITY: HOUSING INSECURITY
IN THE LAST 12 MONTHS, WAS THERE A TIME WHEN YOU DID NOT HAVE A STEADY PLACE TO SLEEP OR SLEPT IN A SHELTER (INCLUDING NOW)?: NO

## 2024-05-13 SDOH — ECONOMIC STABILITY: INCOME INSECURITY: HOW HARD IS IT FOR YOU TO PAY FOR THE VERY BASICS LIKE FOOD, HOUSING, MEDICAL CARE, AND HEATING?: NOT HARD AT ALL

## 2024-05-13 SDOH — ECONOMIC STABILITY: FOOD INSECURITY: WITHIN THE PAST 12 MONTHS, THE FOOD YOU BOUGHT JUST DIDN'T LAST AND YOU DIDN'T HAVE MONEY TO GET MORE.: NEVER TRUE

## 2024-05-13 SDOH — ECONOMIC STABILITY: FOOD INSECURITY: WITHIN THE PAST 12 MONTHS, YOU WORRIED THAT YOUR FOOD WOULD RUN OUT BEFORE YOU GOT MONEY TO BUY MORE.: NEVER TRUE

## 2024-05-13 ASSESSMENT — PATIENT HEALTH QUESTIONNAIRE - PHQ9
2. FEELING DOWN, DEPRESSED OR HOPELESS: NOT AT ALL
SUM OF ALL RESPONSES TO PHQ QUESTIONS 1-9: 0
SUM OF ALL RESPONSES TO PHQ9 QUESTIONS 1 & 2: 0
1. LITTLE INTEREST OR PLEASURE IN DOING THINGS: NOT AT ALL

## 2024-05-13 NOTE — ASSESSMENT & PLAN NOTE
Well-controlled, continue current plan pending work up below  Lab Results   Component Value Date    LABA1C 6.4 (H) 01/31/2023    LABA1C 6.1 (H) 10/12/2021    LABA1C 6.4 (H) 06/17/2020     Lab Results   Component Value Date    MALBCR 32 (H) 01/31/2023    CREATININE 0.98 01/31/2023

## 2024-05-14 LAB
25(OH)D3 SERPL-MCNC: 59.9 NG/ML (ref 30–100)
ALBUMIN SERPL-MCNC: 4 G/DL (ref 3.5–5)
ALBUMIN/GLOB SERPL: 1.2 (ref 1.1–2.2)
ALP SERPL-CCNC: 62 U/L (ref 45–117)
ALT SERPL-CCNC: 20 U/L (ref 12–78)
ANION GAP SERPL CALC-SCNC: 4 MMOL/L (ref 5–15)
AST SERPL-CCNC: 10 U/L (ref 15–37)
BASOPHILS # BLD: 0 K/UL (ref 0–0.1)
BASOPHILS NFR BLD: 0 % (ref 0–1)
BILIRUB SERPL-MCNC: 0.4 MG/DL (ref 0.2–1)
BUN SERPL-MCNC: 11 MG/DL (ref 6–20)
BUN/CREAT SERPL: 12 (ref 12–20)
CALCIUM SERPL-MCNC: 9.8 MG/DL (ref 8.5–10.1)
CHLORIDE SERPL-SCNC: 103 MMOL/L (ref 97–108)
CHOLEST SERPL-MCNC: 116 MG/DL
CO2 SERPL-SCNC: 29 MMOL/L (ref 21–32)
CREAT SERPL-MCNC: 0.94 MG/DL (ref 0.7–1.3)
CREAT UR-MCNC: 67.3 MG/DL
DIFFERENTIAL METHOD BLD: NORMAL
EOSINOPHIL # BLD: 0 K/UL (ref 0–0.4)
EOSINOPHIL NFR BLD: 1 % (ref 0–7)
ERYTHROCYTE [DISTWIDTH] IN BLOOD BY AUTOMATED COUNT: 12.6 % (ref 11.5–14.5)
EST. AVERAGE GLUCOSE BLD GHB EST-MCNC: 301 MG/DL
GLOBULIN SER CALC-MCNC: 3.4 G/DL (ref 2–4)
GLUCOSE SERPL-MCNC: 260 MG/DL (ref 65–100)
HBA1C MFR BLD: 12.1 % (ref 4–5.6)
HCT VFR BLD AUTO: 42.5 % (ref 36.6–50.3)
HDLC SERPL-MCNC: 59 MG/DL
HDLC SERPL: 2 (ref 0–5)
HGB BLD-MCNC: 13.5 G/DL (ref 12.1–17)
IMM GRANULOCYTES # BLD AUTO: 0 K/UL (ref 0–0.04)
IMM GRANULOCYTES NFR BLD AUTO: 0 % (ref 0–0.5)
LDLC SERPL CALC-MCNC: 45.8 MG/DL (ref 0–100)
LYMPHOCYTES # BLD: 3.4 K/UL (ref 0.8–3.5)
LYMPHOCYTES NFR BLD: 38 % (ref 12–49)
MCH RBC QN AUTO: 30.8 PG (ref 26–34)
MCHC RBC AUTO-ENTMCNC: 31.8 G/DL (ref 30–36.5)
MCV RBC AUTO: 97 FL (ref 80–99)
MICROALBUMIN UR-MCNC: 2.9 MG/DL
MICROALBUMIN/CREAT UR-RTO: 43 MG/G (ref 0–30)
MONOCYTES # BLD: 0.6 K/UL (ref 0–1)
MONOCYTES NFR BLD: 6 % (ref 5–13)
NEUTS SEG # BLD: 4.8 K/UL (ref 1.8–8)
NEUTS SEG NFR BLD: 55 % (ref 32–75)
NRBC # BLD: 0 K/UL (ref 0–0.01)
NRBC BLD-RTO: 0 PER 100 WBC
PLATELET # BLD AUTO: 313 K/UL (ref 150–400)
PMV BLD AUTO: 10.8 FL (ref 8.9–12.9)
POTASSIUM SERPL-SCNC: 4.8 MMOL/L (ref 3.5–5.1)
PROT SERPL-MCNC: 7.4 G/DL (ref 6.4–8.2)
PSA SERPL-MCNC: 0 NG/ML (ref 0.01–4)
RBC # BLD AUTO: 4.38 M/UL (ref 4.1–5.7)
SODIUM SERPL-SCNC: 136 MMOL/L (ref 136–145)
SPECIMEN HOLD: NORMAL
T4 FREE SERPL-MCNC: 1.1 NG/DL (ref 0.8–1.5)
TRIGL SERPL-MCNC: 56 MG/DL
TSH SERPL DL<=0.05 MIU/L-ACNC: 1.1 UIU/ML (ref 0.36–3.74)
VLDLC SERPL CALC-MCNC: 11.2 MG/DL
WBC # BLD AUTO: 8.8 K/UL (ref 4.1–11.1)

## 2024-05-14 ASSESSMENT — ENCOUNTER SYMPTOMS
RESPIRATORY NEGATIVE: 1
EYES NEGATIVE: 1
ALLERGIC/IMMUNOLOGIC NEGATIVE: 1
GASTROINTESTINAL NEGATIVE: 1

## 2024-05-14 NOTE — PROGRESS NOTES
FIT/FOBT on file   No flexible sigmoidoscopy on file             
Exam  Vitals and nursing note reviewed.   Constitutional:       Appearance: Normal appearance.   Cardiovascular:      Rate and Rhythm: Normal rate and regular rhythm.      Pulses: Normal pulses.      Heart sounds: Normal heart sounds.   Pulmonary:      Effort: Pulmonary effort is normal.      Breath sounds: Normal breath sounds.   Musculoskeletal:         General: Normal range of motion.   Skin:     General: Skin is warm.      Capillary Refill: Capillary refill takes less than 2 seconds.   Neurological:      General: No focal deficit present.      Mental Status: He is alert and oriented to person, place, and time. Mental status is at baseline.   Psychiatric:         Mood and Affect: Mood normal.         Behavior: Behavior normal.         Thought Content: Thought content normal.         Judgment: Judgment normal.        Lisset Kovacs MD  [facility]   5/13/2024

## 2024-05-17 ENCOUNTER — TELEPHONE (OUTPATIENT)
Age: 74
End: 2024-05-17

## 2024-05-17 LAB
METANEPH FREE SERPL-MCNC: <25 PG/ML (ref 0–88)
NORMETANEPHRINE SERPL-MCNC: 43.8 PG/ML (ref 0–285.2)

## 2024-05-17 NOTE — TELEPHONE ENCOUNTER
----- Message from Lisset Kovacs MD sent at 5/17/2024  7:58 AM EDT -----  This suggests that his adrenal gland is not active.   I have asked him to follow up with me to go over his diabetes.   Sent in previous result note.   He is very uncontrolled at this time and needs to follow up with me as soon as possible.   Thanks!    01-Dec-2021 21:11

## 2024-05-21 DIAGNOSIS — E11.9 TYPE 2 DIABETES MELLITUS WITHOUT COMPLICATIONS (HCC): ICD-10-CM

## 2024-05-22 RX ORDER — GLIMEPIRIDE 4 MG/1
4 TABLET ORAL DAILY
Qty: 90 TABLET | Refills: 0 | Status: SHIPPED | OUTPATIENT
Start: 2024-05-22

## 2024-05-24 ENCOUNTER — TELEPHONE (OUTPATIENT)
Age: 74
End: 2024-05-24

## 2024-05-29 DIAGNOSIS — E11.9 TYPE 2 DIABETES MELLITUS WITHOUT COMPLICATIONS (HCC): ICD-10-CM

## 2024-05-29 RX ORDER — SITAGLIPTIN AND METFORMIN HYDROCHLORIDE 500; 50 MG/1; MG/1
1 TABLET, FILM COATED ORAL 2 TIMES DAILY WITH MEALS
Qty: 60 TABLET | Refills: 0 | Status: SHIPPED | OUTPATIENT
Start: 2024-05-29

## 2024-07-11 DIAGNOSIS — E11.9 TYPE 2 DIABETES MELLITUS WITHOUT COMPLICATIONS (HCC): ICD-10-CM

## 2024-07-11 RX ORDER — SITAGLIPTIN AND METFORMIN HYDROCHLORIDE 500; 50 MG/1; MG/1
1 TABLET, FILM COATED ORAL 2 TIMES DAILY WITH MEALS
Qty: 60 TABLET | Refills: 0 | Status: SHIPPED | OUTPATIENT
Start: 2024-07-11

## 2024-07-28 DIAGNOSIS — I10 ESSENTIAL (PRIMARY) HYPERTENSION: ICD-10-CM

## 2024-07-28 DIAGNOSIS — E78.2 MIXED HYPERLIPIDEMIA: ICD-10-CM

## 2024-07-29 RX ORDER — ATORVASTATIN CALCIUM 40 MG/1
TABLET, FILM COATED ORAL
Qty: 90 TABLET | Refills: 1 | Status: SHIPPED | OUTPATIENT
Start: 2024-07-29

## 2024-07-29 RX ORDER — LISINOPRIL 20 MG/1
TABLET ORAL
Qty: 90 TABLET | Refills: 1 | Status: SHIPPED | OUTPATIENT
Start: 2024-07-29

## 2024-08-03 DIAGNOSIS — E11.9 TYPE 2 DIABETES MELLITUS WITHOUT COMPLICATIONS (HCC): ICD-10-CM

## 2024-08-05 RX ORDER — SITAGLIPTIN AND METFORMIN HYDROCHLORIDE 500; 50 MG/1; MG/1
1 TABLET, FILM COATED ORAL 2 TIMES DAILY WITH MEALS
Qty: 60 TABLET | Refills: 1 | Status: SHIPPED | OUTPATIENT
Start: 2024-08-05

## 2024-08-21 DIAGNOSIS — E11.9 TYPE 2 DIABETES MELLITUS WITHOUT COMPLICATIONS (HCC): ICD-10-CM

## 2024-08-21 RX ORDER — GLIMEPIRIDE 4 MG/1
4 TABLET ORAL DAILY
Qty: 90 TABLET | Refills: 0 | Status: SHIPPED | OUTPATIENT
Start: 2024-08-21

## 2024-10-11 DIAGNOSIS — E11.9 TYPE 2 DIABETES MELLITUS WITHOUT COMPLICATIONS (HCC): ICD-10-CM

## 2024-10-11 RX ORDER — SITAGLIPTIN AND METFORMIN HYDROCHLORIDE 500; 50 MG/1; MG/1
1 TABLET, FILM COATED ORAL 2 TIMES DAILY WITH MEALS
Qty: 60 TABLET | Refills: 1 | Status: SHIPPED | OUTPATIENT
Start: 2024-10-11

## 2024-11-12 DIAGNOSIS — E78.2 MIXED HYPERLIPIDEMIA: ICD-10-CM

## 2024-11-12 DIAGNOSIS — E11.9 TYPE 2 DIABETES MELLITUS WITHOUT COMPLICATIONS (HCC): ICD-10-CM

## 2024-11-12 RX ORDER — ATORVASTATIN CALCIUM 40 MG/1
TABLET, FILM COATED ORAL
Qty: 90 TABLET | Refills: 1 | OUTPATIENT
Start: 2024-11-12

## 2024-11-12 RX ORDER — GLIMEPIRIDE 4 MG/1
4 TABLET ORAL DAILY
Qty: 90 TABLET | Refills: 0 | OUTPATIENT
Start: 2024-11-12

## 2024-11-27 ENCOUNTER — LAB (OUTPATIENT)
Age: 74
End: 2024-11-27
Payer: MEDICARE

## 2024-11-27 ENCOUNTER — TELEPHONE (OUTPATIENT)
Age: 74
End: 2024-11-27

## 2024-11-27 ENCOUNTER — OFFICE VISIT (OUTPATIENT)
Age: 74
End: 2024-11-27
Payer: MEDICARE

## 2024-11-27 VITALS
RESPIRATION RATE: 16 BRPM | HEIGHT: 73 IN | SYSTOLIC BLOOD PRESSURE: 158 MMHG | WEIGHT: 217 LBS | BODY MASS INDEX: 28.76 KG/M2 | HEART RATE: 94 BPM | TEMPERATURE: 97.7 F | DIASTOLIC BLOOD PRESSURE: 92 MMHG | OXYGEN SATURATION: 96 %

## 2024-11-27 DIAGNOSIS — R53.81 MALAISE AND FATIGUE: ICD-10-CM

## 2024-11-27 DIAGNOSIS — E78.2 MIXED HYPERLIPIDEMIA: ICD-10-CM

## 2024-11-27 DIAGNOSIS — Z12.5 SCREENING FOR PROSTATE CANCER: ICD-10-CM

## 2024-11-27 DIAGNOSIS — R53.83 MALAISE AND FATIGUE: ICD-10-CM

## 2024-11-27 DIAGNOSIS — I10 UNCONTROLLED HYPERTENSION: ICD-10-CM

## 2024-11-27 DIAGNOSIS — E55.9 VITAMIN D DEFICIENCY: ICD-10-CM

## 2024-11-27 DIAGNOSIS — E11.9 TYPE 2 DIABETES MELLITUS WITHOUT COMPLICATIONS (HCC): ICD-10-CM

## 2024-11-27 DIAGNOSIS — Z23 ENCOUNTER FOR IMMUNIZATION: ICD-10-CM

## 2024-11-27 DIAGNOSIS — E11.9 TYPE 2 DIABETES MELLITUS WITHOUT COMPLICATION, WITHOUT LONG-TERM CURRENT USE OF INSULIN (HCC): ICD-10-CM

## 2024-11-27 DIAGNOSIS — Z00.00 MEDICARE ANNUAL WELLNESS VISIT, SUBSEQUENT: Primary | ICD-10-CM

## 2024-11-27 PROCEDURE — 99214 OFFICE O/P EST MOD 30 MIN: CPT | Performed by: INTERNAL MEDICINE

## 2024-11-27 PROCEDURE — 90653 IIV ADJUVANT VACCINE IM: CPT | Performed by: INTERNAL MEDICINE

## 2024-11-27 RX ORDER — GLIMEPIRIDE 4 MG/1
4 TABLET ORAL DAILY
Qty: 90 TABLET | Refills: 0 | Status: SHIPPED | OUTPATIENT
Start: 2024-11-27

## 2024-11-27 RX ORDER — SITAGLIPTIN AND METFORMIN HYDROCHLORIDE 500; 50 MG/1; MG/1
1 TABLET, FILM COATED ORAL 2 TIMES DAILY WITH MEALS
Qty: 60 TABLET | Refills: 1 | Status: SHIPPED | OUTPATIENT
Start: 2024-11-27

## 2024-11-27 RX ORDER — LISINOPRIL 40 MG/1
40 TABLET ORAL DAILY
Qty: 30 TABLET | Refills: 5 | Status: SHIPPED | OUTPATIENT
Start: 2024-11-27

## 2024-11-27 ASSESSMENT — PATIENT HEALTH QUESTIONNAIRE - PHQ9
10. IF YOU CHECKED OFF ANY PROBLEMS, HOW DIFFICULT HAVE THESE PROBLEMS MADE IT FOR YOU TO DO YOUR WORK, TAKE CARE OF THINGS AT HOME, OR GET ALONG WITH OTHER PEOPLE: NOT DIFFICULT AT ALL
2. FEELING DOWN, DEPRESSED OR HOPELESS: NOT AT ALL
SUM OF ALL RESPONSES TO PHQ QUESTIONS 1-9: 0
1. LITTLE INTEREST OR PLEASURE IN DOING THINGS: NOT AT ALL
8. MOVING OR SPEAKING SO SLOWLY THAT OTHER PEOPLE COULD HAVE NOTICED. OR THE OPPOSITE, BEING SO FIGETY OR RESTLESS THAT YOU HAVE BEEN MOVING AROUND A LOT MORE THAN USUAL: NOT AT ALL
4. FEELING TIRED OR HAVING LITTLE ENERGY: NOT AT ALL
SUM OF ALL RESPONSES TO PHQ QUESTIONS 1-9: 0
5. POOR APPETITE OR OVEREATING: NOT AT ALL
SUM OF ALL RESPONSES TO PHQ QUESTIONS 1-9: 0
9. THOUGHTS THAT YOU WOULD BE BETTER OFF DEAD, OR OF HURTING YOURSELF: NOT AT ALL
3. TROUBLE FALLING OR STAYING ASLEEP: NOT AT ALL
6. FEELING BAD ABOUT YOURSELF - OR THAT YOU ARE A FAILURE OR HAVE LET YOURSELF OR YOUR FAMILY DOWN: NOT AT ALL
SUM OF ALL RESPONSES TO PHQ9 QUESTIONS 1 & 2: 0
7. TROUBLE CONCENTRATING ON THINGS, SUCH AS READING THE NEWSPAPER OR WATCHING TELEVISION: NOT AT ALL
SUM OF ALL RESPONSES TO PHQ QUESTIONS 1-9: 0

## 2024-11-27 ASSESSMENT — LIFESTYLE VARIABLES
HOW OFTEN DO YOU HAVE A DRINK CONTAINING ALCOHOL: NEVER
HOW MANY STANDARD DRINKS CONTAINING ALCOHOL DO YOU HAVE ON A TYPICAL DAY: PATIENT DOES NOT DRINK

## 2024-11-27 NOTE — TELEPHONE ENCOUNTER
Pts wife called bc she said pt forgot to mention some things at his appt.  Pts wife said pt had a DOD physical with Dr. Dean and his blood sugar was in the 380's so he didn't sign off and prescribed him diabetic medication.     I will request request notes from Dr. Dean's office.

## 2024-11-27 NOTE — PROGRESS NOTES
Medicare Annual Wellness Visit    Ousmane Nguyen is here for Medicare AWV (Patient is here for AWV. Not fasting, would like flue shot) and Medication Refill (Patient would like med refill.)    Assessment & Plan   Medicare annual wellness visit, subsequent  Anticipatory guidance discussed.   Immunizations reviewed  HM updated.   Type 2 diabetes mellitus without complications (HCC)  -     sitaGLIPtan-metFORMIN (JANUMET)  MG per tablet; Take 1 tablet by mouth 2 times daily (with meals), Disp-60 tablet, R-1Normal  -     glimepiride (AMARYL) 4 MG tablet; Take 1 tablet by mouth daily, Disp-90 tablet, R-0Normal  -     Hemoglobin A1C; Future  -     Microalbumin / Creatinine Urine Ratio; Future  Mixed hyperlipidemia  -     CBC with Auto Differential; Future  -     Comprehensive Metabolic Panel; Future  Screening for prostate cancer  -     PSA Screening; Future  Vitamin D deficiency  -     Vitamin D 25 Hydroxy; Future  Malaise and fatigue  -     T4, Free; Future  -     TSH; Future  Encounter for immunization  -     Influenza, FLUAD Trivalent, (age 65 y+), IM, Preservative Free, 0.5mL  Uncontrolled hypertension  -     lisinopril (PRINIVIL;ZESTRIL) 40 MG tablet; Take 1 tablet by mouth daily, Disp-30 tablet, R-5Normal   His BP is not controlled. Will increase the Lisinopril to 40 mgs.      Mr. Nguyen has lost some control of his BP and I am concerned about his HBA1C. He may need to have an injection added to his regimen - including GLP-1 or insulin.   He has not been consistent with follow up and re-emphasized that he needs to be seen every 3 months for diabetes management.     Recommendations for Preventive Services Due: see orders and patient instructions/AVS.  Recommended screening schedule for the next 5-10 years is provided to the patient in written form: see Patient Instructions/AVS.     Return in 1 year (on 11/27/2025) for Medicare AWV.     Subjective   74M who presents for AWV and also to follow up T2DM. He has not

## 2024-11-27 NOTE — PROGRESS NOTES
Identified pt with two pt identifiers(name and )    Chief Complaint   Patient presents with    Medicare AWV     Patient is here for AWV. Not fasting, would like flue shot    Medication Refill     Patient would like med refill.        Health Maintenance Due   Topic    DTaP/Tdap/Td vaccine (1 - Tdap)    Diabetic retinal exam     Colorectal Cancer Screen     Annual Wellness Visit (Medicare Advantage)     Flu vaccine (1)    COVID-19 Vaccine ( season)    A1C test (Diabetic or Prediabetic)        Wt Readings from Last 3 Encounters:   24 98.4 kg (217 lb)   24 98.5 kg (217 lb 3.2 oz)   23 104.9 kg (231 lb 3.2 oz)     Temp Readings from Last 3 Encounters:   24 97.7 °F (36.5 °C) (Temporal)   24 98.3 °F (36.8 °C) (Temporal)     BP Readings from Last 3 Encounters:   24 (!) 158/92   24 122/76   23 138/88     Pulse Readings from Last 3 Encounters:   24 94   24 69   23 86           Depression Screening:  :         2024     2:04 PM 2024    10:52 AM 2023     2:00 PM   PHQ-9 Questionaire   Little interest or pleasure in doing things 0 0 0   Feeling down, depressed, or hopeless 0 0 0   Trouble falling or staying asleep, or sleeping too much 0     Feeling tired or having little energy 0     Poor appetite or overeating 0     Feeling bad about yourself - or that you are a failure or have let yourself or your family down 0     Trouble concentrating on things, such as reading the newspaper or watching television 0     Moving or speaking so slowly that other people could have noticed. Or the opposite - being so fidgety or restless that you have been moving around a lot more than usual 0     Thoughts that you would be better off dead, or of hurting yourself in some way 0     PHQ-9 Total Score 0 0 0   If you checked off any problems, how difficult have these problems made it for you to do your work, take care of things at home, or get along with

## 2024-11-28 LAB
25(OH)D3 SERPL-MCNC: 39.4 NG/ML (ref 30–100)
ALBUMIN SERPL-MCNC: 4.8 G/DL (ref 3.5–5)
ALBUMIN/GLOB SERPL: 1.3 (ref 1.1–2.2)
ALP SERPL-CCNC: 76 U/L (ref 45–117)
ALT SERPL-CCNC: 28 U/L (ref 12–78)
ANION GAP SERPL CALC-SCNC: 9 MMOL/L (ref 2–12)
AST SERPL-CCNC: 16 U/L (ref 15–37)
BASOPHILS # BLD: 0 K/UL (ref 0–0.1)
BASOPHILS NFR BLD: 0 % (ref 0–1)
BILIRUB SERPL-MCNC: 0.6 MG/DL (ref 0.2–1)
BUN SERPL-MCNC: 20 MG/DL (ref 6–20)
BUN/CREAT SERPL: 18 (ref 12–20)
CALCIUM SERPL-MCNC: 10.8 MG/DL (ref 8.5–10.1)
CHLORIDE SERPL-SCNC: 104 MMOL/L (ref 97–108)
CO2 SERPL-SCNC: 26 MMOL/L (ref 21–32)
COMMENT:: NORMAL
CREAT SERPL-MCNC: 1.12 MG/DL (ref 0.7–1.3)
CREAT UR-MCNC: 81.9 MG/DL
DIFFERENTIAL METHOD BLD: ABNORMAL
EOSINOPHIL # BLD: 0.1 K/UL (ref 0–0.4)
EOSINOPHIL NFR BLD: 1 % (ref 0–7)
ERYTHROCYTE [DISTWIDTH] IN BLOOD BY AUTOMATED COUNT: 12.8 % (ref 11.5–14.5)
EST. AVERAGE GLUCOSE BLD GHB EST-MCNC: 280 MG/DL
GLOBULIN SER CALC-MCNC: 3.7 G/DL (ref 2–4)
GLUCOSE SERPL-MCNC: 151 MG/DL (ref 65–100)
HBA1C MFR BLD: 11.4 % (ref 4–5.6)
HCT VFR BLD AUTO: 49.4 % (ref 36.6–50.3)
HGB BLD-MCNC: 16.2 G/DL (ref 12.1–17)
IMM GRANULOCYTES # BLD AUTO: 0 K/UL (ref 0–0.04)
IMM GRANULOCYTES NFR BLD AUTO: 0 % (ref 0–0.5)
LYMPHOCYTES # BLD: 3.6 K/UL (ref 0.8–3.5)
LYMPHOCYTES NFR BLD: 33 % (ref 12–49)
MCH RBC QN AUTO: 31.7 PG (ref 26–34)
MCHC RBC AUTO-ENTMCNC: 32.8 G/DL (ref 30–36.5)
MCV RBC AUTO: 96.7 FL (ref 80–99)
MICROALBUMIN UR-MCNC: 7.89 MG/DL
MICROALBUMIN/CREAT UR-RTO: 96 MG/G (ref 0–30)
MONOCYTES # BLD: 0.6 K/UL (ref 0–1)
MONOCYTES NFR BLD: 6 % (ref 5–13)
NEUTS SEG # BLD: 6.7 K/UL (ref 1.8–8)
NEUTS SEG NFR BLD: 60 % (ref 32–75)
NRBC # BLD: 0 K/UL (ref 0–0.01)
NRBC BLD-RTO: 0 PER 100 WBC
PLATELET # BLD AUTO: 265 K/UL (ref 150–400)
PMV BLD AUTO: 11.6 FL (ref 8.9–12.9)
POTASSIUM SERPL-SCNC: 4.9 MMOL/L (ref 3.5–5.1)
PROT SERPL-MCNC: 8.5 G/DL (ref 6.4–8.2)
PSA SERPL-MCNC: 0 NG/ML (ref 0.01–4)
RBC # BLD AUTO: 5.11 M/UL (ref 4.1–5.7)
SODIUM SERPL-SCNC: 139 MMOL/L (ref 136–145)
SPECIMEN HOLD: NORMAL
SPECIMEN HOLD: NORMAL
T4 FREE SERPL-MCNC: 1.2 NG/DL (ref 0.8–1.5)
TSH SERPL DL<=0.05 MIU/L-ACNC: 1.46 UIU/ML (ref 0.36–3.74)
WBC # BLD AUTO: 11 K/UL (ref 4.1–11.1)

## 2024-11-29 ENCOUNTER — TELEPHONE (OUTPATIENT)
Age: 74
End: 2024-11-29

## 2024-11-29 NOTE — TELEPHONE ENCOUNTER
Spoke with wife, scheduled appointment and she expressed concerns. They are in the telephone encounter dated 11.29.24.

## 2024-11-29 NOTE — TELEPHONE ENCOUNTER
I called to get the patient scheduled for the appointment. His wife wanted to make you aware, that Dr. Dean gave him Trijardy XR 12.5mg/2.5mg/1000mg on Tuesday because of his sugar level. He has been taking 1 tab daily, he got a 14 day sample. He is going back on Monday or Tuesday to Dr. Dean to have his sugars rechecked. She is concerned because he is not checking his sugars, the \"new\" box of strips he has at home  in . His wife would like to know if he needs to be NPO for his appointment on Thursday. Please advise.

## 2024-12-05 ENCOUNTER — OFFICE VISIT (OUTPATIENT)
Age: 74
End: 2024-12-05
Payer: MEDICARE

## 2024-12-05 VITALS
WEIGHT: 219 LBS | HEART RATE: 84 BPM | RESPIRATION RATE: 16 BRPM | DIASTOLIC BLOOD PRESSURE: 80 MMHG | BODY MASS INDEX: 29.03 KG/M2 | TEMPERATURE: 97.4 F | OXYGEN SATURATION: 99 % | SYSTOLIC BLOOD PRESSURE: 128 MMHG | HEIGHT: 73 IN

## 2024-12-05 DIAGNOSIS — E11.65 UNCONTROLLED TYPE 2 DIABETES MELLITUS WITH HYPERGLYCEMIA (HCC): Primary | ICD-10-CM

## 2024-12-05 PROCEDURE — 99213 OFFICE O/P EST LOW 20 MIN: CPT | Performed by: INTERNAL MEDICINE

## 2024-12-05 PROCEDURE — 1159F MED LIST DOCD IN RCRD: CPT | Performed by: INTERNAL MEDICINE

## 2024-12-05 PROCEDURE — 3046F HEMOGLOBIN A1C LEVEL >9.0%: CPT | Performed by: INTERNAL MEDICINE

## 2024-12-05 PROCEDURE — 1160F RVW MEDS BY RX/DR IN RCRD: CPT | Performed by: INTERNAL MEDICINE

## 2024-12-05 PROCEDURE — 3079F DIAST BP 80-89 MM HG: CPT | Performed by: INTERNAL MEDICINE

## 2024-12-05 PROCEDURE — 1123F ACP DISCUSS/DSCN MKR DOCD: CPT | Performed by: INTERNAL MEDICINE

## 2024-12-05 PROCEDURE — 3074F SYST BP LT 130 MM HG: CPT | Performed by: INTERNAL MEDICINE

## 2024-12-05 RX ORDER — EMPAGLIFLOZIN, LINAGLIPTIN, METFORMIN HYDROCHLORIDE 12.5; 2.5; 1 MG/1; MG/1; MG/1
1 TABLET, EXTENDED RELEASE ORAL DAILY
COMMUNITY
Start: 2024-12-05

## 2024-12-05 RX ORDER — LANCETS 30 GAUGE
1 EACH MISCELLANEOUS DAILY
Qty: 100 EACH | Refills: 5 | Status: SHIPPED | OUTPATIENT
Start: 2024-12-05

## 2024-12-05 ASSESSMENT — PATIENT HEALTH QUESTIONNAIRE - PHQ9
SUM OF ALL RESPONSES TO PHQ9 QUESTIONS 1 & 2: 0
SUM OF ALL RESPONSES TO PHQ QUESTIONS 1-9: 0
1. LITTLE INTEREST OR PLEASURE IN DOING THINGS: NOT AT ALL
2. FEELING DOWN, DEPRESSED OR HOPELESS: NOT AT ALL

## 2024-12-05 NOTE — PROGRESS NOTES
Identified pt with two pt identifiers(name and )    Chief Complaint   Patient presents with    Follow-up     Patient is here for a follow up. Patient needs one touch ultra test strips.        Health Maintenance Due   Topic    DTaP/Tdap/Td vaccine (1 - Tdap)    Diabetic retinal exam     Colorectal Cancer Screen     COVID-19 Vaccine ( season)       Wt Readings from Last 3 Encounters:   24 99.3 kg (219 lb)   24 98.4 kg (217 lb)   24 98.5 kg (217 lb 3.2 oz)     Temp Readings from Last 3 Encounters:   24 97.4 °F (36.3 °C) (Temporal)   24 97.7 °F (36.5 °C) (Temporal)   24 98.3 °F (36.8 °C) (Temporal)     BP Readings from Last 3 Encounters:   24 (!) 158/88   24 (!) 158/92   24 122/76     Pulse Readings from Last 3 Encounters:   24 84   24 94   24 69           Depression Screening:  :         2024    10:08 AM 2024     2:04 PM 2024    10:52 AM 2023     2:00 PM   PHQ-9 Questionaire   Little interest or pleasure in doing things 0 0 0 0   Feeling down, depressed, or hopeless 0 0 0 0   Trouble falling or staying asleep, or sleeping too much  0     Feeling tired or having little energy  0     Poor appetite or overeating  0     Feeling bad about yourself - or that you are a failure or have let yourself or your family down  0     Trouble concentrating on things, such as reading the newspaper or watching television  0     Moving or speaking so slowly that other people could have noticed. Or the opposite - being so fidgety or restless that you have been moving around a lot more than usual  0     Thoughts that you would be better off dead, or of hurting yourself in some way  0     PHQ-9 Total Score 0 0 0 0   If you checked off any problems, how difficult have these problems made it for you to do your work, take care of things at home, or get along with other people?  0          Fall Risk Assessment:  :         2024     2:01

## 2024-12-05 NOTE — PATIENT INSTRUCTIONS
Diabetic Plan  Restart the Janumet right away 2 x DAILY   Can continue with the TriJardy (until done - you have 4 tablets left).   Glimepiride (Amaryl) 4 mg DAILY   Keep checking BS DAILY up to 2 x daily.   I am sending strips (one Touch Ultra).   I am sending lancets as well.

## 2024-12-05 NOTE — PROGRESS NOTES
Chief Complaint   Patient presents with    Follow-up     Patient is here for a follow up. Patient needs one touch ultra test strips.       ASSESSMENT:  Diabetes Mellitus: improved     ASSESSMENT & PLAN:  1. Uncontrolled type 2 diabetes mellitus with hyperglycemia (HCC)  -     blood glucose test strips (ASCENSIA AUTODISC VI;ONE TOUCH ULTRA TEST VI) strip; 2 TIMES DAILY Starting Thu 2024, Until Wed 3/5/2025, For 90 days, Disp-100 each, R-3, NormalAs needed.  -     Lancets MISC; DAILY Starting Thu 2024, Disp-100 each, R-5, Normal    I reviewed his labs and went over how to use his glucometer with him. I note that his strips have been  since  on two bottle and  on 1 bottle.   Reminded that he needs to increase his frequency of checking BS to 2 x per day.   We checked this morning it was 173.   Note that he was given Trijardy as a sample from an outside physician. He has 4-tablets left.   He now has his Janumet and Glimepiride (which he had been off of for some time).     Patient Instructions   Diabetic Plan  Restart the Janumet right away 2 x DAILY   Can continue with the TriJardy (until done - you have 4 tablets left).   Glimepiride (Amaryl) 4 mg DAILY   Keep checking BS DAILY up to 2 x daily.   I am sending strips (one Touch Ultra).   I am sending lancets as well.      I have discussed the diagnosis with the patient and the intended treatment plan as seen in the above orders. The patient has received an after-visit summary and questions were answered concerning future plans. Asked to return should symptoms worsen or not improve with treatment. Any pending labs and studies will be relayed to patient when they become available.     Pt verbalizes understanding of plan of care and denies further questions or concerns at this time.      Follow up:  Return in about 4 weeks (around 2025), or if symptoms worsen or fail to improve.    SUBJECTIVE:  74 y.o. male for follow up of diabetes. Diabetic

## 2025-01-06 DIAGNOSIS — E11.9 TYPE 2 DIABETES MELLITUS WITHOUT COMPLICATION, WITHOUT LONG-TERM CURRENT USE OF INSULIN (HCC): ICD-10-CM

## 2025-01-07 RX ORDER — GLIMEPIRIDE 4 MG/1
4 TABLET ORAL DAILY
Qty: 90 TABLET | Refills: 0 | Status: SHIPPED | OUTPATIENT
Start: 2025-01-07

## 2025-01-08 NOTE — TELEPHONE ENCOUNTER
----- Message from Eamon Arevalo MD sent at 4/27/2017  1:33 PM EDT -----  Labs stable at this time. Continue to take medications as prescribed. Avoid high cholesterol high fat meals. Continue to eat a diet that does not include high carbohydrates. HBA1C went from 6.0 to 6.3, which is still good. But just want to make sure that keep this number <7.0 if possible. Keep up the good work!
LVM to call back
Sent letter
- - -

## 2025-01-23 ENCOUNTER — COMMUNITY OUTREACH (OUTPATIENT)
Age: 75
End: 2025-01-23

## 2025-01-23 NOTE — PROGRESS NOTES
Patient's HM shows they are overdue for Colorectal Screening.   Care Everywhere and  files searched.  No results to attach to order nor HM updated.     External referral placed 5/2024, no additional follow up found.    Care Everywhere audit shows patient had a Hemoglobin A1C done. Health maintenance has been updated.

## 2025-02-07 DIAGNOSIS — E78.2 MIXED HYPERLIPIDEMIA: ICD-10-CM

## 2025-02-08 RX ORDER — ATORVASTATIN CALCIUM 40 MG/1
TABLET, FILM COATED ORAL
Qty: 90 TABLET | Refills: 0 | Status: SHIPPED | OUTPATIENT
Start: 2025-02-08

## 2025-03-12 DIAGNOSIS — E11.9 TYPE 2 DIABETES MELLITUS WITHOUT COMPLICATION, WITHOUT LONG-TERM CURRENT USE OF INSULIN (HCC): ICD-10-CM

## 2025-03-12 RX ORDER — SITAGLIPTIN AND METFORMIN HYDROCHLORIDE 500; 50 MG/1; MG/1
1 TABLET, FILM COATED ORAL 2 TIMES DAILY WITH MEALS
Qty: 60 TABLET | Refills: 1 | Status: SHIPPED | OUTPATIENT
Start: 2025-03-12

## 2025-04-11 DIAGNOSIS — E78.2 MIXED HYPERLIPIDEMIA: ICD-10-CM

## 2025-04-11 DIAGNOSIS — E11.9 TYPE 2 DIABETES MELLITUS WITHOUT COMPLICATION, WITHOUT LONG-TERM CURRENT USE OF INSULIN: ICD-10-CM

## 2025-04-13 RX ORDER — ATORVASTATIN CALCIUM 40 MG/1
TABLET, FILM COATED ORAL
Qty: 90 TABLET | Refills: 0 | Status: SHIPPED | OUTPATIENT
Start: 2025-04-13

## 2025-04-13 RX ORDER — GLIMEPIRIDE 4 MG/1
4 TABLET ORAL DAILY
Qty: 90 TABLET | Refills: 0 | Status: SHIPPED | OUTPATIENT
Start: 2025-04-13

## 2025-05-05 ENCOUNTER — OFFICE VISIT (OUTPATIENT)
Age: 75
End: 2025-05-05
Payer: MEDICARE

## 2025-05-05 VITALS
HEART RATE: 97 BPM | OXYGEN SATURATION: 98 % | HEIGHT: 73 IN | TEMPERATURE: 98.1 F | DIASTOLIC BLOOD PRESSURE: 74 MMHG | RESPIRATION RATE: 16 BRPM | BODY MASS INDEX: 28.34 KG/M2 | SYSTOLIC BLOOD PRESSURE: 138 MMHG | WEIGHT: 213.8 LBS

## 2025-05-05 DIAGNOSIS — E11.65 POORLY CONTROLLED DIABETES MELLITUS (HCC): ICD-10-CM

## 2025-05-05 DIAGNOSIS — E55.9 VITAMIN D DEFICIENCY: ICD-10-CM

## 2025-05-05 DIAGNOSIS — Z91.199 NON-COMPLIANCE: ICD-10-CM

## 2025-05-05 DIAGNOSIS — E11.9 TYPE 2 DIABETES MELLITUS WITHOUT COMPLICATION, WITHOUT LONG-TERM CURRENT USE OF INSULIN (HCC): ICD-10-CM

## 2025-05-05 DIAGNOSIS — R53.83 MALAISE AND FATIGUE: ICD-10-CM

## 2025-05-05 DIAGNOSIS — R53.81 MALAISE AND FATIGUE: ICD-10-CM

## 2025-05-05 DIAGNOSIS — E78.2 MIXED HYPERLIPIDEMIA: ICD-10-CM

## 2025-05-05 DIAGNOSIS — Z00.00 MEDICARE ANNUAL WELLNESS VISIT, SUBSEQUENT: Primary | ICD-10-CM

## 2025-05-05 DIAGNOSIS — Z12.5 SCREENING FOR PROSTATE CANCER: ICD-10-CM

## 2025-05-05 DIAGNOSIS — Z12.11 SCREENING FOR COLON CANCER: ICD-10-CM

## 2025-05-05 LAB — HBA1C MFR BLD: 11.6 %

## 2025-05-05 PROCEDURE — 1123F ACP DISCUSS/DSCN MKR DOCD: CPT | Performed by: INTERNAL MEDICINE

## 2025-05-05 PROCEDURE — PBSHW AMB POC HEMOGLOBIN A1C: Performed by: INTERNAL MEDICINE

## 2025-05-05 PROCEDURE — 1159F MED LIST DOCD IN RCRD: CPT | Performed by: INTERNAL MEDICINE

## 2025-05-05 PROCEDURE — 3078F DIAST BP <80 MM HG: CPT | Performed by: INTERNAL MEDICINE

## 2025-05-05 PROCEDURE — 99214 OFFICE O/P EST MOD 30 MIN: CPT | Performed by: INTERNAL MEDICINE

## 2025-05-05 PROCEDURE — 83036 HEMOGLOBIN GLYCOSYLATED A1C: CPT | Performed by: INTERNAL MEDICINE

## 2025-05-05 PROCEDURE — 3046F HEMOGLOBIN A1C LEVEL >9.0%: CPT | Performed by: INTERNAL MEDICINE

## 2025-05-05 PROCEDURE — 3075F SYST BP GE 130 - 139MM HG: CPT | Performed by: INTERNAL MEDICINE

## 2025-05-05 PROCEDURE — 1160F RVW MEDS BY RX/DR IN RCRD: CPT | Performed by: INTERNAL MEDICINE

## 2025-05-05 PROCEDURE — G0439 PPPS, SUBSEQ VISIT: HCPCS | Performed by: INTERNAL MEDICINE

## 2025-05-05 SDOH — ECONOMIC STABILITY: FOOD INSECURITY: WITHIN THE PAST 12 MONTHS, YOU WORRIED THAT YOUR FOOD WOULD RUN OUT BEFORE YOU GOT MONEY TO BUY MORE.: NEVER TRUE

## 2025-05-05 SDOH — ECONOMIC STABILITY: FOOD INSECURITY: WITHIN THE PAST 12 MONTHS, THE FOOD YOU BOUGHT JUST DIDN'T LAST AND YOU DIDN'T HAVE MONEY TO GET MORE.: NEVER TRUE

## 2025-05-05 ASSESSMENT — PATIENT HEALTH QUESTIONNAIRE - PHQ9
4. FEELING TIRED OR HAVING LITTLE ENERGY: NOT AT ALL
8. MOVING OR SPEAKING SO SLOWLY THAT OTHER PEOPLE COULD HAVE NOTICED. OR THE OPPOSITE, BEING SO FIGETY OR RESTLESS THAT YOU HAVE BEEN MOVING AROUND A LOT MORE THAN USUAL: NOT AT ALL
5. POOR APPETITE OR OVEREATING: NOT AT ALL
SUM OF ALL RESPONSES TO PHQ QUESTIONS 1-9: 0
SUM OF ALL RESPONSES TO PHQ QUESTIONS 1-9: 0
9. THOUGHTS THAT YOU WOULD BE BETTER OFF DEAD, OR OF HURTING YOURSELF: NOT AT ALL
2. FEELING DOWN, DEPRESSED OR HOPELESS: NOT AT ALL
7. TROUBLE CONCENTRATING ON THINGS, SUCH AS READING THE NEWSPAPER OR WATCHING TELEVISION: NOT AT ALL
10. IF YOU CHECKED OFF ANY PROBLEMS, HOW DIFFICULT HAVE THESE PROBLEMS MADE IT FOR YOU TO DO YOUR WORK, TAKE CARE OF THINGS AT HOME, OR GET ALONG WITH OTHER PEOPLE: NOT DIFFICULT AT ALL
6. FEELING BAD ABOUT YOURSELF - OR THAT YOU ARE A FAILURE OR HAVE LET YOURSELF OR YOUR FAMILY DOWN: NOT AT ALL
3. TROUBLE FALLING OR STAYING ASLEEP: NOT AT ALL
SUM OF ALL RESPONSES TO PHQ QUESTIONS 1-9: 0
1. LITTLE INTEREST OR PLEASURE IN DOING THINGS: NOT AT ALL
SUM OF ALL RESPONSES TO PHQ QUESTIONS 1-9: 0

## 2025-05-05 ASSESSMENT — LIFESTYLE VARIABLES
HOW MANY STANDARD DRINKS CONTAINING ALCOHOL DO YOU HAVE ON A TYPICAL DAY: PATIENT DOES NOT DRINK
HOW OFTEN DO YOU HAVE A DRINK CONTAINING ALCOHOL: NEVER

## 2025-05-05 NOTE — PROGRESS NOTES
Identified pt with two pt identifiers(name and )    Chief Complaint   Patient presents with    Follow-up     No concerns, fasting    Medicare AWV    Discuss Medications     glimperide        Health Maintenance Due   Topic    DTaP/Tdap/Td vaccine (1 - Tdap)    Respiratory Syncytial Virus (RSV) Pregnant or age 60 yrs+ (1 - Risk 60-74 years 1-dose series)    Diabetic retinal exam     Colorectal Cancer Screen     COVID-19 Vaccine ( season)    Annual Wellness Visit (Medicare Advantage)     Diabetic foot exam     Lipids     A1C test (Diabetic or Prediabetic)        Wt Readings from Last 3 Encounters:   25 97 kg (213 lb 12.8 oz)   24 99.3 kg (219 lb)   24 98.4 kg (217 lb)     Temp Readings from Last 3 Encounters:   25 98.1 °F (36.7 °C) (Temporal)   24 97.4 °F (36.3 °C) (Temporal)   24 97.7 °F (36.5 °C) (Temporal)     BP Readings from Last 3 Encounters:   25 138/74   24 128/80   24 (!) 158/92     Pulse Readings from Last 3 Encounters:   25 97   24 84   24 94           Depression Screening:  :         2025    11:09 AM 2024    10:08 AM 2024     2:04 PM 2024    10:52 AM 2023     2:00 PM   PHQ-9 Questionaire   Little interest or pleasure in doing things 0 0 0 0 0   Feeling down, depressed, or hopeless 0 0 0 0 0   Trouble falling or staying asleep, or sleeping too much 0  0     Feeling tired or having little energy 0  0     Poor appetite or overeating 0  0     Feeling bad about yourself - or that you are a failure or have let yourself or your family down 0  0     Trouble concentrating on things, such as reading the newspaper or watching television 0  0     Moving or speaking so slowly that other people could have noticed. Or the opposite - being so fidgety or restless that you have been moving around a lot more than usual 0  0     Thoughts that you would be better off dead, or of hurting yourself in some way 0  0

## 2025-05-05 NOTE — PROGRESS NOTES
Medicare Annual Wellness Visit    Ousmane Nguyen is here for Follow-up (No concerns, fasting), Medicare AWV, and Discuss Medications (glimperide)    Assessment & Plan   Medicare annual wellness visit, subsequent  Anticipatory guidance discussed.   Immunizations reviewed   updated.   Type 2 diabetes mellitus without complication, without long-term current use of insulin (HCC)  -     AMB POC HEMOGLOBIN A1C  -      DIABETES FOOT EXAM  Vitamin D deficiency  -     Vitamin D 25 Hydroxy; Future  Malaise and fatigue  -     T4, Free; Future  -     TSH; Future  Mixed hyperlipidemia  -     Comprehensive Metabolic Panel; Future  -     CBC with Auto Differential; Future  -     Lipid Panel; Future  Screening for prostate cancer  -     PSA Screening; Future  Screening for colon cancer  -     [facility] - Pradeep Gautam MD, [facility] ([facility])  Poorly controlled diabetes mellitus (HCC)  -     Semaglutide,0.25 or 0.5MG/DOS, 2 MG/3ML SOPN; Inject 0.5 mg into the skin every 7 days, Disp-3 mL, R-0Normal  Non-compliance   Gently reminded the patient that he needs to be seen every 3-months.    That his BS are out of control at this time with HBA1C = 11.7   He admits to dietary indiscretions as well.     He also is over due for his colonoscopy and once again gave a referral today.     I have discussed the diagnosis with the patient and the intended treatment plan as seen in the above orders. The patient has received an after-visit summary and questions were answered concerning future plans. Asked to return should symptoms worsen or not improve with treatment. Any pending labs and studies will be relayed to patient when they become available.     Pt verbalizes understanding of plan of care and denies further questions or concerns at this time.     Return in 3 months (on 8/5/2025).     Subjective   74M with T2DM who presents for AWV and also follow up of diabetes.   He was last seen in the office on 12/2024.   Generally

## 2025-05-06 ENCOUNTER — LAB (OUTPATIENT)
Age: 75
End: 2025-05-06

## 2025-05-06 DIAGNOSIS — E78.2 MIXED HYPERLIPIDEMIA: ICD-10-CM

## 2025-05-06 DIAGNOSIS — E55.9 VITAMIN D DEFICIENCY: ICD-10-CM

## 2025-05-06 DIAGNOSIS — Z12.5 SCREENING FOR PROSTATE CANCER: ICD-10-CM

## 2025-05-06 DIAGNOSIS — R53.81 MALAISE AND FATIGUE: ICD-10-CM

## 2025-05-06 DIAGNOSIS — R53.83 MALAISE AND FATIGUE: ICD-10-CM

## 2025-05-07 LAB
25(OH)D3 SERPL-MCNC: 43.4 NG/ML (ref 30–100)
ALBUMIN SERPL-MCNC: 4.2 G/DL (ref 3.5–5)
ALBUMIN/GLOB SERPL: 1.2 (ref 1.1–2.2)
ALP SERPL-CCNC: 74 U/L (ref 45–117)
ALT SERPL-CCNC: 29 U/L (ref 12–78)
ANION GAP SERPL CALC-SCNC: 7 MMOL/L (ref 2–12)
AST SERPL-CCNC: 6 U/L (ref 15–37)
BASOPHILS # BLD: 0.02 K/UL (ref 0–0.1)
BASOPHILS NFR BLD: 0.3 % (ref 0–1)
BILIRUB SERPL-MCNC: 0.7 MG/DL (ref 0.2–1)
BUN SERPL-MCNC: 11 MG/DL (ref 6–20)
BUN/CREAT SERPL: 10 (ref 12–20)
CALCIUM SERPL-MCNC: 10 MG/DL (ref 8.5–10.1)
CHLORIDE SERPL-SCNC: 103 MMOL/L (ref 97–108)
CHOLEST SERPL-MCNC: 122 MG/DL
CO2 SERPL-SCNC: 26 MMOL/L (ref 21–32)
CREAT SERPL-MCNC: 1.1 MG/DL (ref 0.7–1.3)
DIFFERENTIAL METHOD BLD: NORMAL
EOSINOPHIL # BLD: 0.04 K/UL (ref 0–0.4)
EOSINOPHIL NFR BLD: 0.5 % (ref 0–7)
ERYTHROCYTE [DISTWIDTH] IN BLOOD BY AUTOMATED COUNT: 13 % (ref 11.5–14.5)
GLOBULIN SER CALC-MCNC: 3.6 G/DL (ref 2–4)
GLUCOSE SERPL-MCNC: 334 MG/DL (ref 65–100)
HCT VFR BLD AUTO: 43.4 % (ref 36.6–50.3)
HDLC SERPL-MCNC: 51 MG/DL
HDLC SERPL: 2.4 (ref 0–5)
HGB BLD-MCNC: 14.4 G/DL (ref 12.1–17)
IMM GRANULOCYTES # BLD AUTO: 0.03 K/UL (ref 0–0.04)
IMM GRANULOCYTES NFR BLD AUTO: 0.4 % (ref 0–0.5)
LDLC SERPL CALC-MCNC: 59.6 MG/DL (ref 0–100)
LYMPHOCYTES # BLD: 2.85 K/UL (ref 0.8–3.5)
LYMPHOCYTES NFR BLD: 36.9 % (ref 12–49)
MCH RBC QN AUTO: 32 PG (ref 26–34)
MCHC RBC AUTO-ENTMCNC: 33.2 G/DL (ref 30–36.5)
MCV RBC AUTO: 96.4 FL (ref 80–99)
MONOCYTES # BLD: 0.62 K/UL (ref 0–1)
MONOCYTES NFR BLD: 8 % (ref 5–13)
NEUTS SEG # BLD: 4.16 K/UL (ref 1.8–8)
NEUTS SEG NFR BLD: 53.9 % (ref 32–75)
NRBC # BLD: 0 K/UL (ref 0–0.01)
NRBC BLD-RTO: 0 PER 100 WBC
PLATELET # BLD AUTO: 270 K/UL (ref 150–400)
PMV BLD AUTO: 11.4 FL (ref 8.9–12.9)
POTASSIUM SERPL-SCNC: 4.8 MMOL/L (ref 3.5–5.1)
PROT SERPL-MCNC: 7.8 G/DL (ref 6.4–8.2)
PSA SERPL-MCNC: 0 NG/ML (ref 0.01–4)
RBC # BLD AUTO: 4.5 M/UL (ref 4.1–5.7)
SODIUM SERPL-SCNC: 136 MMOL/L (ref 136–145)
T4 FREE SERPL-MCNC: 1.1 NG/DL (ref 0.8–1.5)
TRIGL SERPL-MCNC: 57 MG/DL
TSH SERPL DL<=0.05 MIU/L-ACNC: 0.96 UIU/ML (ref 0.36–3.74)
VLDLC SERPL CALC-MCNC: 11.4 MG/DL
WBC # BLD AUTO: 7.7 K/UL (ref 4.1–11.1)

## 2025-05-08 ENCOUNTER — RESULTS FOLLOW-UP (OUTPATIENT)
Age: 75
End: 2025-05-08

## 2025-05-08 NOTE — TELEPHONE ENCOUNTER
----- Message from Dr. Lisset Kovacs MD sent at 5/8/2025 12:54 PM EDT -----  Please let patient know that his other labs are stable.   PSA is still 0.   Hopefully he was able to get the Ozempic. If not he should let me know.   Also, he needs to be seen in 3-months. Please ask him to schedule the appointment now.   Thanks!

## 2025-05-09 NOTE — TELEPHONE ENCOUNTER
Called pt and relayed results, he understood. Transferred pt to the front to get an appointment scheduled

## 2025-05-12 DIAGNOSIS — E11.9 TYPE 2 DIABETES MELLITUS WITHOUT COMPLICATION, WITHOUT LONG-TERM CURRENT USE OF INSULIN (HCC): ICD-10-CM

## 2025-05-12 RX ORDER — SITAGLIPTIN AND METFORMIN HYDROCHLORIDE 500; 50 MG/1; MG/1
1 TABLET, FILM COATED ORAL 2 TIMES DAILY WITH MEALS
Qty: 180 TABLET | Refills: 2 | Status: SHIPPED | OUTPATIENT
Start: 2025-05-12

## 2025-05-22 DIAGNOSIS — I10 UNCONTROLLED HYPERTENSION: ICD-10-CM

## 2025-05-23 RX ORDER — LISINOPRIL 40 MG/1
40 TABLET ORAL DAILY
Qty: 90 TABLET | Refills: 1 | Status: SHIPPED | OUTPATIENT
Start: 2025-05-23

## 2025-05-27 DIAGNOSIS — E11.65 POORLY CONTROLLED DIABETES MELLITUS (HCC): ICD-10-CM

## 2025-05-27 RX ORDER — SEMAGLUTIDE 0.68 MG/ML
0.5 INJECTION, SOLUTION SUBCUTANEOUS
Qty: 2 ML | Refills: 3 | Status: SHIPPED | OUTPATIENT
Start: 2025-05-27

## 2025-07-16 ENCOUNTER — TELEPHONE (OUTPATIENT)
Age: 75
End: 2025-07-16

## 2025-07-16 NOTE — TELEPHONE ENCOUNTER
Provider not in the office on Monday Aug 11, 2025. Appointment needs to be rescheduled. Left voicemail.

## 2025-07-23 DIAGNOSIS — E11.9 TYPE 2 DIABETES MELLITUS WITHOUT COMPLICATION, WITHOUT LONG-TERM CURRENT USE OF INSULIN (HCC): ICD-10-CM

## 2025-07-23 RX ORDER — HYDROCORTISONE 25 MG/G
CREAM TOPICAL
Qty: 28 G | Refills: 3 | Status: SHIPPED | OUTPATIENT
Start: 2025-07-23

## 2025-07-23 RX ORDER — GLIMEPIRIDE 4 MG/1
4 TABLET ORAL DAILY
Qty: 90 TABLET | Refills: 0 | Status: SHIPPED | OUTPATIENT
Start: 2025-07-23

## 2025-07-31 DIAGNOSIS — E78.2 MIXED HYPERLIPIDEMIA: ICD-10-CM

## 2025-08-01 RX ORDER — ATORVASTATIN CALCIUM 40 MG/1
TABLET, FILM COATED ORAL
Qty: 90 TABLET | Refills: 0 | Status: SHIPPED | OUTPATIENT
Start: 2025-08-01

## 2025-08-18 ENCOUNTER — OFFICE VISIT (OUTPATIENT)
Age: 75
End: 2025-08-18
Payer: MEDICARE

## 2025-08-18 VITALS
RESPIRATION RATE: 20 BRPM | WEIGHT: 218.4 LBS | BODY MASS INDEX: 28.03 KG/M2 | HEIGHT: 74 IN | HEART RATE: 88 BPM | DIASTOLIC BLOOD PRESSURE: 68 MMHG | SYSTOLIC BLOOD PRESSURE: 134 MMHG | TEMPERATURE: 97.9 F | OXYGEN SATURATION: 96 %

## 2025-08-18 DIAGNOSIS — Z12.11 SCREENING FOR COLON CANCER: ICD-10-CM

## 2025-08-18 DIAGNOSIS — E11.9 TYPE 2 DIABETES MELLITUS WITHOUT COMPLICATION, WITHOUT LONG-TERM CURRENT USE OF INSULIN (HCC): Primary | ICD-10-CM

## 2025-08-18 LAB — HBA1C MFR BLD: 8 %

## 2025-08-18 PROCEDURE — 99214 OFFICE O/P EST MOD 30 MIN: CPT | Performed by: INTERNAL MEDICINE

## 2025-08-18 PROCEDURE — 83036 HEMOGLOBIN GLYCOSYLATED A1C: CPT | Performed by: INTERNAL MEDICINE

## 2025-08-18 PROCEDURE — 3075F SYST BP GE 130 - 139MM HG: CPT | Performed by: INTERNAL MEDICINE

## 2025-08-18 PROCEDURE — 1126F AMNT PAIN NOTED NONE PRSNT: CPT | Performed by: INTERNAL MEDICINE

## 2025-08-18 PROCEDURE — 3052F HG A1C>EQUAL 8.0%<EQUAL 9.0%: CPT | Performed by: INTERNAL MEDICINE

## 2025-08-18 PROCEDURE — 1123F ACP DISCUSS/DSCN MKR DOCD: CPT | Performed by: INTERNAL MEDICINE

## 2025-08-18 PROCEDURE — PBSHW AMB POC HEMOGLOBIN A1C: Performed by: INTERNAL MEDICINE

## 2025-08-18 PROCEDURE — 3078F DIAST BP <80 MM HG: CPT | Performed by: INTERNAL MEDICINE

## 2025-08-18 PROCEDURE — 1159F MED LIST DOCD IN RCRD: CPT | Performed by: INTERNAL MEDICINE

## 2025-08-18 RX ORDER — SITAGLIPTIN AND METFORMIN HYDROCHLORIDE 500; 50 MG/1; MG/1
1 TABLET, FILM COATED ORAL 2 TIMES DAILY WITH MEALS
Qty: 180 TABLET | Refills: 2 | Status: SHIPPED | OUTPATIENT
Start: 2025-08-18

## 2025-08-18 RX ORDER — AVOBENZONE, HOMOSALATE, OCTISALATE, OCTOCRYLENE 30; 40; 45; 26 MG/ML; MG/ML; MG/ML; MG/ML
1 CREAM TOPICAL DAILY
Qty: 100 EACH | Refills: 5 | Status: SHIPPED | OUTPATIENT
Start: 2025-08-18

## 2025-08-30 LAB — NONINV COLON CA DNA+OCC BLD SCRN STL QL: NEGATIVE

## 2025-09-02 ENCOUNTER — RESULTS FOLLOW-UP (OUTPATIENT)
Age: 75
End: 2025-09-02